# Patient Record
Sex: FEMALE | Race: WHITE | NOT HISPANIC OR LATINO | Employment: FULL TIME | URBAN - METROPOLITAN AREA
[De-identification: names, ages, dates, MRNs, and addresses within clinical notes are randomized per-mention and may not be internally consistent; named-entity substitution may affect disease eponyms.]

---

## 2017-09-28 ENCOUNTER — ALLSCRIPTS OFFICE VISIT (OUTPATIENT)
Dept: OTHER | Facility: OTHER | Age: 52
End: 2017-09-28

## 2017-09-28 DIAGNOSIS — M79.671 PAIN OF RIGHT FOOT: ICD-10-CM

## 2017-09-28 DIAGNOSIS — R91.1 SOLITARY PULMONARY NODULE: ICD-10-CM

## 2017-09-28 DIAGNOSIS — R60.0 LOCALIZED EDEMA: ICD-10-CM

## 2017-09-28 DIAGNOSIS — M54.50 LOW BACK PAIN: ICD-10-CM

## 2017-09-28 DIAGNOSIS — M79.604 PAIN OF RIGHT LEG: ICD-10-CM

## 2017-09-28 DIAGNOSIS — I10 ESSENTIAL (PRIMARY) HYPERTENSION: ICD-10-CM

## 2017-09-28 DIAGNOSIS — Z12.31 ENCOUNTER FOR SCREENING MAMMOGRAM FOR MALIGNANT NEOPLASM OF BREAST: ICD-10-CM

## 2017-09-28 DIAGNOSIS — E78.2 MIXED HYPERLIPIDEMIA: ICD-10-CM

## 2017-09-28 DIAGNOSIS — M79.672 PAIN OF LEFT FOOT: ICD-10-CM

## 2017-09-28 DIAGNOSIS — Z13.6 ENCOUNTER FOR SCREENING FOR CARDIOVASCULAR DISORDERS: ICD-10-CM

## 2017-09-28 DIAGNOSIS — M79.605 PAIN OF LEFT LEG: ICD-10-CM

## 2017-10-13 ENCOUNTER — ALLSCRIPTS OFFICE VISIT (OUTPATIENT)
Dept: OTHER | Facility: OTHER | Age: 52
End: 2017-10-13

## 2017-10-29 NOTE — CONSULTS
Assessment  1  GERD (gastroesophageal reflux disease) (530 81) (K21 9)   2  Encounter for colorectal cancer screening (V76 51) (Z12 11,Z12 12)    Plan  Encounter for colorectal cancer screening    · Suprep Bowel Prep Kit 17 5-3 13-1 6 GM/180ML Oral Solution; USE AS DIRECTED   Rx By: Kimber Maurer; Dispense: 0 Days ; #:1 X 177 ML Bottle (2 Bottles); Refill: 0;For: Encounter for colorectal cancer screening; ESTEFANIA = N; Verified Transmission to [facility] [place] ([address]; Last Updated By: System, RegenaStem; 10/13/2017 4:22:08 PM   · COLONOSCOPY (GI, SURG); Status:Hold For - Scheduling; Requested QKK:[id number];    Perform:[facility]; Due:13Oct2018; Ordered;For:Encounter for colorectal cancer screening; Ordered By:Garcia Spivey;  GERD (gastroesophageal reflux disease)    · EGD; Status:Hold For - Scheduling; Requested for:13Oct2017;    Perform:[facility]; Due:18Oct2017;Ordered;(gastroesophageal reflux disease); Ordered By:Garcia Spivey; Discussion/Summary  Discussion Summary:     Screening for colon cancer - patient is at average risk for colon cancer screening  Rule out colorectal lesions including polyps or malignancy  Gastroesophageal reflux disease, long-standing- rule out Leavitt's esophagus    Schedule for both EGD and colonoscopy  High-fiber diet  Patient was given instructions about the colonoscopy prep  Patient was explained about the risks and benefits of the procedure  Risks including but not limited to bleeding, infection, perforation were explained in detail  Also explained about less than 100% sensitivity with the exam and other alternatives  Continue Pepcid as needed  Patient was explained about the lifestyle and dietary modifications  Advised to avoid fatty foods, chocolates, caffeine, alcohol and any other triggering foods  Avoid eating for at least 3 hours before going to bed     Counseling Documentation With Imm: The patient was counseled regarding instructions for management,-- risk factor reductions,-- patient and family education,-- risks and benefits of treatment options,-- importance of compliance with treatment  Chief Complaint  Chief Complaint Free Text Note Form: for colon cancer      History of Present Illness  HPI: female with history of hypertension was referred for colonoscopy  Patient had colonoscopy about 30 years ago  Patient has regular bowel movements and denies any blood or mucus in the stool  Appetite is good and denies any recent weight loss  Denies any abdominal pain, nausea, or vomiting  She has also chronic acid reflux for which she was taking Pepcid on regular basis until recently  Denies any difficulty swallowing  History Reviewed: The history was obtained today from the patient and I agree with the documented history  Review of Systems  Complete-Female GI Adult:  Constitutional: No fever, no chills, feels well, no tiredness, no recent weight gain or weight loss  Eyes: No complaints of eye pain, no red eyes, no eyesight problems, no discharge, no dry eyes, no itching of eyes  ENT: no complaints of earache, no loss of hearing, no nose bleeds, no nasal discharge, no sore throat, no hoarseness  Cardiovascular: No complaints of slow heart rate, no fast heart rate, no chest pain, no palpitations, no leg claudication, no lower extremity edema  Respiratory: No complaints of shortness of breath, no wheezing, no cough, no SOB on exertion, no orthopnea, no PND  Gastrointestinal: as noted in HPI  Genitourinary: No complaints of dysuria, no incontinence, no pelvic pain, no dysmenorrhea, no vaginal discharge or bleeding  Musculoskeletal: arthralgias, but-- no joint swelling,-- no limb pain,-- no myalgias,-- no joint stiffness-- and-- no limb swelling  Integumentary: No complaints of skin rash or lesions, no itching, no skin wounds, no breast pain or lump    Neurological: No complaints of headache, no confusion, no convulsions, no numbness, no dizziness or fainting, no tingling, no limb weakness, no difficulty walking  Psychiatric: Not suicidal, no sleep disturbance, no anxiety or depression, no change in personality, no emotional problems  Endocrine: No complaints of proptosis, no hot flashes, no muscle weakness, no deepening of the voice, no feelings of weakness  Hematologic/Lymphatic: No complaints of swollen glands, no swollen glands in the neck, does not bleed easily, does not bruise easily  Active Problems  1  Adverse reaction to statin medication (E942 2) (T46 6X5A)   2  Bilateral leg and foot pain (729 5) (M79 604,M79 605,M79 671,M79 672)   3  Encounter for colorectal cancer screening (V76 51) (Z12 11,Z12 12)   4  Encounter for screening for cardiovascular disorders (V81 2) (Z13 6)   5  Hyperlipemia, mixed (272 2) (E78 2)   6  Hypertension (401 9) (I10)   7  Lower extremity edema (782 3) (R60 0)   8  Lumbago (724 2) (M54 5)   9  Nodule of apex of right lung (793 11) (R91 1)   10  Screening mammogram, encounter for (V76 12) (Z12 31)    Past Medical History  1  History of Pinched nerve (355 9) (G58 9)  Active Problems And Past Medical History Reviewed: The active problems and past medical history were reviewed and updated today  Surgical History  1  History of Arterial Catheterization   2  History of Biopsy Skin   3  History of Repair Tendon / Muscle Of Elbow  Surgical History Reviewed: The surgical history was reviewed and updated today  Family History  Mother    1  Family history of Dementia due to Alzheimer's disease   2  Family history of Edema   3  Family history of Osteoarthritis of knee  Father    4  Family history of alcoholism (V17 0) (Z81 1)   5  Family history of cerebrovascular accident (CVA) (V17 1) (Z82 3)   6  Family history of coronary artery disease (V17 3) (Z82 49)   7  Family history of essential hypertension (V17 49) (Z82 49)   8  Family history of hypothyroidism (V18 19) (Z83 49)   9   Family history of Status post coronary artery bypass graft  Sister    10  Family history of cerebrovascular accident (CVA) (V17 1) (Z82 3)  Brother    6  Family history of suicide (V17 0) (Z81 8)  Family History Reviewed: The family history was reviewed and updated today  Social History     · Former smoker (D85 96) (A99 431)  Social History Reviewed: The social history was reviewed and updated today  The social history was reviewed and is unchanged  Current Meds   1  Furosemide 40 MG Oral Tablet; ONE EVERY DAY  Requested for: 75BBF1416; Last Rx:[id number] Ordered   2  Ibuprofen TABS; TAKE TABLET  PRN; Therapy: (Recorded:13Oct2017) to Recorded   3  Losartan Potassium-HCTZ 50-12 5 MG Oral Tablet; TAKE 1 TABLET DAILY; Therapy: 47QIQ7244 to [phone])  Requested for: 22RYG8122; Last Rx:35Mow7159 Ordered   4  Omega-3-acid Ethyl Esters 1 GM Oral Capsule; TAKE 2 CAPSULES TWICE DAILY WITH MEALS  Requested for: 85YJZ8197; Last Rx:[id number] Ordered   5  Pepcid TABS; TAKE TABLET  PRN; Therapy: (Recorded:13Oct2017) to Recorded   6  Soma TABS; TAKE TABLET  PRN; Therapy: (Recorded:13Oct2017) to Recorded  Medication List Reviewed: The medication list was reviewed and updated today  Allergies  1  No Known Drug Allergies    Vitals  Vital Signs    Recorded: 45LQR0046 04:13PM   Temperature 98 5 F   Heart Rate 90   Respiration 16   Systolic 606   Diastolic 74   Height 5 ft 1 in   Weight 185 lb 6 oz   BMI Calculated 35 03   BSA Calculated 1 83   O2 Saturation 99       Physical Exam   Constitutional  General appearance: No acute distress, well appearing and well nourished  Eyes  Conjunctiva and lids: No swelling, erythema or discharge  Pupils and irises: Equal, round and reactive to light  Ears, Nose, Mouth, and Throat  External inspection of ears and nose: Normal    Oropharynx: Normal with no erythema, edema, exudate or lesions  Pulmonary  Respiratory effort: No increased work of breathing or signs of respiratory distress  Auscultation of lungs: Clear to auscultation  Cardiovascular  Palpation of heart: Normal PMI, no thrills  Auscultation of heart: Normal rate and rhythm, normal S1 and S2, without murmurs  Examination of extremities for edema and/or varicosities: Normal    Carotid pulses: Normal    Abdomen  Abdomen: Non-tender, no masses  Liver and spleen: No hepatomegaly or splenomegaly  Lymphatic  Palpation of lymph nodes in neck: No lymphadenopathy  Musculoskeletal  Gait and station: Normal    Digits and nails: Normal without clubbing or cyanosis  Inspection/palpation of joints, bones, and muscles: Normal    Skin  Skin and subcutaneous tissue: Normal without rashes or lesions  Psychiatric  Orientation to person, place, and time: Normal    Mood and affect: Normal          Future Appointments    Date/Time Provider Specialty Site   12/06/2017 09:30 AM THOMAS Pringle   Gastroenterology [facility]       Signatures   Electronically signed by : THOMAS Osman ; Oct 13 2017  5:29PM EST                       (Author)

## 2017-11-04 ENCOUNTER — GENERIC CONVERSION - ENCOUNTER (OUTPATIENT)
Dept: OTHER | Facility: OTHER | Age: 52
End: 2017-11-04

## 2017-11-07 ENCOUNTER — GENERIC CONVERSION - ENCOUNTER (OUTPATIENT)
Dept: OTHER | Facility: OTHER | Age: 52
End: 2017-11-07

## 2017-11-10 ENCOUNTER — GENERIC CONVERSION - ENCOUNTER (OUTPATIENT)
Dept: OTHER | Facility: OTHER | Age: 52
End: 2017-11-10

## 2017-11-28 ENCOUNTER — GENERIC CONVERSION - ENCOUNTER (OUTPATIENT)
Dept: OTHER | Facility: OTHER | Age: 52
End: 2017-11-28

## 2017-12-05 RX ORDER — FUROSEMIDE 40 MG/1
40 TABLET ORAL DAILY
COMMUNITY
End: 2018-04-12 | Stop reason: ALTCHOICE

## 2017-12-05 RX ORDER — MULTIVIT WITH MINERALS/LUTEIN
1000 TABLET ORAL DAILY
COMMUNITY
End: 2018-05-21

## 2017-12-05 RX ORDER — IBUPROFEN 200 MG
TABLET ORAL EVERY 6 HOURS PRN
COMMUNITY

## 2017-12-05 RX ORDER — CHLORAL HYDRATE 500 MG
1000 CAPSULE ORAL DAILY
COMMUNITY
End: 2019-01-14 | Stop reason: SDUPTHER

## 2017-12-05 RX ORDER — CARISOPRODOL 350 MG/1
350 TABLET ORAL EVERY 6 HOURS PRN
COMMUNITY
End: 2018-12-09 | Stop reason: ALTCHOICE

## 2017-12-05 RX ORDER — LOSARTAN POTASSIUM AND HYDROCHLOROTHIAZIDE 12.5; 5 MG/1; MG/1
1 TABLET ORAL EVERY MORNING
COMMUNITY
End: 2018-04-12 | Stop reason: ALTCHOICE

## 2017-12-05 NOTE — PRE-PROCEDURE INSTRUCTIONS
Pre-Surgery Instructions:   Medication Instructions    carisoprodol (SOMA) 350 mg tablet Patient was instructed by Physician and understands   Famotidine (PEPCID PO) Patient was instructed by Physician and understands   furosemide (LASIX) 40 mg tablet Patient was instructed by Physician and understands   ibuprofen (MOTRIN) 200 mg tablet Patient was instructed by Physician and understands   losartan-hydrochlorothiazide (HYZAAR) 50-12 5 mg per tablet Patient was instructed by Physician and understands   Omega-3 Fatty Acids (FISH OIL) 1,000 mg Patient was instructed by Physician and understands   Sulfamethoxazole-Trimethoprim (BACTRIM PO) Patient was instructed by Physician and understands   vitamin E, tocopherol, 1,000 units capsule Patient was instructed by Physician and understands

## 2017-12-06 ENCOUNTER — HOSPITAL ENCOUNTER (OUTPATIENT)
Facility: AMBULARY SURGERY CENTER | Age: 52
Setting detail: OUTPATIENT SURGERY
Discharge: HOME | End: 2017-12-06
Attending: INTERNAL MEDICINE | Admitting: INTERNAL MEDICINE
Payer: COMMERCIAL

## 2017-12-06 ENCOUNTER — ANESTHESIA EVENT (OUTPATIENT)
Dept: GASTROENTEROLOGY | Facility: AMBULARY SURGERY CENTER | Age: 52
End: 2017-12-06
Payer: COMMERCIAL

## 2017-12-06 ENCOUNTER — GENERIC CONVERSION - ENCOUNTER (OUTPATIENT)
Dept: OTHER | Facility: OTHER | Age: 52
End: 2017-12-06

## 2017-12-06 ENCOUNTER — GENERIC CONVERSION - ENCOUNTER (OUTPATIENT)
Dept: GASTROENTEROLOGY | Facility: CLINIC | Age: 52
End: 2017-12-06

## 2017-12-06 VITALS
RESPIRATION RATE: 18 BRPM | HEIGHT: 61 IN | SYSTOLIC BLOOD PRESSURE: 95 MMHG | OXYGEN SATURATION: 96 % | DIASTOLIC BLOOD PRESSURE: 62 MMHG | WEIGHT: 185 LBS | TEMPERATURE: 97.7 F | HEART RATE: 75 BPM | BODY MASS INDEX: 34.93 KG/M2

## 2017-12-06 DIAGNOSIS — Z12.11 ENCOUNTER FOR SCREENING FOR MALIGNANT NEOPLASM OF COLON: ICD-10-CM

## 2017-12-06 DIAGNOSIS — Z12.12 ENCOUNTER FOR SCREENING FOR MALIGNANT NEOPLASM OF RECTUM: ICD-10-CM

## 2017-12-06 DIAGNOSIS — K21.9 GASTRO-ESOPHAGEAL REFLUX DISEASE WITHOUT ESOPHAGITIS: ICD-10-CM

## 2017-12-06 PROCEDURE — 88305 TISSUE EXAM BY PATHOLOGIST: CPT | Performed by: INTERNAL MEDICINE

## 2017-12-06 RX ORDER — SODIUM CHLORIDE 9 MG/ML
100 INJECTION, SOLUTION INTRAVENOUS CONTINUOUS
Status: DISCONTINUED | OUTPATIENT
Start: 2017-12-06 | End: 2017-12-06 | Stop reason: HOSPADM

## 2017-12-06 RX ORDER — PROPOFOL 10 MG/ML
INJECTION, EMULSION INTRAVENOUS AS NEEDED
Status: DISCONTINUED | OUTPATIENT
Start: 2017-12-06 | End: 2017-12-06 | Stop reason: SURG

## 2017-12-06 RX ORDER — LIDOCAINE HYDROCHLORIDE 10 MG/ML
INJECTION, SOLUTION INFILTRATION; PERINEURAL AS NEEDED
Status: DISCONTINUED | OUTPATIENT
Start: 2017-12-06 | End: 2017-12-06 | Stop reason: SURG

## 2017-12-06 RX ADMIN — PROPOFOL 50 MG: 10 INJECTION, EMULSION INTRAVENOUS at 10:05

## 2017-12-06 RX ADMIN — PROPOFOL 50 MG: 10 INJECTION, EMULSION INTRAVENOUS at 10:00

## 2017-12-06 RX ADMIN — SODIUM CHLORIDE 100 ML/HR: 0.9 INJECTION, SOLUTION INTRAVENOUS at 09:41

## 2017-12-06 RX ADMIN — PROPOFOL 100 MG: 10 INJECTION, EMULSION INTRAVENOUS at 09:55

## 2017-12-06 RX ADMIN — PROPOFOL 50 MG: 10 INJECTION, EMULSION INTRAVENOUS at 10:10

## 2017-12-06 RX ADMIN — LIDOCAINE HYDROCHLORIDE 50 MG: 10 INJECTION, SOLUTION INFILTRATION; PERINEURAL at 09:55

## 2017-12-06 NOTE — H&P
History and Physical - [facility]  Melissa Pearson 45 y o  female MRN: [id number]        HPI: 45year-old female with history of hypertension was referred for colonoscopy  Patient had colonoscopy about 30 years ago  Patient has regular bowel movements and denies any blood or mucus in the stool  Appetite is good and denies any recent weight loss  Denies any abdominal pain, nausea, or vomiting  She has also chronic acid reflux for which she was taking Pepcid on regular basis until recently  Denies any difficulty swallowing      Historical Information   Past Medical History:   Diagnosis Date    Anesthesia complication     extreme dizziness upon awakening    Anxiety     Chronic pain disorder      rt side pinched nerve    Depression     Edema     GERD (gastroesophageal reflux disease)     Hyperlipidemia     Hypertension     Irregular heart beat     palpitations    Irritable bowel syndrome     Liver disease     fatty    PONV (postoperative nausea and vomiting)     Tuberculosis     1986     Past Surgical History:   Procedure Laterality Date    CARDIAC CATHETERIZATION      due to brothers advanced heart disease and abnormal stress test    ELBOW SURGERY Right     tendon repair, post op nausea and dizziness    WISDOM TOOTH EXTRACTION       Social History   History   Alcohol Use No     Comment: 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     History   Drug Use No     History   Smoking Status    Former Smoker    Packs/day: 1 00    Years: 9 00    Quit date: 1986   Smokeless Tobacco    Never Used     Family History   Problem Relation Age of Onset    Alzheimer's disease Mother     Thyroid disease Mother     Stroke Father     Hypertension Father     Hyperlipidemia Father     Stroke Sister     Heart failure Sister     Heart disease Sister     Drug abuse Brother        Meds/Allergies     Prescriptions Prior to Admission   Medication    carisoprodol (SOMA) 350 mg tablet    Famotidine (PEPCID PO)    furosemide (LASIX) 40 mg tablet    ibuprofen (MOTRIN) 200 mg tablet    losartan-hydrochlorothiazide (HYZAAR) 50-12 5 mg per tablet    Omega-3 Fatty Acids (FISH OIL) 1,000 mg    Sulfamethoxazole-Trimethoprim (BACTRIM PO)    vitamin E, tocopherol, 1,000 units capsule       No Known Allergies    Objective     Blood pressure 119/59, pulse 79, temperature 97 7 °F (36 5 °C), temperature source Tympanic, resp  rate 18, height 5' 1" (1 549 m), weight 83 9 kg (185 lb), SpO2 97 %      PHYSICAL EXAM:    Gen: NAD  CV: S1 & S2 normal, RRR  CHEST: Clear to auscultate  ABD: soft, NT/ND, good bowel sounds  EXT: no edema    ASSESSMENT:     GERD, screening for colon cancer    PLAN:    EGD and colonoscopy

## 2017-12-06 NOTE — ANESTHESIA PREPROCEDURE EVALUATION
Review of Systems/Medical History  Patient summary reviewed  Chart reviewed  History of anesthetic complications     Cardiovascular  Hyperlipidemia, Hypertension ,    Pulmonary       GI/Hepatic    GERD , Liver disease, ,             Endo/Other     GYN       Hematology   Musculoskeletal       Neurology   Psychology   Anxiety, Depression ,            Physical Exam    Airway    Mallampati score: II  TM Distance: >3 FB  Neck ROM: full     Dental       Cardiovascular  Rhythm: regular, Rate: normal,     Pulmonary  Breath sounds clear to auscultation,     Other Findings        Anesthesia Plan  ASA Score- 3       Anesthesia Type- IV sedation with anesthesia with ASA Monitors  Additional Monitors:   Airway Plan:           Induction- intravenous  Informed Consent- Anesthetic plan and risks discussed with patient  I personally reviewed this patient with the CRNA  Discussed and agreed on the Anesthesia Plan with the CRNA  Kenzie Whelan

## 2017-12-06 NOTE — OP NOTE
COLONOSCOPY    PROCEDURE: Colonoscopy/ Polypectomy (Cold Snare)  Polypectomny (Cold Biopsy)    INDICATIONS: Screening for Colon Cancer    POST-OP DIAGNOSIS: See the impression below    SEDATION: Monitored anesthesia care, check anesthesia records    PHYSICAL EXAM:    BP 97/51   Pulse 74   Temp 97 7 °F (36 5 °C) (Tympanic)   Resp 18   Ht 5' 1" (1 549 m)   Wt 83 9 kg (185 lb)   SpO2 94%   BMI 34 96 kg/m²   Body mass index is 34 96 kg/m²  General: NAD  Heart: S1 & S2 normal, RRR  Lungs: CTA, No rales or rhonchi  Abdomen: Soft, nontender, nondistended, good bowel sounds    CONSENT:  Informed consent was obtained for the procedure, including sedation after explaining the risks and benefits of the procedure  Risks including but not limited to bleeding, perforation, infection, aspiration were discussed in detail  Also explained about less than 100%$ sensitivity with the exam and other alternatives  PREPARATION:   EKG tracing, pulse oximetry, blood pressure were monitored throughout the procedure  Patient was identified by myself both verbally and by visual inspection of ID band  DESCRIPTION:   Patient was placed in the left lateral decubitus position and was sedated with the above medication  Digital rectal examination was performed  The colonoscope was introduced in to the anal canal and advanced up to cecum, which was identified by the appendiceal orifice and IC valve  A careful inspection was made as the colonoscope was withdrawn, including a retroflexed view of the rectum; findings and interventions are described below  Appropriate photodocumentation was obtained  The quality of the colonic preparation was adequate  FINDINGS:    1  Cecum and ileocecal valve-normal mucosa    2  Ascending colon-5 mm polyp was removed with cold snare    3    Rectosigmoid junction-diminutive polyp was removed with cold biopsy forceps         IMPRESSIONS:      As above    RECOMMENDATIONS:    Check pathology    COMPLICATIONS:  None; patient tolerated the procedure well      DISPOSITION: PACU           CONDITION: Stable

## 2017-12-06 NOTE — OP NOTE
ESOPHAGOGASTRODUODENOSCOPY    PROCEDURE: EGD    INDICATIONS: GERD    POST-OP DIAGNOSIS: See the impression below    SEDATION: Monitored anesthesia care, check anesthesia records    PHYSICAL EXAM:    Vitals:    12/06/17 0926   BP: 119/59   Pulse: 79   Resp: 18   Temp: 97 7 °F (36 5 °C)   SpO2: 97%    Body mass index is 34 96 kg/m²  General: NAD  Heart: S1 & S2 normal, RRR  Lungs: CTA, No rales or rhonchi  Abdomen: Soft, nontender, nondistended, good bowel sounds    CONSENT:  Informed consent was obtained for the procedure, including sedation after explaining the risks and benefits of the procedure  Risks including but not limited to bleeding, perforation, infection, aspiration were discussed in detail  Also explained about less than 100% sensitivity with the exam and other alternatives  PREPARATION:   EKG tracing, pulse oximetry, blood pressure were monitored throughout the procedure  Patient was identified by myself both verbally and by visual inspection of ID band  DESCRIPTION:   Patient was placed in the left lateral decubitus position and was sedated with the above medication  The gastroscope was introduced in to the oropharynx and the esophagus was intubated under direct visualization  Scope was passed down the esophagus up to 2nd part of the duodenum  A careful inspection was made as the gastroscope was withdrawn, including a retroflexed view of the stomach; findings and interventions are described below  FINDINGS:    #1  Esophagus and GEJ-normal mucosa  #2  Stomach- normal     #3  Duodenum- normal         IMPRESSIONS:      As above    RECOMMENDATIONS:     Continue Pepcid    COMPLICATIONS:  None; patient tolerated the procedure well            DISPOSITION: PACU           CONDITION: Stable

## 2017-12-09 ENCOUNTER — GENERIC CONVERSION - ENCOUNTER (OUTPATIENT)
Dept: OTHER | Facility: OTHER | Age: 52
End: 2017-12-09

## 2018-01-11 ENCOUNTER — ALLSCRIPTS OFFICE VISIT (OUTPATIENT)
Dept: OTHER | Facility: OTHER | Age: 53
End: 2018-01-11

## 2018-01-12 NOTE — RESULT NOTES
Verified Results  * MAMMO SCREENING BILATERAL W CAD [id number] 10:40AM Karl Velezdorothy     Test Name Result Flag Reference   ЕЛЕНА [facility] SCREENING BILATERAL W CAD 11/04/2017        Summary / No summary entered :      No summary entered   Documents attached :      Mara CHANDRA; Enc: [id number] - Image Encounter -      [facility]) (Additional Information      Document)    Plan   please send  and send reminder to tisha oleary for mammo in a year     Signatures   Electronically signed by : Joao Thompson DO; Nov 7 2017  3:58PM EST                       (Author)

## 2018-01-13 VITALS
TEMPERATURE: 98.5 F | DIASTOLIC BLOOD PRESSURE: 74 MMHG | HEIGHT: 61 IN | HEART RATE: 90 BPM | SYSTOLIC BLOOD PRESSURE: 114 MMHG | RESPIRATION RATE: 16 BRPM | WEIGHT: 185.38 LBS | OXYGEN SATURATION: 99 % | BODY MASS INDEX: 35 KG/M2

## 2018-01-13 NOTE — PROGRESS NOTES
Assessment   1  Acute pharyngitis (462) (J02 9)   2  Former smoker (V15 82) (W44 908)   3  Adult BMI 34 0-34 9 kg/sq m (V85 34) (Z68 34)    Plan   Acute pharyngitis    · Azithromycin 250 MG Oral Tablet; TAKE 2 TABLETS ON DAY 1 THEN TAKE 1    TABLET A DAY FOR 4 DAYS   · Fluticasone Propionate 50 MCG/ACT Nasal Suspension; USE 2 SPRAYS IN EACH    NOSTRIL ONCE DAILY   · Follow Up if Not Better Evaluation and Treatment  Follow-up  Status: Complete  Done:    41KFW0954   · Drink plenty of fluids ; Status:Complete;   Done: 49QRG5293   · Gargle with warm salt water for 5 minutes every 4 hours ; Status:Complete;   Done:    71ETP2290   · Irrigate your nose twice a day ; Status:Complete;   Done: 41KJI0230    Discussion/Summary      Take antibiotics until finished  Advised on supportive care  Follow up as needed for persistent or worsening symptoms  Possible side effects of new medications were reviewed with the patient/guardian today  The treatment plan was reviewed with the patient/guardian  The patient/guardian understands and agrees with the treatment plan      Chief Complaint   Sore throat swollen glands teeth hurt head congestion sinus drainage issues headaches for the past week rmklpn      History of Present Illness   HPI: C/o sore throat for over a week  It hurts when she pushes on the left side of her neck  Denies sinus congestion or runny nose  Her teeth hurt and her ears are bothering her  Also experiencing headaches and a mild cough  has been taking Ibuprofen OTC which helps  Review of Systems        Constitutional: no fever-- and-- no chills  ENT: sore throat, but-- as noted in HPI  Respiratory: cough, but-- no shortness of breath-- and-- no wheezing  Gastrointestinal: no abdominal pain,-- no nausea,-- no vomiting-- and-- no diarrhea  Neurological: headache  Active Problems   1  Adult BMI 34 0-34 9 kg/sq m (V85 34) (Z68 34)   2  Arthritis (716 90) (M19 90)   3   Asthma (976 90) (J45 909)   4  Bilateral leg and foot pain (729 5) (M79 604,M79 605,M79 671,M79 672)   5  Colon polyps (211 3) (K63 5)   6  Constipation (564 00) (K59 00)   7  Depression (311) (F32 9)   8  Diarrhea (787 91) (R19 7)   9  Encounter for colorectal cancer screening (V76 51) (Z12 11,Z12 12)   10  GERD (gastroesophageal reflux disease) (530 81) (K21 9)   11  Hyperlipemia, mixed (272 2) (E78 2)   12  Hypertension (401 9) (I10)   13  IBS (irritable bowel syndrome) (564 1) (K58 9)   14  Lower extremity edema (782 3) (R60 0)   15  Lumbago (724 2) (M54 5)   16  Nodule of apex of right lung (793 11) (R91 1)   17  Palpitations (785 1) (R00 2)   18  Screening mammogram, encounter for (V76 12) (Z12 31)   19  TB lung fibrosis, exam unkn (011 42) (A15 0)    Past Medical History   1  History of Abscess of back (682 2) (L02 212)   2  History of Adverse reaction to statin medication (E942 2) (T46 6X5A)   3  History of abdominal pain (V13 89) (Z87 898)   4  History of Pinched nerve (355 9) (G58 9)  Active Problems And Past Medical History Reviewed: The active problems and past medical history were reviewed and updated today  Family History   Mother    1  Family history of Dementia due to Alzheimer's disease   2  Family history of Edema   3  Denied: Family history of colon cancer   4  Denied: Family history of colonic polyps   5  Denied: Family history of liver disease   6  Family history of Osteoarthritis of knee  Father    7  Family history of alcoholism (V17 0) (Z81 1)   8  Family history of cerebrovascular accident (CVA) (V17 1) (Z82 3)   9  Denied: Family history of colon cancer   10  Denied: Family history of colonic polyps   11  Family history of coronary artery disease (V17 3) (Z82 49)   12  Family history of essential hypertension (V17 49) (Z82 49)   13  Family history of hypothyroidism (V18 19) (Z83 49)   15  Denied: Family history of liver disease   13   Family history of Status post coronary artery bypass graft  Sister    12  Family history of cerebrovascular accident (CVA) (V17 1) (Z82 3)  Brother    16  Family history of suicide (V17 0) (Z81 8)    Social History    · Former smoker (K03 13) (C88 293)   · History of Former smoker (V15 82) (Y98 249)   · No alcohol use  The social history was reviewed and is unchanged  Surgical History   1  History of Arterial Catheterization   2  History of Biopsy Skin   3  History of Complete Colonoscopy   4  History of Diagnostic Esophagogastroduodenoscopy   5  History of Repair Tendon / Muscle Of Elbow    Current Meds    1  Furosemide 40 MG Oral Tablet; ONE EVERY DAY  Requested for: 31RDL7115; Last     Rx:06Nyf4541 Ordered   2  Ibuprofen TABS; TAKE TABLET  PRN; Therapy: (Recorded:13Oct2017) to Recorded   3  Losartan Potassium-HCTZ 50-12 5 MG Oral Tablet; TAKE 1 TABLET DAILY; Therapy: 86FCB4553 to (Evaluate:27Jun2018)  Requested for: 13Nho9164; Last     Rx:[id number] Ordered   4  Omega-3-acid Ethyl Esters 1 GM Oral Capsule; TAKE 2 CAPSULES TWICE DAILY WITH     MEALS  Requested for: 37QMS8450; Last Rx:97Gji6598 Ordered   5  Pepcid TABS; TAKE TABLET  PRN; Therapy: (Recorded:13Oct2017) to Recorded   6  Soma TABS; TAKE TABLET  PRN; Therapy: (Recorded:13Oct2017) to Recorded     The medication list was reviewed and updated today  Allergies   1  No Known Drug Allergies    Vitals    Recorded: 37SMK1042 04:33PM   Temperature 97 2 F   Heart Rate 78   Respiration 18   Systolic 503   Diastolic 70   Height 5 ft 1 in   Weight 181 lb    BMI Calculated 34 2   BSA Calculated 1 81     Physical Exam        Constitutional      General appearance: No acute distress, well appearing and well nourished  Eyes      Conjunctiva and lids: No swelling, erythema or discharge  Ears, Nose, Mouth, and Throat      Otoscopic examination: Abnormal   The right tympanic membrane was retracted  The left tympanic membrane was retracted        Nasal mucosa, septum, and turbinates: Abnormal   The bilateral nasal mucosa was edematous  Oropharynx: Abnormal  -- post nasal drip  Pulmonary      Respiratory effort: No increased work of breathing or signs of respiratory distress  Auscultation of lungs: Clear to auscultation  Cardiovascular      Auscultation of heart: Normal rate and rhythm, normal S1 and S2, without murmurs  Examination of extremities for edema and/or varicosities: Normal        Lymphatic      Palpation of lymph nodes in neck: Abnormal   bilateral anterior cervical node enlargement  Skin      Skin and subcutaneous tissue: Normal without rashes or lesions  Psychiatric      Mood and affect: Normal           Attending Note   Collaborating Physician Note: Collaborating Note: I agree with the Advanced Practitioner note        Signatures    Electronically signed by : Keagan Saini; Jan 11 2018  4:50PM EST                       (Author)     Electronically signed by : Babak White DO; Jan 11 2018  4:53PM EST                       (Review)

## 2018-01-14 VITALS — HEART RATE: 82 BPM | SYSTOLIC BLOOD PRESSURE: 134 MMHG | DIASTOLIC BLOOD PRESSURE: 80 MMHG | RESPIRATION RATE: 14 BRPM

## 2018-01-16 NOTE — RESULT NOTES
Discussion/Summary   please call pt let her know mammo was acceptable will need to be repeated in a year,     Verified Results  * MAMMO SCREENING BILATERAL W CAD [id number] 12:00AM Karl Chung     Test Name Result Flag Reference   ЕЛЕНА [facility] SCREENING BILATERAL W CAD 11/04/2017        Summary / No summary entered :      No summary entered   Documents attached :      Mara CHANDRA; Enc: [id number] - Image Encounter -      [facility]) (Additional Information      Document)    Plan  Screening mammogram, encounter for    · * MAMMO SCREENING BILATERAL W CAD; Status:Complete;   Done: 83CPM4604  12:00AM

## 2018-01-22 VITALS
HEART RATE: 82 BPM | RESPIRATION RATE: 18 BRPM | DIASTOLIC BLOOD PRESSURE: 70 MMHG | HEIGHT: 61 IN | WEIGHT: 185 LBS | BODY MASS INDEX: 34.93 KG/M2 | SYSTOLIC BLOOD PRESSURE: 120 MMHG | TEMPERATURE: 97.6 F

## 2018-01-23 VITALS
BODY MASS INDEX: 34.17 KG/M2 | SYSTOLIC BLOOD PRESSURE: 120 MMHG | TEMPERATURE: 97.2 F | HEART RATE: 78 BPM | DIASTOLIC BLOOD PRESSURE: 70 MMHG | WEIGHT: 181 LBS | HEIGHT: 61 IN | RESPIRATION RATE: 18 BRPM

## 2018-01-23 NOTE — RESULT NOTES
Discussion/Summary    One of the polyps removed came back as precancerous serrated adenoma  Another polyp is benign hyperplastic  Repeat colonoscopy in 3 years    DISCUSSED WITH PATIENT, REMINDER SET  CS         Verified Results  (1) TISSUE EXAM [id number] 10:09AM Joe [place]     Test Name Result Flag Reference   LAB AP CASE REPORT (Report)     Surgical Pathology Report             Case: [id number]                   Authorizing Provider: Priya Vgiil MD     Collected:      12/06/2017 1009        Ordering Location:   [facility]  Received:      12/06/2017 [address]                                     Pathologist:      Jaky Sheffield MD                             Specimens:  A) - Polyp, Colorectal, Cold Snare Ascending Polyp                           B) - Polyp, Colorectal, Cold Bx Rectosigmoid Polyp   LAB AP FINAL DIAGNOSIS      A  Colon, ascending polyp, biopsy:  - Sessile serrated adenoma  B  Colon, rectosigmoid polyp, biopsy:  - Hyperplastic polyp  Electronically signed by Jaky Sheffield MD on 12/8/2017 at 2:27 PM   LAB AP SURGICAL ADDITIONAL INFORMATION (Report)     All controls performed with the immunohistochemical stains reported above   reacted appropriately  These tests were developed and their performance   characteristics determined by [facility] or   [facility]  They may not be cleared or approved by the [place]  Food and Drug Administration  The FDA has determined that such clearance   or approval is not necessary  These tests are used for clinical purposes  They should not be regarded as investigational or for research  This   laboratory has been approved by CLIA 88, designated as a high-complexity   laboratory and is qualified to perform these tests  LAB AP GROSS DESCRIPTION (Report)     A   The specimen is received in formalin, labeled with the patient's name   and hospital number, and is designated ascending polyp  The specimen   consists of one tan soft tissue fragment measuring 0 6 cm in greatest   dimension  Entirely submitted in one cassette  B  The specimen is received in formalin, labeled with the patient's name   and hospital number, and is designated Rectosigmoid polyp  The specimen   consists of one tan soft tissue fragment measuring 0 4 cm in greatest   dimension  Entirely submitted in one cassette  Note: The estimated total formalin fixation time based upon information   provided by the submitting clinician and the standard processing schedule   is less than 72 hours      Charity

## 2018-04-12 ENCOUNTER — OFFICE VISIT (OUTPATIENT)
Dept: FAMILY MEDICINE CLINIC | Facility: CLINIC | Age: 53
End: 2018-04-12
Payer: COMMERCIAL

## 2018-04-12 VITALS
SYSTOLIC BLOOD PRESSURE: 106 MMHG | BODY MASS INDEX: 34.36 KG/M2 | HEIGHT: 61 IN | TEMPERATURE: 98.2 F | HEART RATE: 82 BPM | WEIGHT: 182 LBS | DIASTOLIC BLOOD PRESSURE: 70 MMHG | RESPIRATION RATE: 18 BRPM

## 2018-04-12 DIAGNOSIS — E78.2 HYPERLIPEMIA, MIXED: ICD-10-CM

## 2018-04-12 DIAGNOSIS — R60.0 LOWER EXTREMITY EDEMA: ICD-10-CM

## 2018-04-12 DIAGNOSIS — L73.9 FOLLICULITIS: Primary | ICD-10-CM

## 2018-04-12 DIAGNOSIS — I10 ESSENTIAL HYPERTENSION: ICD-10-CM

## 2018-04-12 PROBLEM — F32.A DEPRESSION: Status: ACTIVE | Noted: 2017-10-13

## 2018-04-12 PROBLEM — K63.5 COLON POLYPS: Status: ACTIVE | Noted: 2017-12-11

## 2018-04-12 PROBLEM — T46.6X5A ADVERSE REACTION TO STATIN MEDICATION: Status: ACTIVE | Noted: 2017-09-28

## 2018-04-12 PROBLEM — M54.50 LOW BACK PAIN: Status: ACTIVE | Noted: 2017-09-28

## 2018-04-12 PROBLEM — J45.909 ASTHMA: Status: ACTIVE | Noted: 2017-10-13

## 2018-04-12 PROBLEM — R91.1 NODULE OF APEX OF RIGHT LUNG: Status: ACTIVE | Noted: 2017-09-28

## 2018-04-12 PROBLEM — R19.7 DIARRHEA: Status: ACTIVE | Noted: 2017-10-13

## 2018-04-12 PROBLEM — R00.2 PALPITATIONS: Status: ACTIVE | Noted: 2017-10-13

## 2018-04-12 PROBLEM — K58.9 IBS (IRRITABLE BOWEL SYNDROME): Status: ACTIVE | Noted: 2017-10-13

## 2018-04-12 PROBLEM — K21.9 GERD (GASTROESOPHAGEAL REFLUX DISEASE): Status: ACTIVE | Noted: 2017-10-13

## 2018-04-12 PROBLEM — M19.90 ARTHRITIS: Status: ACTIVE | Noted: 2017-10-13

## 2018-04-12 PROBLEM — A15.0: Status: ACTIVE | Noted: 2017-10-13

## 2018-04-12 PROBLEM — R60.9 EDEMA: Status: ACTIVE | Noted: 2018-04-12

## 2018-04-12 PROCEDURE — 99214 OFFICE O/P EST MOD 30 MIN: CPT | Performed by: NURSE PRACTITIONER

## 2018-04-12 RX ORDER — MAG HYDROX/ALUMINUM HYD/SIMETH 400-400-40
SUSPENSION, ORAL (FINAL DOSE FORM) ORAL DAILY
COMMUNITY

## 2018-04-12 RX ORDER — FLUTICASONE PROPIONATE 50 MCG
2 SPRAY, SUSPENSION (ML) NASAL DAILY
COMMUNITY
Start: 2018-01-11 | End: 2018-05-21

## 2018-04-12 RX ORDER — SULFAMETHOXAZOLE AND TRIMETHOPRIM 800; 160 MG/1; MG/1
1 TABLET ORAL EVERY 12 HOURS SCHEDULED
Qty: 14 TABLET | Refills: 0 | Status: SHIPPED | OUTPATIENT
Start: 2018-04-12 | End: 2018-04-19

## 2018-04-12 RX ORDER — DIMENHYDRINATE 50 MG
2 TABLET ORAL DAILY
COMMUNITY

## 2018-04-12 RX ORDER — FUROSEMIDE 40 MG/1
1 TABLET ORAL DAILY
COMMUNITY
End: 2018-07-02 | Stop reason: SDUPTHER

## 2018-04-12 RX ORDER — LOSARTAN POTASSIUM AND HYDROCHLOROTHIAZIDE 12.5; 5 MG/1; MG/1
1 TABLET ORAL DAILY
COMMUNITY
Start: 2017-09-28 | End: 2018-07-02 | Stop reason: SDUPTHER

## 2018-04-12 RX ORDER — FAMOTIDINE 40 MG/1
1 TABLET, FILM COATED ORAL DAILY PRN
COMMUNITY

## 2018-04-12 NOTE — PROGRESS NOTES
Assessment/Plan:  Take medication with food  It is important that you take the entire course of antibiotics prescribed  May also take a probiotic of your choice to maintain healthy GI june  Can take some probiotic and yogurt with the medication  If will gets worse or no improvement, will follow up with surgeon for drain  Supportive care discussed and advised  Follow up for no improvement and worsening of conditions  Patient advised and educated when to see immediate medical care  Diagnoses and all orders for this visit:    Folliculitis  -     sulfamethoxazole-trimethoprim (BACTRIM DS) 800-160 mg per tablet; Take 1 tablet by mouth every 12 (twelve) hours for 7 days    Hyperlipemia, mixed  Comments: Will repeat labs and patient stated that did not tolerated statin in past    Orders:  -     Lipid Panel with Direct LDL reflex; Future  -     Comprehensive metabolic panel; Future    Essential hypertension  Comments:  Stable and continue with same regimen  Orders:  -     Lipid Panel with Direct LDL reflex; Future  -     Comprehensive metabolic panel; Future    Lower extremity edema  Comments:  Stable with lasix and will check CMP    BMI 34 0-34 9,adult    Other orders  -     fluticasone (FLONASE) 50 mcg/act nasal spray; 2 sprays into each nostril daily  -     furosemide (LASIX) 40 mg tablet; Take 1 tablet by mouth daily  -     losartan-hydrochlorothiazide (HYZAAR) 50-12 5 mg per tablet; Take 1 tablet by mouth daily  -     famotidine (PEPCID) 40 MG tablet; Take 1 tablet by mouth daily as needed  -     Cholecalciferol (VITAMIN D3) 5000 units CAPS; Take by mouth daily  -     Calcium Carbonate-Vitamin D (CALCIUM 600+D HIGH POTENCY PO); Take by mouth daily  -     Vitamin Mixture (SOPHY-C PO); Take by mouth daily  -     Flaxseed, Linseed, (FLAX SEED OIL) 1000 MG CAPS; Take 4 capsules by mouth daily          Return if symptoms worsen or fail to improve      Subjective:      Patient ID: Alexsander Chin is a 52 y o  female  Chief Complaint   Patient presents with    Mass     under R eye brow tender to touch  rmklpn       HPI   Patient stated that plucked her eye brow week and half ago and about week ago somebody noticed redness on her right eyebrow and stated that she noticed pimple on right eyebrow area with discomfort since 4/9 and using heat and not getting better  Denies fever, chills and vision changes  Complaint with Hyzaar and lasix for lower leg edema  Denies any chest pain, SOB, worsening of edema and leg claudication  The following portions of the patient's history were reviewed and updated as appropriate: allergies, current medications, past family history, past medical history, past social history, past surgical history and problem list       Review of Systems   Constitutional: Negative  Eyes: Negative  Respiratory: Negative  Cardiovascular: Negative  Gastrointestinal: Negative  Genitourinary: Negative  Skin: Positive for color change  As noted in HPI   Neurological: Negative  Psychiatric/Behavioral: Negative            Objective:    History   Smoking Status    Former Smoker    Packs/day: 1 00    Years: 9 00    Quit date: 1986   Smokeless Tobacco    Never Used       Allergies: No Known Allergies    Vitals:  /70   Pulse 82   Temp 98 2 °F (36 8 °C)   Resp 18   Ht 5' 1" (1 549 m)   Wt 82 6 kg (182 lb)   BMI 34 39 kg/m²     Current Outpatient Prescriptions   Medication Sig Dispense Refill    Calcium Carbonate-Vitamin D (CALCIUM 600+D HIGH POTENCY PO) Take by mouth daily      carisoprodol (SOMA) 350 mg tablet Take 350 mg by mouth every 6 (six) hours as needed for muscle spasms      Cholecalciferol (VITAMIN D3) 5000 units CAPS Take by mouth daily      famotidine (PEPCID) 40 MG tablet Take 1 tablet by mouth daily as needed      Flaxseed, Linseed, (FLAX SEED OIL) 1000 MG CAPS Take 4 capsules by mouth daily      fluticasone (FLONASE) 50 mcg/act nasal spray 2 sprays into each nostril daily      furosemide (LASIX) 40 mg tablet Take 1 tablet by mouth daily      ibuprofen (MOTRIN) 200 mg tablet Take by mouth every 6 (six) hours as needed for mild pain      losartan-hydrochlorothiazide (HYZAAR) 50-12 5 mg per tablet Take 1 tablet by mouth daily      Omega-3 Fatty Acids (FISH OIL) 1,000 mg Take 1,000 mg by mouth daily      vitamin E, tocopherol, 1,000 units capsule Take 1,000 Units by mouth daily      Vitamin Mixture (SOPHY-C PO) Take by mouth daily      sulfamethoxazole-trimethoprim (BACTRIM DS) 800-160 mg per tablet Take 1 tablet by mouth every 12 (twelve) hours for 7 days 14 tablet 0    Sulfamethoxazole-Trimethoprim (BACTRIM PO) Take by mouth       No current facility-administered medications for this visit  Physical Exam   Constitutional: She is oriented to person, place, and time  She appears well-developed and well-nourished  Eyes: Conjunctivae and lids are normal  Pupils are equal, round, and reactive to light  Cardiovascular: Normal rate, regular rhythm, normal heart sounds and intact distal pulses  No murmur heard  Pulmonary/Chest: Effort normal and breath sounds normal  No respiratory distress  Musculoskeletal: Normal range of motion  She exhibits no edema, tenderness or deformity  Neurological: She is alert and oriented to person, place, and time  She has normal reflexes  Skin: Skin is warm and dry  Psychiatric: She has a normal mood and affect   Her speech is normal and behavior is normal  Judgment and thought content normal  Cognition and memory are normal          PAUL Lynne

## 2018-04-12 NOTE — PATIENT INSTRUCTIONS
Take medication with food  It is important that you take the entire course of antibiotics prescribed  May also take a probiotic of your choice to maintain healthy GI june  Can take some probiotic and yogurt with the medication  Supportive care discussed and advised  Follow up for no improvement and worsening of conditions  Patient advised and educated when to see immediate medical care  Sulfamethoxazole/Trimethoprim (By mouth)   Sulfamethoxazole (sul-fa-meth-OX-a-zole), Trimethoprim (trye-METH-oh-prim)  Treats or prevents infections  Brand Name(s): Bactrim, Bactrim DS, SMZ-TMP Pediatric, Sulfatrim, Sulfatrim Pediatric   There may be other brand names for this medicine  When This Medicine Should Not Be Used: This medicine is not right for everyone  Do not use it if you had an allergic reaction to trimethoprim, sulfamethoxazole, or any sulfa drug  Do not use this medicine if you are pregnant, if you have anemia caused by low levels of folic acid, or if you have a history of drug-induced thrombocytopenia  How to Use This Medicine:   Liquid, Tablet  · Your doctor will tell you how much medicine to use  Do not use more than directed  · Measure the oral liquid medicine with a marked measuring spoon, oral syringe, or medicine cup  · Drink extra fluids so you will urinate more often and help prevent kidney problems  · Take all of the medicine in your prescription to clear up your infection, even if you feel better after the first few doses  · Missed dose: Take a dose as soon as you remember  If it is almost time for your next dose, wait until then and take a regular dose  Do not take extra medicine to make up for a missed dose  · Store the medicine in a closed container at room temperature, away from heat, moisture, and direct light  Do not freeze the oral liquid    Drugs and Foods to Avoid:   Ask your doctor or pharmacist before using any other medicine, including over-the-counter medicines, vitamins, and herbal products  · Some medicines can affect how this medicine works  Tell your doctor if you also use the following:   ¨ amantadine, cyclosporine, digoxin, indomethacin, memantine, methotrexate, phenytoin, pyrimethamine, or warfarin  ¨ an ACE inhibitor, diabetes medicine (glipizide, glyburide, metformin, pioglitazone, repaglinide, rosiglitazone), a diuretic (water pill, such as hydrochlorothiazide), or a tricyclic antidepressant  Warnings While Using This Medicine:   · It is not safe to take this medicine during pregnancy  It could harm an unborn baby  Tell your doctor right away if you become pregnant  · Tell your doctor if you are breastfeeding, or if you have kidney disease, liver disease, diabetes, malabsorption or malnutrition, folate deficiency, porphyria, thyroid problems, or a history of alcoholism  Tell your doctor if you have asthma or severe allergies, especially if you are allergic to any medicines  It is important for your doctor to know if you have HIV or AIDS, because this medicine might work differently for you  · This medicine may cause a severe allergic reaction  · This medicine may lower the number of platelets in your body, which are necessary for proper blood clotting  This may cause you to bleed or get infections more easily  Talk with your doctor if you have concerns about this  · This medicine can cause diarrhea  Call your doctor if the diarrhea becomes severe, does not stop, or is bloody  Do not take any medicine to stop diarrhea until you have talked to your doctor  Diarrhea can occur 2 months or more after you stop taking this medicine  · Tell any doctor or dentist who treats you that you are using this medicine  This medicine may affect certain medical test results  · Your doctor will do lab tests at regular visits to check on the effects of this medicine  Keep all appointments  · Keep all medicine out of the reach of children  Never share your medicine with anyone    Possible Side Effects While Using This Medicine:   Call your doctor right away if you notice any of these side effects:  · Allergic reaction: Itching or hives, swelling in your face or hands, swelling or tingling in your mouth or throat, chest tightness, trouble breathing  · Blistering, peeling, or red skin rash  · Dark urine or pale stools, nausea, vomiting, loss of appetite, stomach pain, yellow skin or eyes  · Chest pain, cough, or trouble breathing  · Confusion, weakness  · Muscle twitching  · Severe diarrhea, stomach pain, cramps, bloating  · Skin rash, purple spots on your skin, or very pale or yellow skin  · Sore throat, fever, muscle pain  · Uneven heartbeat, numbness or tingling in your hands, feet, or lips  · Unusual bleeding, bruising, or weakness  If you notice these less serious side effects, talk with your doctor:   · Mild nausea, vomiting, or loss of appetite  If you notice other side effects that you think are caused by this medicine, tell your doctor  Call your doctor for medical advice about side effects  You may report side effects to FDA at 6-768-FDA-4848  © 2017 [address] [employer]  Information is for End User's use only and may not be sold, redistributed or otherwise used for commercial purposes  The above information is an  only  It is not intended as medical advice for individual conditions or treatments  Talk to your doctor, nurse or pharmacist before following any medical regimen to see if it is safe and effective for you  Folliculitis   WHAT YOU NEED TO KNOW:   Folliculitis is inflammation of your hair follicles  A hair follicle is a sac under your skin  Your hair grows out of the follicle  Folliculitis is caused by bacteria or fungus, most commonly a germ called Staph  Folliculitis can occur anywhere you have hair  DISCHARGE INSTRUCTIONS:   Medicines:   · Antibiotics: This medicine is given to fight or prevent an infection caused by bacteria   It may be given as an ointment that you apply to your skin or as a pill  Always take your antibiotics exactly as ordered by your healthcare provider  Never save antibiotics or take leftover antibiotics that were given to you for another illness  · Antifungal medicine: This medicine helps kill fungus that may be causing your folliculitis  It may be given as an cream that you apply to your skin or as a pill  · NSAIDs , such as ibuprofen, help decrease swelling, pain, and fever  This medicine is available with or without a doctor's order  NSAIDs can cause stomach bleeding or kidney problems in certain people  If you take blood thinner medicine, always ask if NSAIDs are safe for you  Always read the medicine label and follow directions  Do not give these medicines to children under 10months of age without direction from your child's healthcare provider  · Antihistamines: This medicine may be given to help decrease itching  · Take your medicine as directed  Contact your healthcare provider if you think your medicine is not helping or if you have side effects  Tell him of her if you are allergic to any medicine  Keep a list of the medicines, vitamins, and herbs you take  Include the amounts, and when and why you take them  Bring the list or the pill bottles to follow-up visits  Carry your medicine list with you in case of an emergency  Follow up with your healthcare provider or dermatologist as directed:  Write down your questions so you remember to ask them during your visits  Manage folliculitis:   · Use warm compresses:  Wet a washcloth with warm water and apply it to the infected skin area to help decrease pain and swelling  Warm compresses may also help drain pus and improve healing  · Clean the area:  Use antibacterial soap to wash the affected area  Change your washcloths and towels every day  · Avoid shaving the area: If possible, do not shave areas that have folliculitis   If you must shave, use an electric razor or new blade every time you shave  Prevent folliculitis:   · Do not share personal items:  Do not share towels, soap, or any personal items with other people  · Do not wear tight clothing:  Do not wear tight-fitting clothes that rub against and irritate your skin  · Treat skin injuries right away:  Treat injuries such as cuts and scrapes right away  Wash them with warm, soapy water, and cover the area to prevent infection  Contact your healthcare provider or dermatologist if:  · You have a fever  · You have foul-smelling pus coming from the bumps on your skin  · Your rash is spreading  · You have questions or concerns about your condition or care  Return to the emergency department if:  · You develop large areas of red, warm, tender skin around the folliculitis  · You develop boils  © 2017 [address] [employer] Information is for End User's use only and may not be sold, redistributed or otherwise used for commercial purposes  All illustrations and images included in CareNotes® are the copyrighted property of [employer]  or [employer]  The above information is an  only  It is not intended as medical advice for individual conditions or treatments  Talk to your doctor, nurse or pharmacist before following any medical regimen to see if it is safe and effective for you  Weight Management   WHAT YOU NEED TO KNOW:   Being overweight increases your risk of health conditions such as heart disease, high blood pressure, type 2 diabetes, and certain types of cancer  It can also increase your risk for osteoarthritis, sleep apnea, and other respiratory problems  Aim for a slow, steady weight loss  Even a small amount of weight loss can lower your risk of health problems  DISCHARGE INSTRUCTIONS:   How to lose weight safely:  A safe and healthy way to lose weight is to eat fewer calories and get regular exercise   You can lose up about 1 pound a week by decreasing the number of calories you eat by 500 calories each day  You can decrease calories by eating smaller portion sizes or by cutting out high-calorie foods  Read labels to find out how many calories are in the foods you eat  You can also burn calories with exercise such as walking, swimming, or biking  You will be more likely to keep weight off if you make these changes part of your lifestyle  Healthy meal plan for weight management:  A healthy meal plan includes a variety of foods, contains fewer calories, and helps you stay healthy  A healthy meal plan includes the following:  · Eat whole-grain foods more often  A healthy meal plan should contain fiber  Fiber is the part of grains, fruits, and vegetables that is not broken down by your body  Whole-grain foods are healthy and provide extra fiber in your diet  Some examples of whole-grain foods are whole-wheat breads and pastas, oatmeal, brown rice, and bulgur  · Eat a variety of vegetables every day  Include dark, leafy greens such as spinach, kale, ronak greens, and mustard greens  Eat yellow and orange vegetables such as carrots, sweet potatoes, and winter squash  · Eat a variety of fruits every day  Choose fresh or canned fruit (canned in its own juice or light syrup) instead of juice  Fruit juice has very little or no fiber  · Eat low-fat dairy foods  Drink fat-free (skim) milk or 1% milk  Eat fat-free yogurt and low-fat cottage cheese  Try low-fat cheeses such as mozzarella and other reduced-fat cheeses  · Choose meat and other protein foods that are low in fat  Choose beans or other legumes such as split peas or lentils  Choose fish, skinless poultry (chicken or turkey), or lean cuts of red meat (beef or pork)  Before you cook meat or poultry, cut off any visible fat  · Use less fat and oil  Try baking foods instead of frying them  Add less fat, such as margarine, sour cream, regular salad dressing, and mayonnaise to foods   Eat fewer high-fat foods  Some examples of high-fat foods include french fries, doughnuts, ice cream, and cakes  · Eat fewer sweets  Limit foods and drinks that are high in sugar  This includes candy, cookies, regular soda, and sweetened drinks  Ways to decrease calories:   · Eat smaller portions  ¨ Use a small plate with smaller servings  ¨ Do not eat second helpings  ¨ When you eat at a restaurant, ask for a box and place half of your meal in the box before you eat  ¨ Share an entrée with someone else  · Replace high-calorie snacks with healthy, low-calorie snacks  ¨ Choose fresh fruit, vegetables, fat-free rice cakes, or air-popped popcorn instead of potato chips, nuts, or chocolate  ¨ Choose water or calorie-free drinks instead of soda or sweetened drinks  · Eat regular meals  Skipping meals can lead to overeating later in the day  Eat a healthy snack in place of a meal if you do not have time to eat a regular meal      · Do not shop for groceries when you are hungry  You may be more likely to make unhealthy food choices  Take a grocery list of healthy foods and shop after you have eaten  Exercise:  Exercise at least 30 minutes per day on most days of the week  Some examples of exercise include walking, biking, dancing, and swimming  You can also fit in more physical activity by taking the stairs instead of the elevator or parking farther away from stores  Ask your healthcare provider about the best exercise plan for you  Other things to consider as you try to lose weight:   · Be aware of situations that may give you the urge to overeat, such as eating while watching television  Find ways to avoid these situations  For example, read a book, go for a walk, or do crafts  · Meet with a weight loss support group or friends who are also trying to lose weight  This may help you stay motivated to continue working on your weight loss goals    © 2017 [employer] Information is for End User's use only and may not be sold, redistributed or otherwise used for commercial purposes  All illustrations and images included in CareNotes® are the copyrighted property of [employer]  or [address]  The above information is an  only  It is not intended as medical advice for individual conditions or treatments  Talk to your doctor, nurse or pharmacist before following any medical regimen to see if it is safe and effective for you

## 2018-05-21 ENCOUNTER — OFFICE VISIT (OUTPATIENT)
Dept: OBGYN CLINIC | Facility: CLINIC | Age: 53
End: 2018-05-21
Payer: COMMERCIAL

## 2018-05-21 VITALS
DIASTOLIC BLOOD PRESSURE: 74 MMHG | BODY MASS INDEX: 34.81 KG/M2 | SYSTOLIC BLOOD PRESSURE: 110 MMHG | HEIGHT: 61 IN | WEIGHT: 184.4 LBS

## 2018-05-21 DIAGNOSIS — Z12.4 ENCOUNTER FOR SCREENING FOR MALIGNANT NEOPLASM OF CERVIX: Primary | ICD-10-CM

## 2018-05-21 DIAGNOSIS — Z01.419 WELL FEMALE EXAM WITH ROUTINE GYNECOLOGICAL EXAM: ICD-10-CM

## 2018-05-21 DIAGNOSIS — Z12.31 ENCOUNTER FOR SCREENING MAMMOGRAM FOR MALIGNANT NEOPLASM OF BREAST: ICD-10-CM

## 2018-05-21 DIAGNOSIS — R92.2 DENSE BREASTS: ICD-10-CM

## 2018-05-21 PROCEDURE — S0610 ANNUAL GYNECOLOGICAL EXAMINA: HCPCS | Performed by: OBSTETRICS & GYNECOLOGY

## 2018-05-21 PROCEDURE — 87624 HPV HI-RISK TYP POOLED RSLT: CPT | Performed by: OBSTETRICS & GYNECOLOGY

## 2018-05-21 PROCEDURE — G0145 SCR C/V CYTO,THINLAYER,RESCR: HCPCS | Performed by: OBSTETRICS & GYNECOLOGY

## 2018-05-21 NOTE — PROGRESS NOTES
ASSESSMENT & PLAN: Alexsander Chin is a 52 y o   with normal gynecologic exam     1   Routine well woman exam done today  2   Pap and HPV:Pap with HPV was done today  Current ASCCP Guidelines reviewed  3   Mammogram ordered  Recommend yearly 3D mammography due to dense breasts  4   Colonoscopy recommended  5  The patient is not sexually active  6  The following were reviewed in today's visit: breast self exam, mammography screening ordered, exercise and healthy diet  7  Patient to return to office in 12 months for annual exam      All questions have been answered to her satisfaction  CC:  Annual Gynecologic Examination    HPI: Alexsander Chin is a 52 y o  [id number] who presents for annual gynecologic examination  She has the following concerns:  None  Overdue for pap  Lisseth Du are uncomfortable  Health Maintenance:    She exercises 3 days per week  She wears her seatbelt routinely  She does perform semi regular monthly self breast exams  She feels safe at home  Patients does not follow a balanced diet  Last mammogram:   Last colonoscopy:        Past Medical History:   Diagnosis Date    Alcoholism ([facility] [address] )     AGE 27    Anesthesia complication     extreme dizziness upon awakening    Anxiety     Chronic pain disorder      rt side pinched nerve    Depression     Edema     GERD (gastroesophageal reflux disease)     Hyperlipidemia     Hypertension     Irregular heart beat     palpitations    Irritable bowel syndrome     Liver disease     fatty    PONV (postoperative nausea and vomiting)     Tuberculosis            Past Surgical History:   Procedure Laterality Date    CARDIAC CATHETERIZATION      due to brothers advanced heart disease and abnormal stress test    COLONOSCOPY N/A 2017    Procedure: COLONOSCOPY;  Surgeon: Marylene Alstrom, MD;  Location: [place] [address] GI LAB;   Service: Gastroenterology    ELBOW SURGERY Right     tendon repair, post op nausea and dizziness  ESOPHAGOGASTRODUODENOSCOPY N/A 2017    Procedure: ESOPHAGOGASTRODUODENOSCOPY (EGD); Surgeon: Omar Rosenthal MD;  Location: [facility] GI [facility]; Service: Gastroenterology    WISDOM TOOTH EXTRACTION         Past OB/Gyn History:   No LMP recorded  Patient is postmenopausal      Menstrual History:  OB History      Para Term  AB Living    2       2      SAB TAB Ectopic Multiple Live Births                        No LMP recorded  Patient is postmenopausal        Menstrual history: Patient is post menopausal    History of sexually transmitted infection No  Patient is not currently sexually active: heterosexual  Birth control: postmenopausal  Last Pap many years ago :  no abnormalities  Family History   Problem Relation Age of Onset    Alzheimer's disease Mother     Thyroid disease Mother    Priti Crocker Stroke Father     Hypertension Father     Hyperlipidemia Father     Stroke Sister     Heart failure Sister     Heart disease Sister     Drug abuse Brother        Social History:  Social History     Social History    Marital status: Unknown     Spouse name: N/A    Number of children: N/A    Years of education: N/A     Occupational History    Not on file  Social History Main Topics    Smoking status: Former Smoker     Packs/day: 1 00     Years: 9 00     Quit date:     Smokeless tobacco: Never Used    Alcohol use No      Comment: 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    Drug use: No    Sexual activity: Not Currently     Other Topics Concern    Not on file     Social History Narrative    No narrative on file     Presently lives with self  Patient is   Patient is currently employed      Allergies  No Known Allergies    Current Outpatient Prescriptions:     Calcium Carbonate-Vitamin D (CALCIUM 600+D HIGH POTENCY PO), Take by mouth daily, Disp: , Rfl:     carisoprodol (SOMA) 350 mg tablet, Take 350 mg by mouth every 6 (six) hours as needed for muscle spasms, Disp: , Rfl:     Cholecalciferol (VITAMIN D3) 5000 units CAPS, Take by mouth daily, Disp: , Rfl:     famotidine (PEPCID) 40 MG tablet, Take 1 tablet by mouth daily as needed, Disp: , Rfl:     Flaxseed, Linseed, (FLAX SEED OIL) 1000 MG CAPS, Take 4 capsules by mouth daily, Disp: , Rfl:     furosemide (LASIX) 40 mg tablet, Take 1 tablet by mouth daily, Disp: , Rfl:     ibuprofen (MOTRIN) 200 mg tablet, Take by mouth every 6 (six) hours as needed for mild pain, Disp: , Rfl:     losartan-hydrochlorothiazide (HYZAAR) 50-12 5 mg per tablet, Take 1 tablet by mouth daily, Disp: , Rfl:     Omega-3 Fatty Acids (FISH OIL) 1,000 mg, Take 1,000 mg by mouth daily, Disp: , Rfl:     Review of Systems:  A complete review of systems was performed and was negative, except as listed  Denies weight changes, excessive fatigue, breast lumps or changes, urinary incontinence, urinary frequency, constipation or bowel changes, blood in stool, severe headaches, vaginal bleeding, vaginal discharge, vaginal dryness  +palpitations, cough, dyspnea on exertion, cystic breasts, arthralgia, joint stiffness, keratosis, LE edema  Physical Exam:  /74   Ht 5' 0 5" (1 537 m)   Wt 83 6 kg (184 lb 6 4 oz)   Breastfeeding? No   BMI 35 42 kg/m²    GEN: The patient was alert and oriented x3, pleasant well-appearing female in no acute distress  HEENT:  Unremarkable, no anterior or posterior lymphadenopathy, no thyromegaly  CV:  RRR, no murmurs  RESP:  Clear to auscultation bilaterally  BREAST:  Symmetric breasts with no palpable breast masses or obvious breast lesions  She has no retractions or nipple discharge  She has no axillary abnormalities or palpable masses  Self breast exam is taught  ABD:  Soft, nontender, non-distended  EXT: nontender, no edema  PELVIC:  Normal appearing external female genitalia, normal vaginal epithelium, No discharge  Cervix present , no lesions  Bimanual: absent CMT,  normal uterus, non-tender  No palpable adnexal masses     Pap was collected

## 2018-05-24 LAB — HPV RRNA GENITAL QL NAA+PROBE: NORMAL

## 2018-05-25 LAB
LAB AP GYN PRIMARY INTERPRETATION: NORMAL
Lab: NORMAL

## 2018-07-02 DIAGNOSIS — R60.9 EDEMA, UNSPECIFIED TYPE: ICD-10-CM

## 2018-07-02 DIAGNOSIS — I10 ESSENTIAL HYPERTENSION: Primary | ICD-10-CM

## 2018-07-02 RX ORDER — LOSARTAN POTASSIUM AND HYDROCHLOROTHIAZIDE 12.5; 5 MG/1; MG/1
1 TABLET ORAL DAILY
Qty: 30 TABLET | Refills: 3 | Status: SHIPPED | OUTPATIENT
Start: 2018-07-02 | End: 2018-08-14

## 2018-07-02 RX ORDER — FUROSEMIDE 40 MG/1
40 TABLET ORAL DAILY
Qty: 30 TABLET | Refills: 3 | Status: SHIPPED | OUTPATIENT
Start: 2018-07-02 | End: 2018-08-14

## 2018-08-13 ENCOUNTER — TELEPHONE (OUTPATIENT)
Dept: FAMILY MEDICINE CLINIC | Facility: CLINIC | Age: 53
End: 2018-08-13

## 2018-08-13 NOTE — TELEPHONE ENCOUNTER
So what are the details  I generally do not suggest intermittant fasting so that may lower her bp in and of itself    On the flip side if she is losing weight with that and her bp is lower should should have an appt to adjust her meds

## 2018-08-14 ENCOUNTER — OFFICE VISIT (OUTPATIENT)
Dept: FAMILY MEDICINE CLINIC | Facility: CLINIC | Age: 53
End: 2018-08-14
Payer: COMMERCIAL

## 2018-08-14 VITALS
DIASTOLIC BLOOD PRESSURE: 72 MMHG | SYSTOLIC BLOOD PRESSURE: 110 MMHG | HEIGHT: 61 IN | BODY MASS INDEX: 33.79 KG/M2 | TEMPERATURE: 98 F | RESPIRATION RATE: 18 BRPM | WEIGHT: 179 LBS | HEART RATE: 64 BPM

## 2018-08-14 DIAGNOSIS — I10 ESSENTIAL HYPERTENSION: Primary | ICD-10-CM

## 2018-08-14 DIAGNOSIS — E66.09 CLASS 1 OBESITY DUE TO EXCESS CALORIES WITH SERIOUS COMORBIDITY AND BODY MASS INDEX (BMI) OF 34.0 TO 34.9 IN ADULT: ICD-10-CM

## 2018-08-14 DIAGNOSIS — Z11.59 NEED FOR HEPATITIS C SCREENING TEST: ICD-10-CM

## 2018-08-14 DIAGNOSIS — E78.2 HYPERLIPEMIA, MIXED: ICD-10-CM

## 2018-08-14 PROBLEM — M54.50 LOW BACK PAIN: Status: RESOLVED | Noted: 2017-09-28 | Resolved: 2018-08-14

## 2018-08-14 PROBLEM — E66.811 CLASS 1 OBESITY DUE TO EXCESS CALORIES WITH SERIOUS COMORBIDITY AND BODY MASS INDEX (BMI) OF 34.0 TO 34.9 IN ADULT: Status: ACTIVE | Noted: 2018-08-14

## 2018-08-14 PROBLEM — R00.2 PALPITATIONS: Status: RESOLVED | Noted: 2017-10-13 | Resolved: 2018-08-14

## 2018-08-14 PROBLEM — R19.7 DIARRHEA: Status: RESOLVED | Noted: 2017-10-13 | Resolved: 2018-08-14

## 2018-08-14 PROCEDURE — 3074F SYST BP LT 130 MM HG: CPT | Performed by: FAMILY MEDICINE

## 2018-08-14 PROCEDURE — 99213 OFFICE O/P EST LOW 20 MIN: CPT | Performed by: FAMILY MEDICINE

## 2018-08-14 PROCEDURE — 3078F DIAST BP <80 MM HG: CPT | Performed by: FAMILY MEDICINE

## 2018-08-14 NOTE — PROGRESS NOTES
Pt states she stopped taking her bp meds  States she is doing intermittant fasting  Lost weight  [facility]    Problem List Items Addressed This Visit     Hyperlipemia, mixed    Essential hypertension - Primary    Class 1 obesity due to excess calories with serious comorbidity and body mass index (BMI) of 34 0 to 34 9 in adult     Pt is doing intermittant fasting - to help with weight loss  Has been losing weight           Other Visit Diagnoses     Need for hepatitis C screening test        BMI 34 0-34 9,adult              There are no Patient Instructions on file for this visit  No Follow-up on file  Subjective:      Patient ID: Masoud Odonnell is a 52 y o  female  Chief Complaint   Patient presents with    Follow-up     blood pressure issues  rmklpn       Pt states she stopped taking her bp meds  States she is doing intermittant fasting  Lost weight  [place] and states she was egtting lightheaded  States she then stopped her losartan  PT kept taking her lasix then stopped that  Pt was keeping a log of her pt so for the last two days has been off her lasix as well    Pt states topday is the first day she feels pretty well             The following portions of the patient's history were reviewed and updated as appropriate: allergies, current medications, past family history, past medical history, past social history, past surgical history and problem list     Review of Systems   Constitutional: Negative  Negative for activity change, appetite change, chills, diaphoresis and fatigue  HENT: Negative  Negative for dental problem, ear pain, sinus pressure and sore throat  Eyes: Negative  Negative for photophobia, pain, discharge, redness, itching and visual disturbance  Respiratory: Negative for apnea and chest tightness  Cardiovascular: Negative  Negative for chest pain, palpitations and leg swelling  Gastrointestinal: Negative  Negative for abdominal distention, abdominal pain, constipation and diarrhea  Endocrine: Negative  Negative for cold intolerance and heat intolerance  Genitourinary: Negative  Negative for difficulty urinating and dyspareunia  Musculoskeletal: Negative  Negative for arthralgias and back pain  Skin: Negative  Allergic/Immunologic: Negative for environmental allergies  Neurological: Negative  Negative for dizziness  Psychiatric/Behavioral: Negative  Negative for agitation  Current Outpatient Prescriptions   Medication Sig Dispense Refill    Calcium Carbonate-Vitamin D (CALCIUM 600+D HIGH POTENCY PO) Take by mouth daily      carisoprodol (SOMA) 350 mg tablet Take 350 mg by mouth every 6 (six) hours as needed for muscle spasms      Cholecalciferol (VITAMIN D3) 5000 units CAPS Take by mouth daily      famotidine (PEPCID) 40 MG tablet Take 1 tablet by mouth daily as needed      Flaxseed, Linseed, (FLAX SEED OIL) 1000 MG CAPS Take 4 capsules by mouth daily      ibuprofen (MOTRIN) 200 mg tablet Take by mouth every 6 (six) hours as needed for mild pain      Omega-3 Fatty Acids (FISH OIL) 1,000 mg Take 1,000 mg by mouth daily       No current facility-administered medications for this visit  Objective:    /72   Pulse 64   Temp 98 °F (36 7 °C)   Resp 18   Ht 5' 0 5" (1 537 m)   Wt 81 2 kg (179 lb)   BMI 34 38 kg/m²        Physical Exam   Constitutional: She appears well-developed and well-nourished  No distress  HENT:   Head: Normocephalic and atraumatic  Right Ear: External ear normal    Left Ear: External ear normal    Nose: Nose normal    Mouth/Throat: Oropharynx is clear and moist  No oropharyngeal exudate  Eyes: EOM are normal  Pupils are equal, round, and reactive to light  Right eye exhibits no discharge  Left eye exhibits no discharge  No scleral icterus  Neck: No thyromegaly present     Cardiovascular: Normal rate and normal heart sounds  No murmur heard  Pulmonary/Chest: Effort normal and breath sounds normal  No respiratory distress  She has no wheezes  Abdominal: Soft  Bowel sounds are normal  She exhibits no distension and no mass  There is no tenderness  There is no rebound and no guarding  Musculoskeletal: Normal range of motion  Neurological: She is alert  She displays normal reflexes  Coordination normal    Skin: Skin is warm and dry  No rash noted  She is not diaphoretic  No erythema  Psychiatric: She has a normal mood and affect  Her behavior is normal    Nursing note and vitals reviewed               Jaya Lucero, DO

## 2018-08-14 NOTE — TELEPHONE ENCOUNTER
Pt states has trouble losing weight her  at work suggested intermittent fasting she's been doing this for about six weeks fast for 16 hours per day  Recently went hiking and felt dizzy check bp and stated was lower than usual didn't give me a number  Since she has gone out a bought a automated bp cuff to check her bp 3-4 times per day and has been staying low  Stopped her losartan and lasix on her own, encouraged appt for today to recheck this provider   Appt made with Dr LAURENT gave her 30 mins at 215pm  sandra

## 2018-09-26 ENCOUNTER — APPOINTMENT (OUTPATIENT)
Dept: RADIOLOGY | Facility: CLINIC | Age: 53
End: 2018-09-26
Payer: COMMERCIAL

## 2018-09-26 ENCOUNTER — OFFICE VISIT (OUTPATIENT)
Dept: OBGYN CLINIC | Facility: CLINIC | Age: 53
End: 2018-09-26
Payer: COMMERCIAL

## 2018-09-26 VITALS
SYSTOLIC BLOOD PRESSURE: 115 MMHG | HEIGHT: 61 IN | BODY MASS INDEX: 33.99 KG/M2 | DIASTOLIC BLOOD PRESSURE: 77 MMHG | HEART RATE: 77 BPM | WEIGHT: 180 LBS

## 2018-09-26 DIAGNOSIS — M70.62 TROCHANTERIC BURSITIS OF LEFT HIP: ICD-10-CM

## 2018-09-26 DIAGNOSIS — M25.552 PAIN IN LEFT HIP: ICD-10-CM

## 2018-09-26 DIAGNOSIS — M54.10 RADICULAR PAIN OF LEFT LOWER EXTREMITY: Primary | ICD-10-CM

## 2018-09-26 DIAGNOSIS — M16.12 PRIMARY OSTEOARTHRITIS OF ONE HIP, LEFT: ICD-10-CM

## 2018-09-26 PROCEDURE — 99204 OFFICE O/P NEW MOD 45 MIN: CPT | Performed by: ORTHOPAEDIC SURGERY

## 2018-09-26 PROCEDURE — 73502 X-RAY EXAM HIP UNI 2-3 VIEWS: CPT

## 2018-09-26 NOTE — PROGRESS NOTES
Assessment/Plan:  1  Radicular pain of left lower extremity  MRI lumbar spine wo contrast   2  Trochanteric bursitis of left hip  XR hip/pelv 2-3 vws left if performed   3  Primary osteoarthritis of one hip, left         Scribe Attestation    I,:   Abby Dominguez MA am acting as a scribe while in the presence of the attending physician :        I,:   Sung Hess,  personally performed the services described in this documentation    as scribed in my presence :              Nicolle Woodson presents to the office today for evaluation of left sided radicular pain that has been ongoing for approximately 3 years  She has tried and failed conservative treatment options in the form of physical therapy, daily anti-inflammatories, and a left trochanteric bursa injections all of which did not provide her wit any pain relief  She has been treated for suspected hip pain by other orthopedic surgeons in the past and has not had significant relief  She does have mild left hip pathology but I do not feel as though this is casing her radicular pain, her left leg paraesthesias or her left leg pain that will radiate below her left  knee  I would like to order a lumbar spine MRI to evaluate for lumbar spine etiology  She will follow up with me after the MRI is complete to discuss to results and further delineate her plan of care  Subjective:   Shahida Stone is a 52 y o  female who presents to the office for evaluation of left thigh pain  She states this has been ongoing for approximately 3 years  She notes groin pain with occasional lateral left hip pain that will radiate down to her left leg  She states this is a ripping burning pain that can reach a 10 out of 10 on the pain scale  She states her left thigh pain is worse with activities and she notes increased pain and difficulty when getting in and out of bed and in and out of a car   She has tried formal physical therapy in the past which she states did not provide her with any relief in her pain complaints  She was previously treating with an Orthopedic in [place] who provided her with a left hip trochanteric bursa CSI which she states did not provide her with any pain relief  She also noted she had a left hip MRI arthrogram done which did not show any evidence of a labral tear  She takes Advil OTC as needed for her pain complaints which she states does provide her with mild relief in her symptoms  She does note paraesthesias into the left foot  Review of Systems   Constitutional: Negative for chills and fever  HENT: Negative for drooling and sneezing  Eyes: Negative for redness  Respiratory: Negative for cough and wheezing  Gastrointestinal: Negative for nausea and vomiting  Musculoskeletal: Negative for arthralgias, joint swelling and myalgias  Neurological: Negative for weakness and numbness  Psychiatric/Behavioral: Negative for behavioral problems  The patient is not nervous/anxious  Past Medical History:   Diagnosis Date    Alcoholism ([facility] [address] )     AGE 27    Anesthesia complication     extreme dizziness upon awakening    Anxiety     Chronic pain disorder      rt side pinched nerve    Depression     Edema     GERD (gastroesophageal reflux disease)     Hyperlipidemia     Hypertension     Irregular heart beat     palpitations    Irritable bowel syndrome     Liver disease     fatty    PONV (postoperative nausea and vomiting)     Tuberculosis     1986       Past Surgical History:   Procedure Laterality Date    CARDIAC CATHETERIZATION      due to brothers advanced heart disease and abnormal stress test    COLONOSCOPY N/A 12/6/2017    Procedure: COLONOSCOPY;  Surgeon: Perry Linda MD;  [facility]; Service: Gastroenterology    ELBOW SURGERY Right     tendon repair, post op nausea and dizziness    ESOPHAGOGASTRODUODENOSCOPY N/A 12/6/2017    Procedure: ESOPHAGOGASTRODUODENOSCOPY (EGD);   Surgeon: Perry Linda MD; Location: [place] [address] GI LAB; Service: Gastroenterology    WISDOM TOOTH EXTRACTION         Family History   Problem Relation Age of Onset    Alzheimer's disease Mother     Thyroid disease Mother     Stroke Father     Hypertension Father     Hyperlipidemia Father     Stroke Sister     Heart failure Sister     Heart disease Sister     Drug abuse Brother        Social History     Occupational History    Not on file       Social History Main Topics    Smoking status: Former Smoker     Packs/day: 1 00     Years: 9 00     Quit date: 1986    Smokeless tobacco: Never Used    Alcohol use No      Comment: 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    Drug use: No    Sexual activity: Not Currently         Current Outpatient Prescriptions:     Calcium Carbonate-Vitamin D (CALCIUM 600+D HIGH POTENCY PO), Take by mouth daily, Disp: , Rfl:     carisoprodol (SOMA) 350 mg tablet, Take 350 mg by mouth every 6 (six) hours as needed for muscle spasms, Disp: , Rfl:     Cholecalciferol (VITAMIN D3) 5000 units CAPS, Take by mouth daily, Disp: , Rfl:     famotidine (PEPCID) 40 MG tablet, Take 1 tablet by mouth daily as needed, Disp: , Rfl:     Flaxseed, Linseed, (FLAX SEED OIL) 1000 MG CAPS, Take 4 capsules by mouth daily, Disp: , Rfl:     ibuprofen (MOTRIN) 200 mg tablet, Take by mouth every 6 (six) hours as needed for mild pain, Disp: , Rfl:     Omega-3 Fatty Acids (FISH OIL) 1,000 mg, Take 1,000 mg by mouth daily, Disp: , Rfl:     No Known Allergies    Objective:  Vitals:    09/26/18 0819   BP: 115/77   Pulse: 77       Ortho Exam     Left Lower Extremity     TTP left LS junction and SI joint  TTP greater trochanteric bursa and IT band  Back flexion and extension does not cause pain or reproduce any symptoms  No internal or external snapping of hip  Negative ally  Negative fadir   Negative neruro straight leg raise  120 degrees left hip flexion   45 degrees left hip abduction  30 degrees left hip adduction  50 degrees left hip external rotation  20 degrees left hip internal rotation  normal reflexes    Right Lower Extremity     TTP SI joint         Physical Exam   Constitutional: She is oriented to person, place, and time  She appears well-developed and well-nourished  HENT:   Head: Atraumatic  Eyes: Conjunctivae are normal    Neck: Normal range of motion  Cardiovascular: Normal rate  Pulmonary/Chest: Effort normal    Musculoskeletal:   As noted in HPI   Neurological: She is alert and oriented to person, place, and time  Skin: Skin is warm and dry  Psychiatric: She has a normal mood and affect  Her behavior is normal  Judgment and thought content normal    Nursing note and vitals reviewed  I have personally reviewed pertinent films in PACS and my interpretation is as follows:X-ray left hip performed on 9/26/18 shows minimal degenerative change around the left hip with greater than 50% of the joint space still remaining small peripheral osteophyte formation superiorly on the acetabulum  Jennifer Eastern D O    Division of Adult Reconstruction  Department of [facility]

## 2018-12-09 ENCOUNTER — OFFICE VISIT (OUTPATIENT)
Dept: URGENT CARE | Facility: CLINIC | Age: 53
End: 2018-12-09
Payer: COMMERCIAL

## 2018-12-09 VITALS
SYSTOLIC BLOOD PRESSURE: 144 MMHG | DIASTOLIC BLOOD PRESSURE: 88 MMHG | HEART RATE: 107 BPM | TEMPERATURE: 99.2 F | OXYGEN SATURATION: 97 % | HEIGHT: 61 IN | RESPIRATION RATE: 18 BRPM | BODY MASS INDEX: 35.27 KG/M2 | WEIGHT: 186.8 LBS

## 2018-12-09 DIAGNOSIS — J32.9 RHINOSINUSITIS: Primary | ICD-10-CM

## 2018-12-09 DIAGNOSIS — J31.0 RHINOSINUSITIS: Primary | ICD-10-CM

## 2018-12-09 PROCEDURE — 99213 OFFICE O/P EST LOW 20 MIN: CPT | Performed by: PHYSICIAN ASSISTANT

## 2018-12-09 RX ORDER — GABAPENTIN 100 MG/1
100 CAPSULE ORAL DAILY
COMMUNITY
End: 2020-06-30 | Stop reason: SDUPTHER

## 2018-12-09 RX ORDER — FLUTICASONE PROPIONATE 50 MCG
2 SPRAY, SUSPENSION (ML) NASAL DAILY
Qty: 16 G | Refills: 0 | Status: SHIPPED | OUTPATIENT
Start: 2018-12-09 | End: 2022-04-13 | Stop reason: ALTCHOICE

## 2018-12-09 RX ORDER — GABAPENTIN 100 MG/1
200 CAPSULE ORAL
COMMUNITY
End: 2020-06-10

## 2018-12-09 NOTE — PROGRESS NOTES
[address] [employer] [facility]        NAME: Sebastian Pal is a 52 y o  female  : 1965    MRN: [id number]  DATE: 2018  TIME: 11:00 AM    Assessment and Plan   Rhinosinusitis [J32 9]  1  Rhinosinusitis  fluticasone (FLONASE) 50 mcg/act nasal spray     Patient Instructions   Begin Flonase using as directed  Continue Mucinex taking this medication with plenty of water  Use a cool mist humidifier at bedtime, turning on hours prior to bed with your bedroom door shut for maximum relief  Saltwater gargles, warm tea with honey, and throat lozenges may help to clear postnasal drainage  Follow up with your family doctor in 3-5 days  Proceed to the ER if symptoms worsen  Diagnosis, etiology, expected course of illness, and treatment plan were reviewed in length  All questions answered  Precautions given  Chief Complaint     Chief Complaint   Patient presents with    URI     Pt reports of worsening URI s/s started approx 4 days ago with fever   Fever - 9 weeks to 74 years     History of Present Illness   53 y/o female presenting with c/o sore throat x 3 days  Sx associated with fever (tmax 102), fatigue, nasal congestion, runny nose with clear drainage, b/l maxillary sinus pressure (demostrated), and dry cough  She notes a single episode of post tussive vomiting of mucus but otherwise denies any GI complaints  She notes sore throat has resolved but otherwise she feels she is worsening  She has tried treating with Mucinex with some relief  No sick contacts  No recent travel  Had the flu and pneumonia vaccines  Nonsmoker  Review of Systems   Review of Systems   Constitutional: Negative for chills and diaphoresis  HENT: Negative for ear pain  Respiratory: Negative for cough, shortness of breath and wheezing  Cardiovascular: Negative for chest pain and palpitations  Gastrointestinal: Negative for abdominal pain, diarrhea, nausea and vomiting     Musculoskeletal: Negative for arthralgias and myalgias  Skin: Negative for rash  Neurological: Negative for dizziness and light-headedness       Current Medications       Current Outpatient Prescriptions:     Calcium Carbonate-Vitamin D (CALCIUM 600+D HIGH POTENCY PO), Take by mouth daily, Disp: , Rfl:     Cholecalciferol (VITAMIN D3) 5000 units CAPS, Take by mouth daily, Disp: , Rfl:     famotidine (PEPCID) 40 MG tablet, Take 1 tablet by mouth daily as needed, Disp: , Rfl:     Flaxseed, Linseed, (FLAX SEED OIL) 1000 MG CAPS, Take 4 capsules by mouth daily, Disp: , Rfl:     gabapentin (NEURONTIN) 100 mg capsule, Take 100 mg by mouth daily, Disp: , Rfl:     gabapentin (NEURONTIN) 100 mg capsule, Take 200 mg by mouth daily at bedtime, Disp: , Rfl:     ibuprofen (MOTRIN) 200 mg tablet, Take by mouth every 6 (six) hours as needed for mild pain, Disp: , Rfl:     Omega-3 Fatty Acids (FISH OIL) 1,000 mg, Take 1,000 mg by mouth daily, Disp: , Rfl:     fluticasone (FLONASE) 50 mcg/act nasal spray, 2 sprays into each nostril daily, Disp: 16 g, Rfl: 0    Current Allergies     Allergies as of 12/09/2018    (No Known Allergies)            The following portions of the patient's history were reviewed and updated as appropriate: allergies, current medications, past family history, past medical history, past social history, past surgical history and problem list      Past Medical History:   Diagnosis Date    Alcoholism ([facility] [address] )     AGE 27    Anesthesia complication     extreme dizziness upon awakening    Anxiety     Chronic pain disorder      rt side pinched nerve    Depression     Edema     GERD (gastroesophageal reflux disease)     Hyperlipidemia     Hypertension     Irregular heart beat     palpitations    Irritable bowel syndrome     Liver disease     fatty    Neck pain     PONV (postoperative nausea and vomiting)     Tuberculosis     1986       Past Surgical History:   Procedure Laterality Date    CARDIAC CATHETERIZATION      due to brothers advanced heart disease and abnormal stress test    COLONOSCOPY N/A 12/6/2017    Procedure: COLONOSCOPY;  Surgeon: Ceasar Hoang MD;  Location: [place] [address] GI LAB; Service: Gastroenterology    ELBOW SURGERY Right     tendon repair, post op nausea and dizziness    ESOPHAGOGASTRODUODENOSCOPY N/A 12/6/2017    Procedure: ESOPHAGOGASTRODUODENOSCOPY (EGD); Surgeon: Ceasar Hoang MD;  Location: [facility]; Service: Gastroenterology    WISDOM TOOTH EXTRACTION         Family History   Problem Relation Age of Onset    Alzheimer's disease Mother     Thyroid disease Mother    [place] Stroke Father     Hypertension Father     Hyperlipidemia Father     Stroke Sister     Heart failure Sister     Heart disease Sister     Drug abuse Brother      Medications have been verified  Objective   /88   Pulse (!) 107   Temp 99 2 °F (37 3 °C) (Tympanic)   Resp 18   Ht 5' 1" (1 549 m)   Wt 84 7 kg (186 lb 12 8 oz)   LMP  (Exact Date)   SpO2 97%   BMI 35 30 kg/m²     Physical Exam     Physical Exam   Constitutional: She is oriented to person, place, and time  She appears well-developed and well-nourished  She is cooperative  She appears ill  No distress  HENT:   Head: Normocephalic and atraumatic  Right Ear: Hearing, external ear and ear canal normal  Tympanic membrane is injected  A middle ear effusion is present  Left Ear: Hearing, external ear and ear canal normal  Tympanic membrane is injected  A middle ear effusion is present  Nose: Mucosal edema and rhinorrhea present  Mouth/Throat: Uvula is midline, oropharynx is clear and moist and mucous membranes are normal  Mucous membranes are not pale, not dry and not cyanotic  No oral lesions  No uvula swelling  No oropharyngeal exudate, posterior oropharyngeal edema, posterior oropharyngeal erythema or tonsillar abscesses  Eyes: Conjunctivae are normal  Right eye exhibits no discharge  Left eye exhibits no discharge  No scleral icterus     Neck: Phonation normal  Neck supple  No neck rigidity  No edema and no erythema present  Cardiovascular: Normal rate, regular rhythm and normal heart sounds  Exam reveals no gallop and no friction rub  Pulmonary/Chest: Effort normal and breath sounds normal  No stridor  No respiratory distress  She has no decreased breath sounds  She has no wheezes  She has no rhonchi  She has no rales  Abdominal: Soft  Bowel sounds are normal  She exhibits no distension  There is no tenderness  Lymphadenopathy:     She has cervical adenopathy (b/l superior anterior)  Neurological: She is alert and oriented to person, place, and time  She has normal reflexes  No cranial nerve deficit  She exhibits normal muscle tone  Coordination normal    Skin: Skin is warm and dry  No rash noted  She is not diaphoretic  Psychiatric: She has a normal mood and affect  Her behavior is normal    Nursing note and vitals reviewed

## 2018-12-09 NOTE — PATIENT INSTRUCTIONS
Begin Flonase using as directed  Continue Mucinex taking this medication with plenty of water  Use a cool mist humidifier at bedtime, turning on hours prior to bed with your bedroom door shut for maximum relief  Saltwater gargles, warm tea with honey, and throat lozenges may help to clear postnasal drainage  Follow up with your family doctor in 3-5 days  Proceed to the ER if symptoms worsen

## 2018-12-09 NOTE — LETTER
December 9, 2018     Patient: Mora Eye   YOB: 1965   Date of Visit: 12/9/2018       To Whom It May Concern: It is my medical opinion that Mora Eye may return to work on 12/11/2018  If you have any questions or concerns, please don't hesitate to call           Sincerely,    Elsa Gold PA-C

## 2018-12-13 ENCOUNTER — OFFICE VISIT (OUTPATIENT)
Dept: FAMILY MEDICINE CLINIC | Facility: CLINIC | Age: 53
End: 2018-12-13
Payer: COMMERCIAL

## 2018-12-13 VITALS
HEIGHT: 61 IN | WEIGHT: 185 LBS | HEART RATE: 80 BPM | SYSTOLIC BLOOD PRESSURE: 130 MMHG | TEMPERATURE: 98.2 F | DIASTOLIC BLOOD PRESSURE: 90 MMHG | BODY MASS INDEX: 34.93 KG/M2 | RESPIRATION RATE: 16 BRPM

## 2018-12-13 DIAGNOSIS — H00.022 HORDEOLUM INTERNUM OF RIGHT LOWER EYELID: ICD-10-CM

## 2018-12-13 DIAGNOSIS — H65.02 ACUTE SEROUS OTITIS MEDIA OF LEFT EAR, RECURRENCE NOT SPECIFIED: Primary | ICD-10-CM

## 2018-12-13 PROCEDURE — 99213 OFFICE O/P EST LOW 20 MIN: CPT | Performed by: FAMILY MEDICINE

## 2018-12-13 PROCEDURE — 3008F BODY MASS INDEX DOCD: CPT | Performed by: FAMILY MEDICINE

## 2018-12-13 RX ORDER — TOBRAMYCIN AND DEXAMETHASONE 3; 1 MG/ML; MG/ML
1 SUSPENSION/ DROPS OPHTHALMIC
Qty: 5 ML | Refills: 0 | Status: SHIPPED | OUTPATIENT
Start: 2018-12-13 | End: 2019-10-03

## 2018-12-13 RX ORDER — AMOXICILLIN AND CLAVULANATE POTASSIUM 875; 125 MG/1; MG/1
1 TABLET, FILM COATED ORAL EVERY 12 HOURS SCHEDULED
Qty: 20 TABLET | Refills: 0 | Status: SHIPPED | OUTPATIENT
Start: 2018-12-13 | End: 2018-12-23

## 2018-12-13 NOTE — PROGRESS NOTES
Assessment/Plan:    Problem List Items Addressed This Visit     None      Visit Diagnoses     Acute serous otitis media of left ear, recurrence not specified    -  Primary    Relevant Medications    amoxicillin-clavulanate (AUGMENTIN) 875-125 mg per tablet    Hordeolum internum of right lower eyelid        Relevant Medications    tobramycin-dexamethasone (TOBRADEX) ophthalmic suspension          There are no Patient Instructions on file for this visit  No Follow-up on file  Subjective:      Patient ID: Moose Smith is a 52 y o  female  Chief Complaint   Patient presents with   Frankey Height     left ear prcma    Ear Fullness    Eye Problem     right eye        Pt states she starrted getting sick last Thursday she had a fever she went to the urgent care, was told abx would not help as she is viral was advised to take mucinex and was given a script for flonase  Pt states she is not feeling any better  Cant hear out of her left ear except for echos  Pt states she has a pustule in her rt eye            The following portions of the patient's history were reviewed and updated as appropriate: allergies, current medications, past family history, past medical history, past social history, past surgical history and problem list     Review of Systems   Constitutional: Negative  Negative for activity change, appetite change, chills, diaphoresis and fatigue  HENT: Positive for congestion and ear pain  Negative for dental problem, sinus pressure and sore throat  Eyes: Positive for redness  Negative for photophobia, pain, discharge, itching and visual disturbance  Respiratory: Negative for apnea and chest tightness  Cardiovascular: Negative  Negative for chest pain, palpitations and leg swelling  Gastrointestinal: Negative  Negative for abdominal distention, abdominal pain, constipation and diarrhea  Endocrine: Negative  Negative for cold intolerance and heat intolerance  Genitourinary: Negative  Negative for difficulty urinating and dyspareunia  Musculoskeletal: Negative  Negative for arthralgias and back pain  Skin: Negative  Allergic/Immunologic: Negative for environmental allergies  Neurological: Negative  Negative for dizziness  Psychiatric/Behavioral: Negative  Negative for agitation  Current Outpatient Prescriptions   Medication Sig Dispense Refill    Calcium Carbonate-Vitamin D (CALCIUM 600+D HIGH POTENCY PO) Take by mouth daily      Cholecalciferol (VITAMIN D3) 5000 units CAPS Take by mouth daily      famotidine (PEPCID) 40 MG tablet Take 1 tablet by mouth daily as needed      Flaxseed, Linseed, (FLAX SEED OIL) 1000 MG CAPS Take 4 capsules by mouth daily      fluticasone (FLONASE) 50 mcg/act nasal spray 2 sprays into each nostril daily 16 g 0    gabapentin (NEURONTIN) 100 mg capsule Take 100 mg by mouth daily      gabapentin (NEURONTIN) 100 mg capsule Take 200 mg by mouth daily at bedtime      ibuprofen (MOTRIN) 200 mg tablet Take by mouth every 6 (six) hours as needed for mild pain      Omega-3 Fatty Acids (FISH OIL) 1,000 mg Take 1,000 mg by mouth daily      amoxicillin-clavulanate (AUGMENTIN) 875-125 mg per tablet Take 1 tablet by mouth every 12 (twelve) hours for 10 days 20 tablet 0    tobramycin-dexamethasone (TOBRADEX) ophthalmic suspension Administer 1 drop to the right eye every 4 (four) hours while awake 5 mL 0     No current facility-administered medications for this visit  Objective:    /90   Pulse 80   Temp 98 2 °F (36 8 °C)   Resp 16   Ht 5' 1" (1 549 m)   Wt 83 9 kg (185 lb)   BMI 34 96 kg/m²        Physical Exam   Constitutional: She appears well-developed and well-nourished  No distress  HENT:   Head: Normocephalic and atraumatic  Right Ear: External ear normal  Tympanic membrane is erythematous and bulging  Left Ear: External ear normal  Tympanic membrane is bulging  Nose: Mucosal edema present     Mouth/Throat: Posterior oropharyngeal erythema present  No oropharyngeal exudate  Eyes: Pupils are equal, round, and reactive to light  EOM are normal  Right eye exhibits no discharge  Left eye exhibits no discharge  No scleral icterus  Pimple with head in rt lower lid   Neck: No thyromegaly present  Cardiovascular: Normal rate and normal heart sounds  No murmur heard  Pulmonary/Chest: Effort normal and breath sounds normal  No respiratory distress  She has no wheezes  Abdominal: Soft  Bowel sounds are normal  She exhibits no distension and no mass  There is no tenderness  There is no rebound and no guarding  Musculoskeletal: Normal range of motion  Neurological: She is alert  She displays normal reflexes  Coordination normal    Skin: Skin is warm and dry  No rash noted  She is not diaphoretic  No erythema  Psychiatric: She has a normal mood and affect  Her behavior is normal    Nursing note and vitals reviewed               Noel Strauss DO

## 2019-01-14 DIAGNOSIS — E78.2 MIXED HYPERLIPIDEMIA: Primary | ICD-10-CM

## 2019-01-14 RX ORDER — CHLORAL HYDRATE 500 MG
1000 CAPSULE ORAL DAILY
Qty: 90 EACH | Refills: 1 | Status: SHIPPED | OUTPATIENT
Start: 2019-01-14 | End: 2019-12-25

## 2019-08-26 ENCOUNTER — OFFICE VISIT (OUTPATIENT)
Dept: FAMILY MEDICINE CLINIC | Facility: CLINIC | Age: 54
End: 2019-08-26
Payer: COMMERCIAL

## 2019-08-26 VITALS
TEMPERATURE: 98.5 F | BODY MASS INDEX: 37.11 KG/M2 | WEIGHT: 189 LBS | HEART RATE: 64 BPM | RESPIRATION RATE: 20 BRPM | SYSTOLIC BLOOD PRESSURE: 150 MMHG | HEIGHT: 60 IN | DIASTOLIC BLOOD PRESSURE: 84 MMHG

## 2019-08-26 DIAGNOSIS — Z12.39 SCREENING FOR BREAST CANCER: ICD-10-CM

## 2019-08-26 DIAGNOSIS — S46.812A STRAIN OF LEFT TRAPEZIUS MUSCLE, INITIAL ENCOUNTER: ICD-10-CM

## 2019-08-26 DIAGNOSIS — M54.2 CERVICALGIA: Primary | ICD-10-CM

## 2019-08-26 PROCEDURE — 99213 OFFICE O/P EST LOW 20 MIN: CPT | Performed by: NURSE PRACTITIONER

## 2019-08-26 PROCEDURE — 3008F BODY MASS INDEX DOCD: CPT | Performed by: NURSE PRACTITIONER

## 2019-08-26 RX ORDER — METHYLPREDNISOLONE 4 MG/1
TABLET ORAL
Qty: 21 TABLET | Refills: 0 | Status: SHIPPED | OUTPATIENT
Start: 2019-08-26 | End: 2019-10-03

## 2019-08-26 RX ORDER — CYCLOBENZAPRINE HCL 10 MG
10 TABLET ORAL
Qty: 10 TABLET | Refills: 0 | Status: SHIPPED | OUTPATIENT
Start: 2019-08-26 | End: 2019-10-03

## 2019-08-26 NOTE — PROGRESS NOTES
Assessment/Plan:         Diagnoses and all orders for this visit:    Cervicalgia  -     XR spine cervical complete 4 or 5 vw non injury; Future  -     methylPREDNISolone 4 MG tablet therapy pack; Use as directed on package  -     cyclobenzaprine (FLEXERIL) 10 mg tablet; Take 1 tablet (10 mg total) by mouth daily at bedtime for 20 days    Strain of left trapezius muscle, initial encounter  -     XR spine cervical complete 4 or 5 vw non injury; Future  -     methylPREDNISolone 4 MG tablet therapy pack; Use as directed on package  -     cyclobenzaprine (FLEXERIL) 10 mg tablet; Take 1 tablet (10 mg total) by mouth daily at bedtime for 20 days    Screening for breast cancer  -     Mammo screening bilateral w cad; Future    Other orders  -     Cancel: Mammo screening bilateral w cad; Future          BMI Counseling: Body mass index is 36 91 kg/m²  Discussed the patient's BMI with her  The BMI is above average  BMI counseling and education was provided to the patient  Nutrition recommendations include reducing portion sizes, decreasing overall calorie intake, 3-5 servings of fruits/vegetables daily, reducing fast food intake, consuming healthier snacks, decreasing soda and/or juice intake, moderation in carbohydrate intake, increasing intake of lean protein, reducing intake of saturated fat and trans fat and reducing intake of cholesterol  Patient Instructions: Take prednisone with food in morning and do not take any NSAID's while taking prednisone  Do not drive and operate any machinery after taking muscle relaxant  Supportive care discussed and advised  Advised to RTO for any worsening and no improvement  Follow up for no improvement and worsening of conditions  Patient advised and educated when to see immediate medical care  Return if symptoms worsen or fail to improve  No future appointments  Subjective:      Patient ID: Shahida Stone is a 62 y o  female      Chief Complaint   Patient presents with    Neck Pain     Started 3-4 weeks ago with neck pain with no known trauma [id number]       HPI  Patient stated that having neck pain from about a month and radiating to left side of shoulder  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  Denies any numbness and tingling in arms, headache and dizziness  Tried OTC ibuprofen PRN but not much relief  Denies any injury and falls  Vitals:  /84   Pulse 64   Temp 98 5 °F (36 9 °C)   Resp 20   Ht 5' (1 524 m)   Wt 85 7 kg (189 lb)   BMI 36 91 kg/m²     The following portions of the patient's history were reviewed and updated as appropriate: allergies, current medications, past family history, past medical history, past social history, past surgical history and problem list       Review of Systems   Constitutional: Negative  HENT: Negative  Respiratory: Negative  Cardiovascular: Negative  Gastrointestinal: Negative  Musculoskeletal: Positive for neck pain  Negative for neck stiffness  Skin: Negative  Neurological: Negative  Psychiatric/Behavioral: Negative            Objective:    Social History     Tobacco Use   Smoking Status Former Smoker    Packs/day:     Years:     Pack years: 9     Last attempt to quit: [facility] Years since quittin 6   Smokeless Tobacco Never Used       Allergies: No Known Allergies      Current Outpatient Medications   Medication Sig Dispense Refill    Calcium Carbonate-Vitamin D (CALCIUM 600+D HIGH POTENCY PO) Take by mouth daily      Cholecalciferol (VITAMIN D3) 5000 units CAPS Take by mouth daily      famotidine (PEPCID) 40 MG tablet Take 1 tablet by mouth daily as needed      Flaxseed, Linseed, (FLAX SEED OIL) 1000 MG CAPS Take 2 capsules by mouth daily       fluticasone (FLONASE) 50 mcg/act nasal spray 2 sprays into each nostril daily 16 g 0    gabapentin (NEURONTIN) 100 mg capsule Take 100 mg by mouth daily      gabapentin (NEURONTIN) 100 mg capsule Take 200 mg by mouth daily at bedtime      ibuprofen (MOTRIN) 200 mg tablet Take by mouth every 6 (six) hours as needed for mild pain      cyclobenzaprine (FLEXERIL) 10 mg tablet Take 1 tablet (10 mg total) by mouth daily at bedtime for 20 days 10 tablet 0    methylPREDNISolone 4 MG tablet therapy pack Use as directed on package 21 tablet 0    Omega-3 Fatty Acids (FISH OIL) 1,000 mg Take 1 capsule (1,000 mg total) by mouth daily (Patient not taking: Reported on 8/26/2019) 90 each 1    tobramycin-dexamethasone (TOBRADEX) ophthalmic suspension Administer 1 drop to the right eye every 4 (four) hours while awake (Patient not taking: Reported on 8/26/2019) 5 mL 0     No current facility-administered medications for this visit  Physical Exam   Constitutional: She is oriented to person, place, and time  She appears well-developed and well-nourished  Cardiovascular: Normal rate and regular rhythm  Pulmonary/Chest: Effort normal and breath sounds normal    Musculoskeletal: Normal range of motion  She exhibits tenderness (tender on palpation on cervical spine area and left trapeizus muscle area without any edema  FROM of neck noted)  Neurological: She is alert and oriented to person, place, and time  Psychiatric: She has a normal mood and affect                     PAUL Chen

## 2019-08-26 NOTE — PATIENT INSTRUCTIONS
Take prednisone with food in morning and do not take any NSAID's while taking prednisone  Do not drive and operate any machinery after taking muscle relaxant  Supportive care discussed and advised  Advised to RTO for any worsening and no improvement  Follow up for no improvement and worsening of conditions  Patient advised and educated when to see immediate medical care

## 2019-08-29 ENCOUNTER — TELEPHONE (OUTPATIENT)
Dept: FAMILY MEDICINE CLINIC | Facility: CLINIC | Age: 54
End: 2019-08-29

## 2019-08-29 DIAGNOSIS — E78.2 HYPERLIPEMIA, MIXED: ICD-10-CM

## 2019-08-29 DIAGNOSIS — Z11.59 NEED FOR HEPATITIS C SCREENING TEST: ICD-10-CM

## 2019-08-29 DIAGNOSIS — I10 ESSENTIAL HYPERTENSION: Primary | ICD-10-CM

## 2019-08-29 NOTE — TELEPHONE ENCOUNTER
Usha Yoder would like routine blood work  She is aware that Dr Sherren Hey is not in until next week and that's fine  She's in no rush  Please call patient when bloodwork order ready      Thanks

## 2019-09-03 NOTE — TELEPHONE ENCOUNTER
She is due for a BP lab follow up  Does not look like I have seen her for a chronic visit in about a year    I will order labs she will need and appt

## 2019-09-17 ENCOUNTER — TELEPHONE (OUTPATIENT)
Dept: NEUROLOGY | Facility: CLINIC | Age: 54
End: 2019-09-17

## 2019-09-17 ENCOUNTER — TELEPHONE (OUTPATIENT)
Dept: FAMILY MEDICINE CLINIC | Facility: CLINIC | Age: 54
End: 2019-09-17

## 2019-09-17 DIAGNOSIS — M48.02 SPINAL STENOSIS IN CERVICAL REGION: Primary | ICD-10-CM

## 2019-09-17 NOTE — TELEPHONE ENCOUNTER
Spoke with pt, she states she has Spinal stenosis in her neck, herniated/bulging disks which are pinching a nerve and causing neuropathy in her feet   Guy Velez, [address]

## 2019-09-17 NOTE — TELEPHONE ENCOUNTER
Patient called back and stated that she made and appointment with Dr Tracy Heard but not until Dec 31st   She knows Dr Raphael Cruz will no write a script for gabapentin until then so she is going to go to her current Neuro and they will do it, she will then go to Dr Tracy Heard in December because Dr Tracy Heard is closer

## 2019-09-17 NOTE — TELEPHONE ENCOUNTER
Sure I will need sumanth attach that referral to a diagnosis in the computer    For what diagnosis would she like to see the specialist

## 2019-09-17 NOTE — TELEPHONE ENCOUNTER
[facility]ERGENCY [facility]    Patient would like to be referred to someone in [facility]  Please advise

## 2019-09-28 ENCOUNTER — TRANSCRIBE ORDERS (OUTPATIENT)
Dept: URGENT CARE | Facility: CLINIC | Age: 54
End: 2019-09-28

## 2019-09-28 DIAGNOSIS — Z02.1 PHYSICAL EXAM, PRE-EMPLOYMENT: Primary | ICD-10-CM

## 2019-09-28 PROCEDURE — 86480 TB TEST CELL IMMUN MEASURE: CPT | Performed by: NURSE PRACTITIONER

## 2019-10-03 ENCOUNTER — APPOINTMENT (OUTPATIENT)
Dept: RADIOLOGY | Facility: CLINIC | Age: 54
End: 2019-10-03
Payer: COMMERCIAL

## 2019-10-03 ENCOUNTER — TRANSCRIBE ORDERS (OUTPATIENT)
Dept: URGENT CARE | Facility: CLINIC | Age: 54
End: 2019-10-03

## 2019-10-03 ENCOUNTER — HOSPITAL ENCOUNTER (OUTPATIENT)
Dept: RADIOLOGY | Facility: HOSPITAL | Age: 54
Discharge: HOME | End: 2019-10-03

## 2019-10-03 ENCOUNTER — TELEPHONE (OUTPATIENT)
Dept: FAMILY MEDICINE CLINIC | Facility: CLINIC | Age: 54
End: 2019-10-03

## 2019-10-03 ENCOUNTER — OFFICE VISIT (OUTPATIENT)
Dept: FAMILY MEDICINE CLINIC | Facility: CLINIC | Age: 54
End: 2019-10-03
Payer: COMMERCIAL

## 2019-10-03 VITALS
OXYGEN SATURATION: 98 % | TEMPERATURE: 98.6 F | WEIGHT: 187.4 LBS | BODY MASS INDEX: 36.79 KG/M2 | HEIGHT: 60 IN | SYSTOLIC BLOOD PRESSURE: 132 MMHG | HEART RATE: 78 BPM | RESPIRATION RATE: 16 BRPM | DIASTOLIC BLOOD PRESSURE: 80 MMHG

## 2019-10-03 DIAGNOSIS — Z87.898 FORMER CONSUMPTION OF ALCOHOL: Primary | ICD-10-CM

## 2019-10-03 DIAGNOSIS — I10 ESSENTIAL HYPERTENSION: ICD-10-CM

## 2019-10-03 DIAGNOSIS — E78.2 HYPERLIPEMIA, MIXED: ICD-10-CM

## 2019-10-03 DIAGNOSIS — R91.1 NODULE OF APEX OF RIGHT LUNG: ICD-10-CM

## 2019-10-03 DIAGNOSIS — R76.12 POSITIVE QUANTIFERON-TB GOLD TEST: Primary | ICD-10-CM

## 2019-10-03 DIAGNOSIS — T46.6X5A ADVERSE REACTION TO STATIN MEDICATION: ICD-10-CM

## 2019-10-03 DIAGNOSIS — A15.0: ICD-10-CM

## 2019-10-03 DIAGNOSIS — R76.11 POSITIVE TB TEST: ICD-10-CM

## 2019-10-03 DIAGNOSIS — R76.12 POSITIVE QUANTIFERON-TB GOLD TEST: ICD-10-CM

## 2019-10-03 PROBLEM — R60.9 EDEMA: Status: RESOLVED | Noted: 2018-04-12 | Resolved: 2019-10-03

## 2019-10-03 PROBLEM — R60.0 LOWER EXTREMITY EDEMA: Status: RESOLVED | Noted: 2017-09-28 | Resolved: 2019-10-03

## 2019-10-03 PROBLEM — F32.A DEPRESSION: Status: RESOLVED | Noted: 2017-10-13 | Resolved: 2019-10-03

## 2019-10-03 PROCEDURE — 3079F DIAST BP 80-89 MM HG: CPT | Performed by: FAMILY MEDICINE

## 2019-10-03 PROCEDURE — 99213 OFFICE O/P EST LOW 20 MIN: CPT | Performed by: FAMILY MEDICINE

## 2019-10-03 PROCEDURE — 71046 X-RAY EXAM CHEST 2 VIEWS: CPT

## 2019-10-03 PROCEDURE — 3075F SYST BP GE 130 - 139MM HG: CPT | Performed by: FAMILY MEDICINE

## 2019-10-03 RX ORDER — MELOXICAM 7.5 MG/1
7.5 TABLET ORAL DAILY
COMMUNITY
End: 2019-11-06 | Stop reason: SDUPTHER

## 2019-10-03 NOTE — TELEPHONE ENCOUNTER
Pt is due for a follow up appt for chronic conditios  She should sched one and we can fill out form then    Their are already labs in the cahrt for the pt to get prior to her appt

## 2019-10-03 NOTE — TELEPHONE ENCOUNTER
PATIENT dropped off form to be filled out that is due to her employer ([employer] by Monday)  She had the CPE done at Urgent care and they gave her this form for Dr Masoud Velez  Form in nurse pending      Any questions please call   [phone]

## 2019-10-03 NOTE — TELEPHONE ENCOUNTER
Called to make appt    [facility] requesting a call back  [place], [place]  Patient needs to be made aware that she has to get her blood work done

## 2019-10-03 NOTE — PROGRESS NOTES
Assessment/Plan:    1  Former consumption of alcohol    2  Essential hypertension  Assessment & Plan:  Stable        3  Hyperlipemia, mixed  Assessment & Plan:  Pt has labs to get drawn ordered already will get with mammo      4  Adverse reaction to statin medication    5  Nodule of apex of right lung  Assessment & Plan:  Pt recently had chest xray    Orders:  -     CT chest wo contrast; Future; Expected date: 10/03/2019    6  TB lung fibrosis, exam unkn        BMI Counseling: Body mass index is 36 6 kg/m²  Discussed the patient's BMI with her  The BMI is above normal  Nutrition recommendations include reducing portion sizes  There are no Patient Instructions on file for this visit  No follow-ups on file  Subjective:      Patient ID: Thomas Brasher is a 62 y o  female  Chief Complaint   Patient presents with    Follow-up     [facility] lpn       Pt states she had a pre employment physical form done at urgent care - pt states she filled out on her intake that she has been sober for 29 years and has a form that needs to fill out  Pt states she stopped on her own , last drink was 29 years ago  No current drug abuse    Pt works from home, does not have work driving duties  She will be going into [employer] for training or meetings but the meet of her job is performed at home      The following portions of the patient's history were reviewed and updated as appropriate: allergies, current medications, past family history, past medical history, past social history, past surgical history and problem list     Review of Systems   Constitutional: Negative  Negative for activity change, appetite change, chills, diaphoresis and fatigue  HENT: Negative  Negative for dental problem, ear pain, sinus pressure and sore throat  Eyes: Negative  Negative for photophobia, pain, discharge, redness, itching and visual disturbance  Respiratory: Negative for apnea and chest tightness  Cardiovascular: Negative  Negative for chest pain, palpitations and leg swelling  Gastrointestinal: Negative  Negative for abdominal distention, abdominal pain, constipation and diarrhea  Endocrine: Negative  Negative for cold intolerance and heat intolerance  Genitourinary: Negative  Negative for difficulty urinating and dyspareunia  Musculoskeletal: Negative  Negative for arthralgias and back pain  Skin: Negative  Allergic/Immunologic: Negative for environmental allergies  Neurological: Negative  Negative for dizziness  Psychiatric/Behavioral: Negative  Negative for agitation  Current Outpatient Medications   Medication Sig Dispense Refill    Calcium Carbonate-Vitamin D (CALCIUM 600+D HIGH POTENCY PO) Take by mouth daily      Cholecalciferol (VITAMIN D3) 5000 units CAPS Take by mouth daily      famotidine (PEPCID) 40 MG tablet Take 1 tablet by mouth daily as needed      Flaxseed, Linseed, (FLAX SEED OIL) 1000 MG CAPS Take 2 capsules by mouth daily       fluticasone (FLONASE) 50 mcg/act nasal spray 2 sprays into each nostril daily 16 g 0    gabapentin (NEURONTIN) 100 mg capsule Take 100 mg by mouth daily      gabapentin (NEURONTIN) 100 mg capsule Take 200 mg by mouth daily at bedtime      ibuprofen (MOTRIN) 200 mg tablet Take by mouth every 6 (six) hours as needed for mild pain      meloxicam (MOBIC) 7 5 mg tablet Take 7 5 mg by mouth daily      Omega-3 Fatty Acids (FISH OIL) 1,000 mg Take 1 capsule (1,000 mg total) by mouth daily (Patient not taking: Reported on 8/26/2019) 90 each 1     No current facility-administered medications for this visit  Objective:    /80   Pulse 78   Temp 98 6 °F (37 °C)   Resp 16   Ht 5' (1 524 m)   Wt 85 kg (187 lb 6 4 oz)   SpO2 98%   BMI 36 60 kg/m²        Physical Exam   Constitutional: She appears well-developed and well-nourished  No distress  HENT:   Head: Normocephalic and atraumatic     Right Ear: External ear normal    Left Ear: External ear normal    Nose: Nose normal    Mouth/Throat: Oropharynx is clear and moist  No oropharyngeal exudate  Eyes: Pupils are equal, round, and reactive to light  EOM are normal  Right eye exhibits no discharge  Left eye exhibits no discharge  No scleral icterus  Neck: No thyromegaly present  Cardiovascular: Normal rate and normal heart sounds  No murmur heard  Pulmonary/Chest: Effort normal and breath sounds normal  No respiratory distress  She has no wheezes  Abdominal: Soft  Bowel sounds are normal  She exhibits no distension and no mass  There is no tenderness  There is no rebound and no guarding  Musculoskeletal: Normal range of motion  Neurological: She is alert  She displays normal reflexes  Coordination normal    Skin: Skin is warm and dry  No rash noted  She is not diaphoretic  No erythema  Psychiatric: She has a normal mood and affect  Her behavior is normal    Nursing note and vitals reviewed               Jayant Escalante DO

## 2019-10-12 LAB — HCV AB SER-ACNC: NEGATIVE

## 2019-10-15 ENCOUNTER — TELEPHONE (OUTPATIENT)
Dept: FAMILY MEDICINE CLINIC | Facility: CLINIC | Age: 54
End: 2019-10-15

## 2019-10-15 PROBLEM — R92.2 DENSE BREAST: Status: ACTIVE | Noted: 2019-10-15

## 2019-10-15 PROBLEM — R92.30 DENSE BREAST: Status: ACTIVE | Noted: 2019-10-15

## 2019-10-15 NOTE — TELEPHONE ENCOUNTER
I dont see this linked to anything however her ct is read if that's what this is about  sarkis [place]

## 2019-10-15 NOTE — TELEPHONE ENCOUNTER
We did get the results to the ct scan - ct scan looks like it shows a granuloma, this is ok  Pt had a mammo which is acceptable and she should repeat with a mammo in a year  Labs were reviewed  Lipids quite elevated  Pt states she cant take statins  Is going to try krill oil

## 2019-10-15 NOTE — TELEPHONE ENCOUNTER
I called patient and she stated that she went where she works for her ct  She gave me a number to call and I called the place and requested medical records to be faxed to us  Called central scheduling and we are waiting on results to be placed in chart    Barbara Bachelor, MA

## 2019-10-15 NOTE — TELEPHONE ENCOUNTER
CT still not in chart  Called Central faxing to look into it   Mammo and labs are in chart  Ana Knowles MA  Waiting on ct

## 2019-11-06 ENCOUNTER — OFFICE VISIT (OUTPATIENT)
Dept: FAMILY MEDICINE CLINIC | Facility: CLINIC | Age: 54
End: 2019-11-06
Payer: COMMERCIAL

## 2019-11-06 VITALS
OXYGEN SATURATION: 98 % | RESPIRATION RATE: 18 BRPM | WEIGHT: 188.8 LBS | DIASTOLIC BLOOD PRESSURE: 80 MMHG | HEIGHT: 60 IN | SYSTOLIC BLOOD PRESSURE: 120 MMHG | TEMPERATURE: 98.9 F | HEART RATE: 85 BPM | BODY MASS INDEX: 37.07 KG/M2

## 2019-11-06 DIAGNOSIS — M50.20 BULGING OF CERVICAL INTERVERTEBRAL DISC: Primary | ICD-10-CM

## 2019-11-06 DIAGNOSIS — M48.02 SPINAL STENOSIS IN CERVICAL REGION: ICD-10-CM

## 2019-11-06 PROCEDURE — 99213 OFFICE O/P EST LOW 20 MIN: CPT | Performed by: NURSE PRACTITIONER

## 2019-11-06 PROCEDURE — 3008F BODY MASS INDEX DOCD: CPT | Performed by: NURSE PRACTITIONER

## 2019-11-06 RX ORDER — MELOXICAM 7.5 MG/1
7.5 TABLET ORAL DAILY
Qty: 90 TABLET | Refills: 0 | Status: SHIPPED | OUTPATIENT
Start: 2019-11-06 | End: 2019-12-27 | Stop reason: SDUPTHER

## 2019-11-06 NOTE — PROGRESS NOTES
Assessment/Plan:  Will start PT and will follow up with ortho if no improvement  Discussed possible side effects of mobic of kidney injury, elevated BP and bleeding risk  1  Bulging of cervical intervertebral disc  -     meloxicam (MOBIC) 7 5 mg tablet; Take 1 tablet (7 5 mg total) by mouth daily  -     Ambulatory referral to Physical Therapy; Future  -     Ambulatory referral to Orthopedic Surgery; Future    2  Spinal stenosis in cervical region  -     meloxicam (MOBIC) 7 5 mg tablet; Take 1 tablet (7 5 mg total) by mouth daily  -     Ambulatory referral to Physical Therapy; Future  -     Ambulatory referral to Orthopedic Surgery; Future          BMI Counseling: Body mass index is 36 87 kg/m²  Discussed the patient's BMI with her  The BMI is above normal  Nutrition recommendations include reducing portion sizes, decreasing overall calorie intake, 3-5 servings of fruits/vegetables daily, reducing fast food intake, consuming healthier snacks and decreasing soda and/or juice intake  Patient Instructions: The patient was advised that NSAID-type medications have two very important potential side effects: gastrointestinal irritation including hemorrhage and renal injuries  She was asked to take the medication with food and to stop if she experiences any GI upset  I asked her to call for vomiting, abdominal pain or black/bloody stools  The patient expresses understanding of these issues and questions were answered  Will check BP at home every day and if started going above 140/90, will stop mobic and will follow up in office  Supportive care discussed and advised  Advised to RTO for any worsening and no improvement  Follow up for no improvement and worsening of conditions  Patient advised and educated when to see immediate medical care  Return if symptoms worsen or fail to improve        Future Appointments   Date Time Provider Jaylyn Paul   12/31/2019 11:15 AM Everett Moss MD NEURO [facility] Subjective:      Patient ID: Dahiana Siddiqui is a 62 y o  female  Chief Complaint   Patient presents with    Neck Pain     for about six months now [id number]         Vitals:  /80   Pulse 85   Temp 98 9 °F (37 2 °C)   Resp 18   Ht 5' (1 524 m)   Wt 85 6 kg (188 lb 12 8 oz)   SpO2 98%   BMI 36 87 kg/m²     HPI    Patient still having neck pain  Had MRI done in 2018 and reviewed report  Stated that mobic helps with pain and would like to go to PT  Under care of neurologist for neuropathy and on gabapentin  The following portions of the patient's history were reviewed and updated as appropriate: allergies, current medications, past family history, past medical history, past social history, past surgical history and problem list       Review of Systems   Constitutional: Negative  Respiratory: Negative  Cardiovascular: Negative  Musculoskeletal: Positive for neck pain  Negative for neck stiffness  Skin: Negative  Psychiatric/Behavioral: Negative            Objective:    Social History     Tobacco Use   Smoking Status Former Smoker    Packs/day: 1 00    Years: 9 00    Pack years: 9 00    Last attempt to quit: [address] Years since quittin 8   Smokeless Tobacco Never Used       Allergies: No Known Allergies      Current Outpatient Medications   Medication Sig Dispense Refill    Calcium Carbonate-Vitamin D (CALCIUM 600+D HIGH POTENCY PO) Take by mouth daily      Cholecalciferol (VITAMIN D3) 5000 units CAPS Take by mouth daily      famotidine (PEPCID) 40 MG tablet Take 1 tablet by mouth daily as needed      Flaxseed, Linseed, (FLAX SEED OIL) 1000 MG CAPS Take 2 capsules by mouth daily       fluticasone (FLONASE) 50 mcg/act nasal spray 2 sprays into each nostril daily 16 g 0    gabapentin (NEURONTIN) 100 mg capsule Take 100 mg by mouth daily      gabapentin (NEURONTIN) 100 mg capsule Take 200 mg by mouth daily at bedtime      ibuprofen (MOTRIN) 200 mg tablet Take by mouth every 6 (six) hours as needed for mild pain      meloxicam (MOBIC) 7 5 mg tablet Take 1 tablet (7 5 mg total) by mouth daily 90 tablet 0    Omega-3 Fatty Acids (FISH OIL) 1,000 mg Take 1 capsule (1,000 mg total) by mouth daily (Patient not taking: Reported on 8/26/2019) 90 each 1     No current facility-administered medications for this visit  Physical Exam   Constitutional: She is oriented to person, place, and time  She appears well-developed and well-nourished  Cardiovascular: Normal rate, regular rhythm and normal heart sounds  Pulmonary/Chest: Effort normal and breath sounds normal    Musculoskeletal: Normal range of motion  She exhibits tenderness (tender on palpation on lower cervical spine area)  She exhibits no edema  Neurological: She is alert and oriented to person, place, and time  Skin: Skin is warm and dry  No rash noted  Psychiatric: She has a normal mood and affect   Her behavior is normal  Judgment and thought content normal                    PAUL Jaime

## 2019-11-14 ENCOUNTER — EVALUATION (OUTPATIENT)
Dept: PHYSICAL THERAPY | Facility: CLINIC | Age: 54
End: 2019-11-14
Payer: COMMERCIAL

## 2019-11-14 DIAGNOSIS — M50.20 BULGING OF CERVICAL INTERVERTEBRAL DISC: ICD-10-CM

## 2019-11-14 DIAGNOSIS — M48.02 SPINAL STENOSIS IN CERVICAL REGION: Primary | ICD-10-CM

## 2019-11-14 PROCEDURE — 97162 PT EVAL MOD COMPLEX 30 MIN: CPT | Performed by: PHYSICAL THERAPIST

## 2019-11-14 PROCEDURE — 97530 THERAPEUTIC ACTIVITIES: CPT | Performed by: PHYSICAL THERAPIST

## 2019-11-14 NOTE — PROGRESS NOTES
PT Evaluation     Today's date: 2019  Patient name: Airam Thomas  : 1965  MRN: [id number]  Referring provider: PAUL Gonzalez  Dx:   Encounter Diagnosis     ICD-10-CM    1  Spinal stenosis in cervical region M48 02 Ambulatory referral to Physical Therapy   2  Bulging of cervical intervertebral disc M50 20 Ambulatory referral to Physical Therapy       Start Time: 1100  Stop Time: 1200  Total time in clinic (min): 60 minutes    Assessment  Assessment details: Airam Thomas is a 62 y o  female who presents with pain and decreased ROM  Due to these impairments, patient has difficulty performing recreational activities, work-related activities  Patient's clinical presentation is consistent with their referring diagnosis of Spinal stenosis in cervical region  (primary encounter diagnosis), Bulging of cervical intervertebral disc  Patient has been educated in home exercise program and plan of care which is focused at this time primarily on posture management  At this time she is inconsistent with symptom identification as baseline measurements included distal most seemingly relevant symptoms which post evaluation pt then attributes to having walked into office from cold weather and indeed not her typical symptoms  Pt further indicates correlation of symptoms with emotional status which is further correlated to emotional trauma due to tragic loss of family member in 2016    Patient would benefit from skilled physical therapy services to address their aforementioned functional limitations and progress towards prior level of function and independence with home exercise program      Impairments: abnormal or restricted ROM, activity intolerance, impaired physical strength, lacks appropriate home exercise program, pain with function, poor posture  and poor body mechanics  Understanding of Dx/Px/POC: good   Prognosis: good    Goals  Short Term Goals to be accomplished in 3 weeks:  STG1: Pt will be I with HEP  STG2: Pt will be I with posture management  STG3: Pt will demo Cervical AROM >50% improvement  STG4: Pt will demo 1/2 MMT grade inc in cervical stabilizers and shoulder strength  STG5: Pt will deny symptom radiation beyond shoulder at <50% intensity   STG6: Pt will deny sleep disturbance due to pain     Long Term Goals to be accomplished in 6 weeks:   LTG1: Pt will demo cervical stab/shoulder strength to WNL as per PLOF  LTG2: Pt will return to work duties as per PLOF pain free  LTG3: Pt will demo cervical AROM WNL      Plan  Plan details:  HEP development, stretching, strengthening, A/AA/PROM, joint mobilizations, posture education, STM/MI as needed to reduce muscle tension, muscle reeducation, PLOC discussed and agreed upon with patient  Patient would benefit from: PT eval and skilled physical therapy  Planned modality interventions: cryotherapy and thermotherapy: hydrocollator packs  Planned therapy interventions: manual therapy, neuromuscular re-education, self care, therapeutic activities, therapeutic exercise and home exercise program  Frequency: 2x week  Duration in weeks: 6  Treatment plan discussed with: patient        Subjective Evaluation    History of Present Illness  Mechanism of injury: Pt reports onset of neck and general radicular symptoms and parasthesias in B UEs and LEs following emotional trauma related ot the death of a close family member in February 2016  She reports symptoms felt on R side of C/S which feel like a bruise just lateral to spinous process, as decribed  She also has freaquent B hand and finger numbness which she is unsure if it is related to excessive typing on computer or cold weather and not wearing gloves  She also noticing pain just above her elbows and just above her knees as well as numbness in her toes on R foot  Pt works 60 hours weekly as a  and is seated at computer for extensive durations   In her leisure she likes to pay [employer], watch TV and previously had been participating in karate which she is unable to participate in now due to her symptoms  She rates her pain 0-10/10 at worst, currently /10  Pt feels that prolonged sitting impacts her symptoms as well as slouching, her ability to visually scan while turning her head is limited, she is more stuff in the morning and symptoms decrease as the day goes on stiffness-wise and she feels better when on the move  Looking upwards increases her pain as does stress  Pt denies disturbed sleep  Pt acknowledges history of LBP  She has been in episodic flare ups of her symptoms without awareness as to correlation besides stress  Pt has had PT prior to this which consisted of passive modalities and general UE exercises which she did not follow up with  She has also been taking gabapentin and meloxicam which she feels minimizes her symptoms  Quality of life: fair    Pain  Current pain ratin  At best pain ratin  At worst pain rating: 10          Objective     General Comments:      Cervical/Thoracic Comments  Baseline: 3/10 B hand tingling, "bruise like" discomfort R C/S  Posture Correction: No effect  Neuro: Motor, sensation and reflexes intact  Movement loss; Cervical AROM: Protrusion, Flexion WNL pain free  Retraction trace loss painfree, extension major loss pain free  Lateral flexion R mod loss crepitus, lateral flexion L mod loss, Rotation R min loss, Rotation L min loss painful  Repeated movement testing: Rep Retraction x10 NE, Rep Ret with self OP at chin x10 NE, Rep Ret Ext x5 NE  Rep flexion x10 NE        Flowsheet Rows      Most Recent Value   PT/OT G-Codes   Current Score  39   Projected Score  56   FOTO information reviewed  Yes             Precautions: Standard        Manual                                                                                   Exercise Diary  1            Posture Edu 10'            CS Rep ret 10x HEP Edu/initiated                Modalities

## 2019-11-20 ENCOUNTER — OFFICE VISIT (OUTPATIENT)
Dept: PHYSICAL THERAPY | Facility: CLINIC | Age: 54
End: 2019-11-20
Payer: COMMERCIAL

## 2019-11-20 DIAGNOSIS — M48.02 SPINAL STENOSIS IN CERVICAL REGION: ICD-10-CM

## 2019-11-20 DIAGNOSIS — M50.20 BULGING OF CERVICAL INTERVERTEBRAL DISC: Primary | ICD-10-CM

## 2019-11-20 PROCEDURE — 97110 THERAPEUTIC EXERCISES: CPT | Performed by: PHYSICAL THERAPIST

## 2019-11-20 NOTE — PROGRESS NOTES
Daily Note     Today's date: 2019  Patient name: Manuel Morales  : 1965  MRN: [id number]  Referring provider: PAUL Soria  Dx:   Encounter Diagnosis     ICD-10-CM    1  Bulging of cervical intervertebral disc M50 20    2  Spinal stenosis in cervical region M48 02        Start Time: 1700  Stop Time: 1745  Total time in clinic (min): 45 minutes    Subjective: Pt reports she been performing her HEP 3-4x/daily and has not been taking gabapentin and meloxicam  She felt her exercise was uncomfortable and when she decreased the force of her exercise she felt her symptoms did feel better  She is expressing pain in extremities, throughout her entire back, hands and B LEs and numbness in feet  Pt reports tonight she only feels stiffness  She reports she was bale to do karate Monday night, "I felt pretty good afterward " Pt reports she I sitting up straighter with better lumbar support  Objective: See treatment diary below  HEP performed correctly  Baseline collection today replicating reaching for coffee pot with provocation or R sided upper trapezius pain  Repeated C/S retraction NE, progression for self OP fingers at chin with dec/B results  Assessment: Tolerated treatment well  Patient demo ongoing goof response to repeated motions at this time and generally slow yet appropriate progression at this timr with saggittal pane repeate dmotions  Plan: Continue per plan of care  Precautions: Standard        Manual                                                                                  Exercise Diary  1 2           Posture Edu 10' rev           CS Rep ret 10x Rev 2x10           CS Rep ret self OP at chin  2x10                                                                                                                                                                                                                           HEP Edu/initiated rev Modalities

## 2019-11-27 ENCOUNTER — APPOINTMENT (OUTPATIENT)
Dept: PHYSICAL THERAPY | Facility: CLINIC | Age: 54
End: 2019-11-27
Payer: COMMERCIAL

## 2019-12-04 ENCOUNTER — OFFICE VISIT (OUTPATIENT)
Dept: URGENT CARE | Facility: CLINIC | Age: 54
End: 2019-12-04
Payer: COMMERCIAL

## 2019-12-04 ENCOUNTER — OFFICE VISIT (OUTPATIENT)
Dept: PHYSICAL THERAPY | Facility: CLINIC | Age: 54
End: 2019-12-04
Payer: COMMERCIAL

## 2019-12-04 VITALS
HEIGHT: 60 IN | TEMPERATURE: 98.7 F | DIASTOLIC BLOOD PRESSURE: 82 MMHG | HEART RATE: 92 BPM | OXYGEN SATURATION: 98 % | SYSTOLIC BLOOD PRESSURE: 148 MMHG | WEIGHT: 188 LBS | RESPIRATION RATE: 18 BRPM | BODY MASS INDEX: 36.91 KG/M2

## 2019-12-04 DIAGNOSIS — M48.02 SPINAL STENOSIS IN CERVICAL REGION: Primary | ICD-10-CM

## 2019-12-04 DIAGNOSIS — M50.20 BULGING OF CERVICAL INTERVERTEBRAL DISC: ICD-10-CM

## 2019-12-04 DIAGNOSIS — J20.9 ACUTE BRONCHITIS, UNSPECIFIED ORGANISM: Primary | ICD-10-CM

## 2019-12-04 PROCEDURE — 99213 OFFICE O/P EST LOW 20 MIN: CPT | Performed by: PHYSICIAN ASSISTANT

## 2019-12-04 PROCEDURE — 97530 THERAPEUTIC ACTIVITIES: CPT | Performed by: PHYSICAL THERAPIST

## 2019-12-04 RX ORDER — AMOXICILLIN 500 MG/1
CAPSULE ORAL
Refills: 0 | COMMUNITY
Start: 2019-11-26 | End: 2019-12-25

## 2019-12-04 RX ORDER — BENZONATATE 200 MG/1
200 CAPSULE ORAL 3 TIMES DAILY PRN
Qty: 20 CAPSULE | Refills: 0 | Status: SHIPPED | OUTPATIENT
Start: 2019-12-04 | End: 2019-12-25

## 2019-12-04 RX ORDER — METHYLPREDNISOLONE 4 MG/1
TABLET ORAL
Qty: 21 EACH | Refills: 0 | Status: SHIPPED | OUTPATIENT
Start: 2019-12-04 | End: 2019-12-25

## 2019-12-04 NOTE — PROGRESS NOTES
Daily Note /Discharge    Today's date: 2019  Patient name: Samara Ledezma  : 1965  MRN: [id number]  Referring provider: PAUL Clifton  Dx:   Encounter Diagnosis     ICD-10-CM    1  Spinal stenosis in cervical region M48 02    2  Bulging of cervical intervertebral disc M50 20      Start Time: 0545  Stop Time: 0610  Total time in clinic (min): 25 minutes  Subjective: Pt states neck feels good and that she is in 0/10 pain  She reports doing retractions several times per day since her last visit  She reports a head cold has caused her to slow her exercises in recent days  Pt reports she is not experiencing any deficits functionally as it pertains to her neck, pt indicating return to PLOF at this time  Objective: See treatment diary below  Cervical AROM WFL, pain free :slight stiffness B rotation " HEP technique good, no correction needed  Goals  Short Term Goals to be accomplished in 3 weeks: - met  STG1: Pt will be I with HEP  STG2: Pt will be I with posture management  STG3: Pt will demo Cervical AROM >50% improvement  STG4: Pt will demo 1/2 MMT grade inc in cervical stabilizers and shoulder strength  STG5: Pt will deny symptom radiation beyond shoulder at <50% intensity   STG6: Pt will deny sleep disturbance due to pain     Long Term Goals to be accomplished in 6 weeks: -met  LTG1: Pt will demo cervical stab/shoulder strength to WNL as per PLOF  LTG2: Pt will return to work duties as per PLOF pain free  LTG3: Pt will demo cervical AROM WNL    Assessment: Tolerated treatment well  Patient at this time demo good return to [facility] as it pertains to cervical spine pain and deficits  Pt is I with HEP and at this time is clear for DC to maintain current status  Pt has been edu to prevention and signs of recurrence and demo good understating  Pt has been a pleasure to work with  Plan: DC pt to I HEP  Precautions: Standard        Manual   Exercise Diary  1 2 3          Posture Edu 10' rev rev          CS Rep ret 10x Rev 2x10 rev          CS Rep ret self OP at chin  2x10 rev                                                                                        HEP Edu/initiated rev rev              Modalities

## 2019-12-05 NOTE — PATIENT INSTRUCTIONS
Acute Bronchitis   WHAT YOU NEED TO KNOW:   Acute bronchitis is swelling and irritation in the air passages of your lungs  This irritation may cause you to cough or have other breathing problems  Acute bronchitis often starts because of another illness, such as a cold or the flu  The illness spreads from your nose and throat to your windpipe and airways  Bronchitis is often called a chest cold  Acute bronchitis lasts about 3 to 6 weeks and is usually not a serious illness  Your cough can last for several weeks  DISCHARGE INSTRUCTIONS:   Return to the emergency department if:   · You cough up blood  · Your lips or fingernails turn blue  · You feel like you are not getting enough air when you breathe  Contact your healthcare provider if:   · You have a fever  · Your breathing problems do not go away or get worse  · Your cough does not get better within 4 weeks  · You have questions or concerns about your condition or care  Self-care:   · Get more rest   Rest helps your body to heal  Slowly start to do more each day  Rest when you feel it is needed  · Avoid irritants in the air  Avoid chemicals, fumes, and dust  Wear a face mask if you must work around dust or fumes  Stay inside on days when air pollution levels are high  If you have allergies, stay inside when pollen counts are high  Do not use aerosol products, such as spray-on deodorant, bug spray, and hair spray  · Do not smoke or be around others who smoke  Nicotine and other chemicals in cigarettes and cigars damages the cilia that move mucus out of your lungs  Ask your healthcare provider for information if you currently smoke and need help to quit  E-cigarettes or smokeless tobacco still contain nicotine  Talk to your healthcare provider before you use these products  · Drink liquids as directed  Liquids help keep your air passages moist and help you cough up mucus   You may need to drink more liquids when you have acute bronchitis  Ask how much liquid to drink each day and which liquids are best for you  · Use a humidifier or vaporizer  Use a cool mist humidifier or a vaporizer to increase air moisture in your home  This may make it easier for you to breathe and help decrease your cough  Decrease risk for acute bronchitis:   · Get the vaccinations you need  Ask your healthcare provider if you should get vaccinated against the flu or pneumonia  · Prevent the spread of germs  You can decrease your risk of acute bronchitis and other illnesses by doing the following:     [facility] your hands often with soap and water  Carry germ-killing hand lotion or gel with you  You can use the lotion or gel to clean your hands when soap and water are not available  ¨ Do not touch your eyes, nose, or mouth unless you have washed your hands first     ¨ Always cover your mouth when you cough to prevent the spread of germs  It is best to cough into a tissue or your shirt sleeve instead of into your hand  Ask those around you cover their mouths when they cough  ¨ Try to avoid people who have a cold or the flu  If you are sick, stay away from others as much as possible  Medicines: Your healthcare provider may  give you any of the following:  · Ibuprofen or acetaminophen  are medicines that help lower your fever  They are available without a doctor's order  Ask your healthcare provider which medicine is right for you  Ask how much to take and how often to take it  Follow directions  These medicines can cause stomach bleeding if not taken correctly  Ibuprofen can cause kidney damage  Do not take ibuprofen if you have kidney disease, an ulcer, or allergies to aspirin  Acetaminophen can cause liver damage  Do not take more than 4,000 milligrams in 24 hours  · Decongestants  help loosen mucus in your lungs and make it easier to cough up  This can help you breathe easier  · Cough suppressants  decrease your urge to cough   If your cough produces mucus, do not take a cough suppressant unless your healthcare provider tells you to  Your healthcare provider may suggest that you take a cough suppressant at night so you can rest     · Inhalers  may be given  Your healthcare provider may give you one or more inhalers to help you breathe easier and cough less  An inhaler gives your medicine to open your airways  Ask your healthcare provider to show you how to use your inhaler correctly  · Take your medicine as directed  Contact your healthcare provider if you think your medicine is not helping or if you have side effects  Tell him of her if you are allergic to any medicine  Keep a list of the medicines, vitamins, and herbs you take  Include the amounts, and when and why you take them  Bring the list or the pill bottles to follow-up visits  Carry your medicine list with you in case of an emergency  Follow up with your healthcare provider as directed:  Write down questions you have so you will remember to ask them during your follow-up visits  © 2017 [employer] Information is for End User's use only and may not be sold, redistributed or otherwise used for commercial purposes  All illustrations and images included in CareNotes® are the copyrighted property of [employer]  or [employer]  The above information is an  only  It is not intended as medical advice for individual conditions or treatments  Talk to your doctor, nurse or pharmacist before following any medical regimen to see if it is safe and effective for you

## 2019-12-05 NOTE — PROGRESS NOTES
[employer] [facility]        NAME: Viridiana Del Cid is a 62 y o  female  : 1965    MRN: [id number]  DATE: 2019  TIME: 4:45 PM    Assessment and Plan   Acute bronchitis, unspecified organism [J20 9]  1  Acute bronchitis, unspecified organism  benzonatate (TESSALON) 200 MG capsule    methylPREDNISolone 4 MG tablet therapy pack         Patient Instructions     Discussed condition with pt  I will treat her acute bronchitis with a MDP and Tessalon Perles and rec hydration, rest, continuing Robitussin PRN, and observation  Follow up with PCP in 3-5 days  Proceed to  ER if symptoms worsen  Chief Complaint     Chief Complaint   Patient presents with    URI     pt reports worsening cough with chest congestion and right sided chest pain         History of Present Illness       Pt presents with a 1 week history of what she reports started as cold/URI symptoms  She reports she had a piece of dental floss stuck between two of her right upper teeth and she had it taken out and was put on Amoxicillin one week ago and she is near the end of her course  She has a dry cough, chest tightness but no SOB or wheezing  Denies nasal/sinus congestion, ST, fever, chills, N/V/D  She has taken Robitussin DM  Has asthma but no allergies  Does not smoke or vape  She has had the flu shot  Review of Systems   Review of Systems   Constitutional: Negative  Respiratory: Positive for cough and chest tightness  Negative for shortness of breath and wheezing  Cardiovascular: Negative  Gastrointestinal: Negative  Genitourinary: Negative            Current Medications       Current Outpatient Medications:     amoxicillin (AMOXIL) 500 mg capsule, take 1 capsule by mouth three times a day  FOR 10 DAYS, Disp: , Rfl: 0    benzonatate (TESSALON) 200 MG capsule, Take 1 capsule (200 mg total) by mouth 3 (three) times a day as needed for cough, Disp: 20 capsule, Rfl: 0    Calcium Carbonate-Vitamin D (CALCIUM 600+D HIGH POTENCY PO), Take by mouth daily, Disp: , Rfl:     Cholecalciferol (VITAMIN D3) 5000 units CAPS, Take by mouth daily, Disp: , Rfl:     famotidine (PEPCID) 40 MG tablet, Take 1 tablet by mouth daily as needed, Disp: , Rfl:     Flaxseed, Linseed, (FLAX SEED OIL) 1000 MG CAPS, Take 2 capsules by mouth daily , Disp: , Rfl:     fluticasone (FLONASE) 50 mcg/act nasal spray, 2 sprays into each nostril daily, Disp: 16 g, Rfl: 0    gabapentin (NEURONTIN) 100 mg capsule, Take 100 mg by mouth daily, Disp: , Rfl:     gabapentin (NEURONTIN) 100 mg capsule, Take 200 mg by mouth daily at bedtime, Disp: , Rfl:     ibuprofen (MOTRIN) 200 mg tablet, Take by mouth every 6 (six) hours as needed for mild pain, Disp: , Rfl:     meloxicam (MOBIC) 7 5 mg tablet, Take 1 tablet (7 5 mg total) by mouth daily, Disp: 90 tablet, Rfl: 0    methylPREDNISolone 4 MG tablet therapy pack, Use as directed on package, Disp: 21 each, Rfl: 0    Omega-3 Fatty Acids (FISH OIL) 1,000 mg, Take 1 capsule (1,000 mg total) by mouth daily (Patient not taking: Reported on 8/26/2019), Disp: 90 each, Rfl: 1    Current Allergies     Allergies as of 12/04/2019    (No Known Allergies)            The following portions of the patient's history were reviewed and updated as appropriate: allergies, current medications, past family history, past medical history, past social history, past surgical history and problem list      Past Medical History:   Diagnosis Date    Abscess of back     Resolved 1/11/2018     Adverse reaction to statin medication     Resolved 1/11/2018     Alcoholism ([facility] [address] )     AGE 27    Anesthesia complication     extreme dizziness upon awakening    Anxiety     Chronic pain disorder      rt side pinched nerve    Depression     Edema     GERD (gastroesophageal reflux disease)     Hyperlipidemia     Hypertension     Irregular heart beat     palpitations    Irritable bowel syndrome     Liver disease     fatty    Neck pain     Pinched nerve     Last assessed 9/26/2017     PONV (postoperative nausea and vomiting)     Tuberculosis     1986       Past Surgical History:   Procedure Laterality Date    CARDIAC CATHETERIZATION      due to brothers advanced heart disease and abnormal stress test    COLONOSCOPY N/A 12/6/2017    Procedure: COLONOSCOPY;  Surgeon: Nima Barton MD;  Location: [facility]; Service: Gastroenterology    COLONOSCOPY  12    ELBOW SURGERY Right     tendon repair, post op nausea and dizziness    ESOPHAGOGASTRODUODENOSCOPY N/A 12/6/2017    Procedure: ESOPHAGOGASTRODUODENOSCOPY (EGD); Surgeon: Nima Barton MD;  Location: [facility]; Service: Gastroenterology    ESOPHAGOGASTRODUODENOSCOPY  1986    Diagnostic     SKIN BIOPSY      Last assessed 9/28/2017     [de-identified] TOOTH EXTRACTION         Family History   Problem Relation Age of Onset    Alzheimer's disease Mother     Thyroid disease Mother     Edema Mother     Osteoarthritis Mother         Knee    Stroke Father     Hypertension Father     Hyperlipidemia Father     Arthritis Father     Coronary artery disease Father     Hypothyroidism Father     Other Father         status post coronary artery bypass graft     Stroke Sister     Heart failure Sister     Heart disease Sister     Drug abuse Brother     Completed Suicide  Brother          Medications have been verified  Objective   /82   Pulse 92   Temp 98 7 °F (37 1 °C)   Resp 18   Ht 5' (1 524 m)   Wt 85 3 kg (188 lb)   SpO2 98%   BMI 36 72 kg/m²        Physical Exam     Physical Exam   Constitutional: She is oriented to person, place, and time  She appears well-developed and well-nourished  No distress  HENT:   Right Ear: Hearing, tympanic membrane, external ear and ear canal normal    Left Ear: Hearing, tympanic membrane, external ear and ear canal normal    Nose: Nose normal    Mouth/Throat: Oropharynx is clear and moist and mucous membranes are normal    Neck: Neck supple  Cardiovascular: Normal rate, regular rhythm and normal heart sounds  No murmur heard  Pulmonary/Chest: Effort normal  She has no wheezes  She has rhonchi (B/L diffuse coarse breath sounds heard throughout)  Lymphadenopathy:     She has no cervical adenopathy  Neurological: She is alert and oriented to person, place, and time  Psychiatric: She has a normal mood and affect  Vitals reviewed

## 2019-12-25 ENCOUNTER — APPOINTMENT (EMERGENCY)
Dept: RADIOLOGY | Facility: HOSPITAL | Age: 54
End: 2019-12-25
Payer: COMMERCIAL

## 2019-12-25 ENCOUNTER — HOSPITAL ENCOUNTER (EMERGENCY)
Facility: HOSPITAL | Age: 54
Discharge: HOME | End: 2019-12-25
Attending: EMERGENCY MEDICINE | Admitting: EMERGENCY MEDICINE
Payer: COMMERCIAL

## 2019-12-25 VITALS
BODY MASS INDEX: 37.85 KG/M2 | DIASTOLIC BLOOD PRESSURE: 97 MMHG | WEIGHT: 193.8 LBS | OXYGEN SATURATION: 97 % | SYSTOLIC BLOOD PRESSURE: 141 MMHG | HEART RATE: 90 BPM | TEMPERATURE: 98.4 F | RESPIRATION RATE: 20 BRPM

## 2019-12-25 DIAGNOSIS — R07.9 CHEST PAIN: Primary | ICD-10-CM

## 2019-12-25 DIAGNOSIS — R07.89 CHEST WALL PAIN: ICD-10-CM

## 2019-12-25 LAB
ALBUMIN SERPL BCP-MCNC: 3.8 G/DL (ref 3.5–5)
ALP SERPL-CCNC: 79 U/L (ref 46–116)
ALT SERPL W P-5'-P-CCNC: 64 U/L (ref 12–78)
ANION GAP SERPL CALCULATED.3IONS-SCNC: 6 MMOL/L (ref 4–13)
APTT PPP: 30 SECONDS (ref 25–32)
AST SERPL W P-5'-P-CCNC: 32 U/L (ref 5–45)
BASOPHILS # BLD AUTO: 0.03 THOUSANDS/ΜL (ref 0–0.1)
BASOPHILS NFR BLD AUTO: 0 % (ref 0–1)
BILIRUB SERPL-MCNC: 1.1 MG/DL (ref 0.2–1)
BUN SERPL-MCNC: 12 MG/DL (ref 5–25)
CALCIUM SERPL-MCNC: 8.8 MG/DL (ref 8.3–10.1)
CHLORIDE SERPL-SCNC: 106 MMOL/L (ref 100–108)
CO2 SERPL-SCNC: 30 MMOL/L (ref 21–32)
CREAT SERPL-MCNC: 0.95 MG/DL (ref 0.6–1.3)
EOSINOPHIL # BLD AUTO: 0.21 THOUSAND/ΜL (ref 0–0.61)
EOSINOPHIL NFR BLD AUTO: 3 % (ref 0–6)
ERYTHROCYTE [DISTWIDTH] IN BLOOD BY AUTOMATED COUNT: 11.8 % (ref 11.6–15.1)
GFR SERPL CREATININE-BSD FRML MDRD: 67 ML/MIN/1.73SQ M
GLUCOSE SERPL-MCNC: 102 MG/DL (ref 65–140)
HCT VFR BLD AUTO: 42.8 % (ref 34.8–46.1)
HGB BLD-MCNC: 14.3 G/DL (ref 11.5–15.4)
IMM GRANULOCYTES # BLD AUTO: 0.02 THOUSAND/UL (ref 0–0.2)
IMM GRANULOCYTES NFR BLD AUTO: 0 % (ref 0–2)
INR PPP: 0.94 (ref 0.91–1.09)
LIPASE SERPL-CCNC: 418 U/L (ref 73–393)
LYMPHOCYTES # BLD AUTO: 2.35 THOUSANDS/ΜL (ref 0.6–4.47)
LYMPHOCYTES NFR BLD AUTO: 34 % (ref 14–44)
MAGNESIUM SERPL-MCNC: 1.9 MG/DL (ref 1.6–2.6)
MCH RBC QN AUTO: 29.2 PG (ref 26.8–34.3)
MCHC RBC AUTO-ENTMCNC: 33.4 G/DL (ref 31.4–37.4)
MCV RBC AUTO: 88 FL (ref 82–98)
MONOCYTES # BLD AUTO: 1.01 THOUSAND/ΜL (ref 0.17–1.22)
MONOCYTES NFR BLD AUTO: 15 % (ref 4–12)
NEUTROPHILS # BLD AUTO: 3.26 THOUSANDS/ΜL (ref 1.85–7.62)
NEUTS SEG NFR BLD AUTO: 48 % (ref 43–75)
NRBC BLD AUTO-RTO: 0 /100 WBCS
PLATELET # BLD AUTO: 207 THOUSANDS/UL (ref 149–390)
PMV BLD AUTO: 10.3 FL (ref 8.9–12.7)
POTASSIUM SERPL-SCNC: 3.7 MMOL/L (ref 3.5–5.3)
PROT SERPL-MCNC: 7.4 G/DL (ref 6.4–8.2)
PROTHROMBIN TIME: 10.1 SECONDS (ref 9.8–12)
RBC # BLD AUTO: 4.89 MILLION/UL (ref 3.81–5.12)
SODIUM SERPL-SCNC: 142 MMOL/L (ref 136–145)
TROPONIN I SERPL-MCNC: <0.02 NG/ML
WBC # BLD AUTO: 6.88 THOUSAND/UL (ref 4.31–10.16)

## 2019-12-25 PROCEDURE — 84484 ASSAY OF TROPONIN QUANT: CPT | Performed by: EMERGENCY MEDICINE

## 2019-12-25 PROCEDURE — 36415 COLL VENOUS BLD VENIPUNCTURE: CPT | Performed by: EMERGENCY MEDICINE

## 2019-12-25 PROCEDURE — 85025 COMPLETE CBC W/AUTO DIFF WBC: CPT | Performed by: EMERGENCY MEDICINE

## 2019-12-25 PROCEDURE — 85610 PROTHROMBIN TIME: CPT | Performed by: EMERGENCY MEDICINE

## 2019-12-25 PROCEDURE — 80053 COMPREHEN METABOLIC PANEL: CPT | Performed by: EMERGENCY MEDICINE

## 2019-12-25 PROCEDURE — 85730 THROMBOPLASTIN TIME PARTIAL: CPT | Performed by: EMERGENCY MEDICINE

## 2019-12-25 PROCEDURE — 93005 ELECTROCARDIOGRAM TRACING: CPT

## 2019-12-25 PROCEDURE — 83735 ASSAY OF MAGNESIUM: CPT | Performed by: EMERGENCY MEDICINE

## 2019-12-25 PROCEDURE — 96360 HYDRATION IV INFUSION INIT: CPT

## 2019-12-25 PROCEDURE — 99285 EMERGENCY DEPT VISIT HI MDM: CPT

## 2019-12-25 PROCEDURE — 71275 CT ANGIOGRAPHY CHEST: CPT

## 2019-12-25 PROCEDURE — 83690 ASSAY OF LIPASE: CPT | Performed by: EMERGENCY MEDICINE

## 2019-12-25 RX ORDER — NAPROXEN 500 MG/1
500 TABLET ORAL 2 TIMES DAILY WITH MEALS
Qty: 10 TABLET | Refills: 0 | Status: SHIPPED | OUTPATIENT
Start: 2019-12-25 | End: 2019-12-31

## 2019-12-25 RX ADMIN — IOHEXOL 85 ML: 350 INJECTION, SOLUTION INTRAVENOUS at 15:59

## 2019-12-25 RX ADMIN — SODIUM CHLORIDE 1000 ML: 0.9 INJECTION, SOLUTION INTRAVENOUS at 15:14

## 2019-12-25 NOTE — ED PROVIDER NOTES
History  Chief Complaint   Patient presents with    Chest Pain     Pt c/o chest pain with breathing since yesterday  Pt was seen at urgent care yesterday with normal ekg and xrays  Pt sts, "It wasn't getting better it got worse all of a sudden so I came in  My breathing is off"    Shortness of Breath     61 y/o female presents with pleurisy,dyspnea, chest pain, left sided mostly at the breast area  No fevers,chills,admits to non productive cough  Denies any diaphoresis, nausea,vomiting, or any other symptoms  History of cardiac catherization 3 years ago which was normal per patient  She said the pain is continuous for over 24 hours  Not a smoker, family history of CAD  History provided by:  Patient   used: No        Prior to Admission Medications   Prescriptions Last Dose Informant Patient Reported? Taking?    Calcium Carbonate-Vitamin D (CALCIUM 600+D HIGH POTENCY PO) 2019 at Unknown time Self Yes Yes   Sig: Take by mouth daily   Cholecalciferol (VITAMIN D3) 5000 units CAPS 2019 at Unknown time Self Yes Yes   Sig: Take by mouth daily   Flaxseed, Linseed, (FLAX SEED OIL) 1000 MG CAPS 2019 at Unknown time Self Yes Yes   Sig: Take 2 capsules by mouth daily    famotidine (PEPCID) 40 MG tablet 2019 at Unknown time  Yes Yes   Sig: Take 1 tablet by mouth daily as needed   fluticasone (FLONASE) 50 mcg/act nasal spray 2019 at Unknown time  No Yes   Si sprays into each nostril daily   gabapentin (NEURONTIN) 100 mg capsule 2019 at Unknown time  Yes Yes   Sig: Take 100 mg by mouth daily   gabapentin (NEURONTIN) 100 mg capsule 2019 at Unknown time  Yes Yes   Sig: Take 200 mg by mouth daily at bedtime   ibuprofen (MOTRIN) 200 mg tablet Past Month at Unknown time  Yes Yes   Sig: Take by mouth every 6 (six) hours as needed for mild pain   meloxicam (MOBIC) 7 5 mg tablet 2019 at Unknown time  No Yes   Sig: Take 1 tablet (7 5 mg total) by mouth daily Facility-Administered Medications: None       Past Medical History:   Diagnosis Date    Abscess of back     Resolved 1/11/2018     Adverse reaction to statin medication     Resolved 1/11/2018     Alcoholism ([address] )     AGE 27    Anesthesia complication     extreme dizziness upon awakening    Anxiety     Chronic pain disorder      rt side pinched nerve    Depression     Edema     GERD (gastroesophageal reflux disease)     Hyperlipidemia     Hypertension     Irregular heart beat     palpitations    Irritable bowel syndrome     Liver disease     fatty    Neck pain     Pinched nerve     Last assessed 9/26/2017     PONV (postoperative nausea and vomiting)     Tuberculosis     1986       Past Surgical History:   Procedure Laterality Date    CARDIAC CATHETERIZATION      due to brothers advanced heart disease and abnormal stress test    COLONOSCOPY N/A 12/6/2017    Procedure: COLONOSCOPY;  Surgeon: Inna Aj MD;  Location: [facility] GI LAB; Service: Gastroenterology    COLONOSCOPY  12    ELBOW SURGERY Right     tendon repair, post op nausea and dizziness    ESOPHAGOGASTRODUODENOSCOPY N/A 12/6/2017    Procedure: ESOPHAGOGASTRODUODENOSCOPY (EGD); Surgeon: Inna Aj MD;  Location: [facility] GI [facility];   Service: Gastroenterology    ESOPHAGOGASTRODUODENOSCOPY  1986    Diagnostic     SKIN BIOPSY      Last assessed 9/28/2017     [de-identified] TOOTH EXTRACTION         Family History   Problem Relation Age of Onset    Alzheimer's disease Mother     Thyroid disease Mother     Edema Mother     Osteoarthritis Mother         Knee    Stroke Father     Hypertension Father     Hyperlipidemia Father     Arthritis Father     Coronary artery disease Father     Hypothyroidism Father     Other Father         status post coronary artery bypass graft     Stroke Sister     Heart failure Sister     Heart disease Sister     Drug abuse Brother     Completed Suicide  Brother      I have reviewed and agree with the history as documented  Social History     Tobacco Use    Smoking status: Former Smoker     Packs/day: 1 00     Years: 9 00     Pack years: 9 00     Last attempt to quit:      Years since quittin 0    Smokeless tobacco: Never Used   Substance Use Topics    Alcohol use: No     Comment: 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    Drug use: No        Review of Systems   All other systems reviewed and are negative  Physical Exam  Physical Exam   Constitutional: She is oriented to person, place, and time  She appears well-developed and well-nourished  HENT:   Head: Normocephalic and atraumatic  Eyes: Pupils are equal, round, and reactive to light  EOM are normal    Neck: Normal range of motion  Neck supple  Cardiovascular: Normal rate and regular rhythm  Pulmonary/Chest: Effort normal and breath sounds normal    Abdominal: Soft  Bowel sounds are normal    Musculoskeletal: Normal range of motion  Chest wall tenderness bilaterally, no crepitus,    Neurological: She is alert and oriented to person, place, and time  Skin: Skin is warm and dry  Psychiatric: She has a normal mood and affect  Nursing note and vitals reviewed        Vital Signs  ED Triage Vitals [19 1456]   Temperature Pulse Respirations Blood Pressure SpO2   98 4 °F (36 9 °C) 96 18 169/85 98 %      Temp Source Heart Rate Source Patient Position - Orthostatic VS BP Location FiO2 (%)   Oral Monitor Sitting Left arm --      Pain Score       7           Vitals:    19 1456 19 1715   BP: 169/85 141/97   Pulse: 96 90   Patient Position - Orthostatic VS: Sitting          Visual Acuity      ED Medications  Medications   sodium chloride 0 9 % bolus 1,000 mL (0 mL Intravenous Stopped 19 1614)   iohexol (OMNIPAQUE) 350 MG/ML injection (MULTI-DOSE) 85 mL (85 mL Intravenous Given 19 1559)       Diagnostic Studies  Results Reviewed     Procedure Component Value Units Date/Time    Troponin I [[id number]]  (Normal) Collected:  12/25/19 1512    Lab Status:  Final result Specimen:  Blood from Arm, Left Updated:  12/25/19 1540     Troponin I <0 02 ng/mL     Comprehensive metabolic panel [[id number]]  (Abnormal) Collected:  12/25/19 1512    Lab Status:  Final result Specimen:  Blood from Arm, Left Updated:  12/25/19 1538     Sodium 142 mmol/L      Potassium 3 7 mmol/L      Chloride 106 mmol/L      CO2 30 mmol/L      ANION GAP 6 mmol/L      BUN 12 mg/dL      Creatinine 0 95 mg/dL      Glucose 102 mg/dL      Calcium 8 8 mg/dL      AST 32 U/L      ALT 64 U/L      Alkaline Phosphatase 79 U/L      Total Protein 7 4 g/dL      Albumin 3 8 g/dL      Total Bilirubin 1 10 mg/dL      eGFR 67 ml/min/1 73sq m     Narrative:       [place] guidelines for Chronic Kidney Disease (CKD):     Stage 1 with normal or high GFR (GFR > 90 mL/min/1 73 square meters)    Stage 2 Mild CKD (GFR = 60-89 mL/min/1 73 square meters)    Stage 3A Moderate CKD (GFR = 45-59 mL/min/1 73 square meters)    Stage 3B Moderate CKD (GFR = 30-44 mL/min/1 73 square meters)    Stage 4 Severe CKD (GFR = 15-29 mL/min/1 73 square meters)    Stage 5 End Stage CKD (GFR <15 mL/min/1 73 square meters)  Note: GFR calculation is accurate only with a steady state creatinine    Magnesium [[id number]]  (Normal) Collected:  12/25/19 1512    Lab Status:  Final result Specimen:  Blood from Arm, Left Updated:  12/25/19 1538     Magnesium 1 9 mg/dL     Lipase [[id number]]  (Abnormal) Collected:  12/25/19 1512    Lab Status:  Final result Specimen:  Blood from Arm, Left Updated:  12/25/19 1538     Lipase 418 u/L     Protime-INR [[id number]]  (Normal) Collected:  12/25/19 1512    Lab Status:  Final result Specimen:  Blood from Arm, Left Updated:  12/25/19 1533     Protime 10 1 seconds      INR 0 94    APTT [[id number]]  (Normal) Collected:  12/25/19 1512    Lab Status:  Final result Specimen:  Blood from Arm, Left Updated:  12/25/19 1533     PTT 30 seconds     CBC and differential [[id number]]  (Abnormal) Collected:  12/25/19 1512    Lab Status:  Final result Specimen:  Blood from Arm, Left Updated:  12/25/19 1517     WBC 6 88 Thousand/uL      RBC 4 89 Million/uL      Hemoglobin 14 3 g/dL      Hematocrit 42 8 %      MCV 88 fL      MCH 29 2 pg      MCHC 33 4 g/dL      RDW 11 8 %      MPV 10 3 fL      Platelets 380 Thousands/uL      nRBC 0 /100 WBCs      Neutrophils Relative 48 %      Immat GRANS % 0 %      Lymphocytes Relative 34 %      Monocytes Relative 15 %      Eosinophils Relative 3 %      Basophils Relative 0 %      Neutrophils Absolute 3 26 Thousands/µL      Immature Grans Absolute 0 02 Thousand/uL      Lymphocytes Absolute 2 35 Thousands/µL      Monocytes Absolute 1 01 Thousand/µL      Eosinophils Absolute 0 21 Thousand/µL      Basophils Absolute 0 03 Thousands/µL                  CTA chest pe study   Final Result by Jian Schmidt MD (12/25 1631)      No acute CT findings  Findings suggest small airways disease such as asthma                    Workstation performed: [id number]                    Procedures  ECG 12 Lead Documentation Only  Date/Time: 12/25/2019 3:04 PM  Performed by: Frankie Schmidt DO  Authorized by: Frankie Schmidt DO     ECG reviewed by me, the ED Provider: yes    Patient location:  ED  Previous ECG:     Previous ECG:  Unavailable    Comparison to cardiac monitor: Yes    Interpretation:     Interpretation: abnormal    Rate:     ECG rate assessment: tachycardic    Rhythm:     Rhythm: sinus rhythm    Ectopy:     Ectopy: none    QRS:     QRS axis:  Normal  Conduction:     Conduction: normal    ST segments:     ST segments:  Non-specific  T waves:     T waves: non-specific               ED Course                               MDM  Number of Diagnoses or Management Options  Chest pain:   Chest wall pain:      Amount and/or Complexity of Data Reviewed  Clinical lab tests: ordered and reviewed  Tests in the radiology section of CPT®: reviewed and ordered  Tests in the medicine section of CPT®: ordered and reviewed    Patient Progress  Patient progress: stable        Disposition  Final diagnoses:   Chest pain   Chest wall pain     Time reflects when diagnosis was documented in both MDM as applicable and the Disposition within this note     Time User Action Codes Description Comment    12/25/2019  5:09 PM Aida Ferrara Add [R07 9] Chest pain     12/25/2019  5:10 PM JessiemamtaAida silva Add [R07 89] Chest wall pain       ED Disposition     ED Disposition Condition Date/Time Comment    Discharge Stable Wed Dec 25, 2019  5:09 PM María Pritchett discharge to home/self care              Follow-up Information     Follow up With Specialties Details Why Contact Info Additional Information    Charlee Stephenson DO Family Medicine, Wound Care Schedule an appointment as soon as possible for a visit   [address] [address] [address] [address]       [address] [facility] Emergency Department Emergency Medicine  If symptoms worsen [address] [facility], [place], [place], [address]          Discharge Medication List as of 12/25/2019  5:10 PM      START taking these medications    Details   naproxen (NAPROSYN) 500 mg tablet Take 1 tablet (500 mg total) by mouth 2 (two) times a day with meals for 5 days, Starting Wed 12/25/2019, Until Mon 12/30/2019, Print         CONTINUE these medications which have NOT CHANGED    Details   Calcium Carbonate-Vitamin D (CALCIUM 600+D HIGH POTENCY PO) Take by mouth daily, Historical Med      Cholecalciferol (VITAMIN D3) 5000 units CAPS Take by mouth daily, Historical Med      famotidine (PEPCID) 40 MG tablet Take 1 tablet by mouth daily as needed, Historical Med      Flaxseed, Linseed, (FLAX SEED OIL) 1000 MG CAPS Take 2 capsules by mouth daily , Historical Med      fluticasone (FLONASE) 50 mcg/act nasal spray 2 sprays into each nostril daily, Starting Sun 12/9/2018, Normal      !! gabapentin (NEURONTIN) 100 mg capsule Take 100 mg by mouth daily, Historical Med      !! gabapentin (NEURONTIN) 100 mg capsule Take 200 mg by mouth daily at bedtime, Historical Med      ibuprofen (MOTRIN) 200 mg tablet Take by mouth every 6 (six) hours as needed for mild pain, Historical Med      meloxicam (MOBIC) 7 5 mg tablet Take 1 tablet (7 5 mg total) by mouth daily, Starting Wed 11/6/2019, Normal       !! - Potential duplicate medications found  Please discuss with provider  No discharge procedures on file      ED Provider  Electronically Signed by           Frankey Forget, DO  12/25/19 7176

## 2019-12-26 ENCOUNTER — VBI (OUTPATIENT)
Dept: FAMILY MEDICINE CLINIC | Facility: CLINIC | Age: 54
End: 2019-12-26

## 2019-12-26 LAB
ATRIAL RATE: 100 BPM
P AXIS: 21 DEGREES
PR INTERVAL: 122 MS
QRS AXIS: 46 DEGREES
QRSD INTERVAL: 70 MS
QT INTERVAL: 348 MS
QTC INTERVAL: 448 MS
T WAVE AXIS: 38 DEGREES
VENTRICULAR RATE: 100 BPM

## 2019-12-26 PROCEDURE — 93010 ELECTROCARDIOGRAM REPORT: CPT | Performed by: INTERNAL MEDICINE

## 2019-12-26 NOTE — TELEPHONE ENCOUNTER
Mirna Pruitt    ED Visit Information     Ed visit date: 12/25/2019  Diagnosis Description:CHEST PAIN  In Network? Yes Sana Leigh  Discharge status: Home  Discharged with meds ? No  Number of ED visits to date: 1  ED Severity:N/A     Outreach Information    Outreach successful: Yes 1  Date letter mailed:NO  Date Finalized:12/26/2019    Care Coordination  DECLINED  Transportation issues ?  No    Value Consolidated James

## 2019-12-27 ENCOUNTER — OFFICE VISIT (OUTPATIENT)
Dept: FAMILY MEDICINE CLINIC | Facility: CLINIC | Age: 54
End: 2019-12-27
Payer: COMMERCIAL

## 2019-12-27 ENCOUNTER — TELEPHONE (OUTPATIENT)
Dept: OTHER | Facility: OTHER | Age: 54
End: 2019-12-27

## 2019-12-27 VITALS
TEMPERATURE: 98.4 F | WEIGHT: 192.2 LBS | RESPIRATION RATE: 16 BRPM | HEART RATE: 86 BPM | OXYGEN SATURATION: 97 % | DIASTOLIC BLOOD PRESSURE: 84 MMHG | SYSTOLIC BLOOD PRESSURE: 120 MMHG | BODY MASS INDEX: 37.73 KG/M2 | HEIGHT: 60 IN

## 2019-12-27 DIAGNOSIS — R09.1 PLEURISY: ICD-10-CM

## 2019-12-27 DIAGNOSIS — I10 ESSENTIAL HYPERTENSION: Primary | ICD-10-CM

## 2019-12-27 DIAGNOSIS — M50.20 BULGING OF CERVICAL INTERVERTEBRAL DISC: ICD-10-CM

## 2019-12-27 DIAGNOSIS — M48.02 SPINAL STENOSIS IN CERVICAL REGION: ICD-10-CM

## 2019-12-27 DIAGNOSIS — J45.909 UNCOMPLICATED ASTHMA, UNSPECIFIED ASTHMA SEVERITY, UNSPECIFIED WHETHER PERSISTENT: ICD-10-CM

## 2019-12-27 PROCEDURE — 3074F SYST BP LT 130 MM HG: CPT | Performed by: FAMILY MEDICINE

## 2019-12-27 PROCEDURE — 3079F DIAST BP 80-89 MM HG: CPT | Performed by: FAMILY MEDICINE

## 2019-12-27 PROCEDURE — 99213 OFFICE O/P EST LOW 20 MIN: CPT | Performed by: FAMILY MEDICINE

## 2019-12-27 PROCEDURE — 3008F BODY MASS INDEX DOCD: CPT | Performed by: FAMILY MEDICINE

## 2019-12-27 RX ORDER — MELOXICAM 7.5 MG/1
7.5 TABLET ORAL DAILY
Qty: 90 TABLET | Refills: 0 | Status: SHIPPED | OUTPATIENT
Start: 2019-12-27 | End: 2020-06-02 | Stop reason: SDUPTHER

## 2019-12-27 RX ORDER — PREDNISONE 20 MG/1
TABLET ORAL
Qty: 32 TABLET | Refills: 0 | Status: SHIPPED | OUTPATIENT
Start: 2019-12-27 | End: 2020-06-02

## 2019-12-27 NOTE — PROGRESS NOTES
Assessment/Plan:    1  Essential hypertension  Assessment & Plan:  Better on recheck      2  Uncomplicated asthma, unspecified asthma severity, unspecified whether persistent  -     predniSONE 20 mg tablet; 4 tabs for three days, 3 tabs for three days, 2 tabs for three days, 1 tab for three days, 1/2 tab for 4 days    3  Pleurisy  -     meloxicam (MOBIC) 7 5 mg tablet; Take 1 tablet (7 5 mg total) by mouth daily    4  Bulging of cervical intervertebral disc  -     meloxicam (MOBIC) 7 5 mg tablet; Take 1 tablet (7 5 mg total) by mouth daily    5  Spinal stenosis in cervical region  -     meloxicam (MOBIC) 7 5 mg tablet; Take 1 tablet (7 5 mg total) by mouth daily          There are no Patient Instructions on file for this visit  Return for Next scheduled follow up  Subjective:      Patient ID: Flower Lan is a 62 y o  female  Chief Complaint   Patient presents with    Follow-up     ER follow up  vfrma    Chest Pain    Shortness of Breath       Pt is here for a same day appy for an ed follow up  Pt states she was in the ed two days ago  States she was having painful breathing - Leonel riose she went to an urgent care - with states inability to take a full breath witjout pain  Had chest x ray and labs and everything was negative  PT was advised to go to the ed for cardiac enzymes  Did not do that then  Next day pt states the chest pain was a little worse - pt states she had testing done chest ct, ekg, coag profile - all as per pt negative  PT states she was told she had plursy - given naprosyn - pt states she cannot tolerate that so was advised to take motrin - which she is not taking as it bother her stomach  Pt states this Am she still has her symptoms  Pt describes feelibng in her chest like she bites on a pepermint and causes that discomfort    She has GERD states it does not feel like that      The following portions of the patient's history were reviewed and updated as appropriate: allergies, current medications, past family history, past medical history, past social history, past surgical history and problem list     Review of Systems   Constitutional: Negative  Negative for activity change, appetite change, chills, diaphoresis and fatigue  HENT: Negative  Negative for dental problem, ear pain, sinus pressure and sore throat  Eyes: Negative  Negative for photophobia, pain, discharge, redness, itching and visual disturbance  Respiratory: Negative for apnea and chest tightness  Pain with breathing   Cardiovascular: Negative  Negative for chest pain, palpitations and leg swelling  Gastrointestinal: Negative  Negative for abdominal distention, abdominal pain, constipation and diarrhea  Endocrine: Negative  Negative for cold intolerance and heat intolerance  Genitourinary: Negative  Negative for difficulty urinating and dyspareunia  Musculoskeletal: Negative  Negative for arthralgias and back pain  Skin: Negative  Allergic/Immunologic: Negative for environmental allergies  Neurological: Negative  Negative for dizziness  Psychiatric/Behavioral: Negative  Negative for agitation           Current Outpatient Medications   Medication Sig Dispense Refill    Calcium Carbonate-Vitamin D (CALCIUM 600+D HIGH POTENCY PO) Take by mouth daily      Cholecalciferol (VITAMIN D3) 5000 units CAPS Take by mouth daily      famotidine (PEPCID) 40 MG tablet Take 1 tablet by mouth daily as needed      Flaxseed, Linseed, (FLAX SEED OIL) 1000 MG CAPS Take 2 capsules by mouth daily       fluticasone (FLONASE) 50 mcg/act nasal spray 2 sprays into each nostril daily 16 g 0    gabapentin (NEURONTIN) 100 mg capsule Take 100 mg by mouth daily      gabapentin (NEURONTIN) 100 mg capsule Take 200 mg by mouth daily at bedtime      ibuprofen (MOTRIN) 200 mg tablet Take by mouth every 6 (six) hours as needed for mild pain      meloxicam (MOBIC) 7 5 mg tablet Take 1 tablet (7 5 mg total) by mouth daily 90 tablet 0    naproxen (NAPROSYN) 500 mg tablet Take 1 tablet (500 mg total) by mouth 2 (two) times a day with meals for 5 days (Patient not taking: Reported on 12/27/2019) 10 tablet 0    predniSONE 20 mg tablet 4 tabs for three days, 3 tabs for three days, 2 tabs for three days, 1 tab for three days, 1/2 tab for 4 days 32 tablet 0     No current facility-administered medications for this visit  Objective:    /84   Pulse 86   Temp 98 4 °F (36 9 °C)   Resp 16   Ht 5' (1 524 m)   Wt 87 2 kg (192 lb 3 2 oz)   SpO2 97%   BMI 37 54 kg/m²        Physical Exam   Constitutional: She appears well-developed and well-nourished  No distress  HENT:   Head: Normocephalic and atraumatic  Right Ear: External ear normal    Left Ear: External ear normal    Nose: Nose normal    Mouth/Throat: Oropharynx is clear and moist  No oropharyngeal exudate  Eyes: Pupils are equal, round, and reactive to light  EOM are normal  Right eye exhibits no discharge  Left eye exhibits no discharge  No scleral icterus  Neck: No thyromegaly present  Cardiovascular: Normal rate and normal heart sounds  No murmur heard  Pulmonary/Chest: Effort normal and breath sounds normal  No accessory muscle usage or stridor  No tachypnea  No respiratory distress  She has no wheezes  Abdominal: Soft  Bowel sounds are normal  She exhibits no distension and no mass  There is no tenderness  There is no rebound and no guarding  Musculoskeletal: Normal range of motion  Neurological: She is alert  She displays normal reflexes  Coordination normal    Skin: Skin is warm and dry  No rash noted  She is not diaphoretic  No erythema  Psychiatric: She has a normal mood and affect  Her behavior is normal    Nursing note and vitals reviewed             Recent Results (from the past 672 hour(s))   ECG 12 lead    Collection Time: 12/25/19  2:55 PM   Result Value Ref Range    Ventricular Rate 100 BPM    Atrial Rate 100 BPM DE Interval 122 ms    QRSD Interval 70 ms    QT Interval 348 ms    QTC Interval 448 ms    P [place] 21 degrees    QRS Axis 46 degrees    T Wave Axis 38 degrees   CBC and differential    Collection Time: 12/25/19  3:12 PM   Result Value Ref Range    WBC 6 88 4 31 - 10 16 Thousand/uL    RBC 4 89 3 81 - 5 12 Million/uL    Hemoglobin 14 3 11 5 - 15 4 g/dL    Hematocrit 42 8 34 8 - 46 1 %    MCV 88 82 - 98 fL    MCH 29 2 26 8 - 34 3 pg    MCHC 33 4 31 4 - 37 4 g/dL    RDW 11 8 11 6 - 15 1 %    MPV 10 3 8 9 - 12 7 fL    Platelets 127 815 - 275 Thousands/uL    nRBC 0 /100 WBCs    Neutrophils Relative 48 43 - 75 %    Immat GRANS % 0 0 - 2 %    Lymphocytes Relative 34 14 - 44 %    Monocytes Relative 15 (H) 4 - 12 %    Eosinophils Relative 3 0 - 6 %    Basophils Relative 0 0 - 1 %    Neutrophils Absolute 3 26 1 85 - 7 62 Thousands/µL    Immature Grans Absolute 0 02 0 00 - 0 20 Thousand/uL    Lymphocytes Absolute 2 35 0 60 - 4 47 Thousands/µL    Monocytes Absolute 1 01 0 17 - 1 22 Thousand/µL    Eosinophils Absolute 0 21 0 00 - 0 61 Thousand/µL    Basophils Absolute 0 03 0 00 - 0 10 Thousands/µL   Protime-INR    Collection Time: 12/25/19  3:12 PM   Result Value Ref Range    Protime 10 1 9 8 - 12 0 seconds    INR 0 94 0 91 - 1 09   APTT    Collection Time: 12/25/19  3:12 PM   Result Value Ref Range    PTT 30 25 - 32 seconds   Comprehensive metabolic panel    Collection Time: 12/25/19  3:12 PM   Result Value Ref Range    Sodium 142 136 - 145 mmol/L    Potassium 3 7 3 5 - 5 3 mmol/L    Chloride 106 100 - 108 mmol/L    CO2 30 21 - 32 mmol/L    ANION GAP 6 4 - 13 mmol/L    BUN 12 5 - 25 mg/dL    Creatinine 0 95 0 60 - 1 30 mg/dL    Glucose 102 65 - 140 mg/dL    Calcium 8 8 8 3 - 10 1 mg/dL    AST 32 5 - 45 U/L    ALT 64 12 - 78 U/L    Alkaline Phosphatase 79 46 - 116 U/L    Total Protein 7 4 6 4 - 8 2 g/dL    Albumin 3 8 3 5 - 5 0 g/dL    Total Bilirubin 1 10 (H) 0 20 - 1 00 mg/dL    eGFR 67 ml/min/1 73sq m   Magnesium Collection Time: 12/25/19  3:12 PM   Result Value Ref Range    Magnesium 1 9 1 6 - 2 6 mg/dL   Lipase    Collection Time: 12/25/19  3:12 PM   Result Value Ref Range    Lipase 418 (H) 73 - 393 u/L   Troponin I    Collection Time: 12/25/19  3:12 PM   Result Value Ref Range    Troponin I <0 02 <=0 04 ng/mL     CTA chest pe study   Status: Final result   PACS Images      Show images for CTA chest pe study   Study Result     CTA - CHEST WITH IV CONTRAST - PULMONARY ANGIOGRAM     INDICATION:   chest pain,pleurisy      COMPARISON: None      TECHNIQUE: CTA examination of the chest was performed using angiographic technique according to a protocol specifically tailored to evaluate for pulmonary embolism  Axial, sagittal, and coronal 2D reformatted images were created from the source data and   submitted for interpretation  In addition, coronal 3D MIP postprocessing was performed on the acquisition scanner        Radiation dose length product (DLP) for this visit:  573 13 mGy-cm   This examination, like all CT scans performed in the [facility], was performed utilizing techniques to minimize radiation dose exposure, including the use of iterative   reconstruction and automated exposure control      IV Contrast:  85 mL of iohexol (OMNIPAQUE)     FINDINGS:     PULMONARY ARTERIAL TREE:  No pulmonary embolus is seen       LUNGS:  Right apical scarring/atelectasis   Left basilar streaky density likely atelectasis      Mosaic attenuation in the lungs suggesting small airways disease      PLEURA:  Unremarkable      HEART/GREAT VESSELS:  Unremarkable for patient's age      MEDIASTINUM AND RADHA:  Unremarkable      CHEST WALL AND LOWER NECK:   Unremarkable      VISUALIZED STRUCTURES IN THE UPPER ABDOMEN:  Hepatic steatosis and hepatomegaly      OSSEOUS STRUCTURES:  No acute fracture or destructive osseous lesion      IMPRESSION:     No acute CT findings      Findings suggest small airways disease such as asthma                  Workstation performed: [id number]         Adele Haywood DO

## 2019-12-27 NOTE — TELEPHONE ENCOUNTER
Malika Loaiza 1965  CONFIDENTIALTY NOTICE: This fax transmission is intended only for the addressee  It contains information that is legally privileged,  confidential or otherwise protected from use or disclosure  If you are not the intended recipient, you are strictly prohibited from reviewing,  disclosing, copying using or disseminating any of this information or taking any action in reliance on or regarding this information  If you have  received this fax in error, please notify us immediately by telephone so that we can arrange for its return to us  Page:   Call Id: [id number]  Health Call  Standard Call Report  Health Call  Patient Name: Malika Loaiza  Gender: Female  : 1965  Age: 62 Y 8 M 23 D  Return Phone  Number: [phone] (Home)  Address: [address] [place] [address]/[facility]/Zip: [address] [place] [address]  Practice Name: [address] Charged:  Physician:  rIena Nielson Name:  Relationship To  Patient: Self  Return Phone Number: [phone] (Home)  Presenting Problem: "I was just seen in the ED and I do not  remember care instruction and I really  want to talk to a nurse regarding my  condition "  Service Type: Triage  Charged Service 1: [address]  38  Name and  Number:  Nurse Assessment  Protocols  Protocol Title Nurse Date/Time  Disp  Time Disposition Final User  2019 7:58:21 AM RN Triaged Yes Leoncio Calix  Comments  User: Sheba Montes De Oca RN Date/Time: 2019 7:58:15 AM  The patient verbalizes confusion over her Dx from the ER  She feels she was not given enough information since she does have  a HX of having had Active TB and lung scarring from same  She has the CT Scan report and states it has "atelectasis" noted  She  did not receive any type of breathing exercises  We discussed scheduling her appointment and that Dr Villatoro Officer will be better at  pulling the pieces together for her to see what needs ro be done

## 2019-12-31 ENCOUNTER — CONSULT (OUTPATIENT)
Dept: NEUROLOGY | Facility: CLINIC | Age: 54
End: 2019-12-31
Payer: COMMERCIAL

## 2019-12-31 ENCOUNTER — TELEPHONE (OUTPATIENT)
Dept: FAMILY MEDICINE CLINIC | Facility: CLINIC | Age: 54
End: 2019-12-31

## 2019-12-31 VITALS
BODY MASS INDEX: 37.11 KG/M2 | DIASTOLIC BLOOD PRESSURE: 102 MMHG | HEIGHT: 60 IN | SYSTOLIC BLOOD PRESSURE: 142 MMHG | WEIGHT: 189 LBS | HEART RATE: 67 BPM

## 2019-12-31 DIAGNOSIS — G89.4 CHRONIC PAIN SYNDROME: ICD-10-CM

## 2019-12-31 DIAGNOSIS — M48.02 SPINAL STENOSIS IN CERVICAL REGION: ICD-10-CM

## 2019-12-31 DIAGNOSIS — F10.11 HISTORY OF ALCOHOL ABUSE: ICD-10-CM

## 2019-12-31 DIAGNOSIS — M50.022 CERVICAL DISC DISORDER AT C5-C6 LEVEL WITH MYELOPATHY: Primary | ICD-10-CM

## 2019-12-31 DIAGNOSIS — F32.A MILD DEPRESSION: ICD-10-CM

## 2019-12-31 DIAGNOSIS — I10 ESSENTIAL HYPERTENSION: ICD-10-CM

## 2019-12-31 PROCEDURE — 99244 OFF/OP CNSLTJ NEW/EST MOD 40: CPT | Performed by: PSYCHIATRY & NEUROLOGY

## 2019-12-31 NOTE — PROGRESS NOTES
Outpatient Neurology History and Physical  Santana Chahal  [id number]  66 y o   1965          Consult: Yes    Mis Keen DO      Chief Complaint   Patient presents with    Neurologic Problem     Spinal Stenosis-NP-Dr Jarad Montoya           History Obtained from: Patient     HPI:     Ms Stan Burt is a 61 yo F with PMH of lower back pain presents to [facility] care  3-4 years ago patient had sudden onset bilateral T5-6 distribution back and rib cage pain  She was following at [facility] and then in [facility] neurology  She has had MRI brain which showed chronic small vessel disease vs white matter lesions that could be demyelinating  MRI cervical spine revealed C5-6 disc bulge with encroachment and flattening of ventral margin of cord accompanying endplate discogenic changes and disc space narrowing  She's on gabapentin 300mg daily which tends to control her pain  Of note, day her pain started she had to go identify her brother who committed suicide  She goes to psychotherapy once a month  She has tried elavil and pamelor previously but it was keeping her awake at night  She has tried neck PT in past which has helped  She does report neck pain radiating down to forearm  She has PT scheduled for LE and lower back  Her LS spine MRI was normal    She's currently on prednisone 20mg for pleurisy       Past Medical History:   Diagnosis Date    Abscess of back     Resolved 1/11/2018     Adverse reaction to statin medication     Resolved 1/11/2018     Alcoholism ([address] )     AGE 27    Anesthesia complication     extreme dizziness upon awakening    Anxiety     Chronic pain disorder      rt side pinched nerve    Depression     Edema     GERD (gastroesophageal reflux disease)     Hyperlipidemia     Hypertension     Irregular heart beat     palpitations    Irritable bowel syndrome     Liver disease     fatty    Neck pain     Pinched nerve     Last assessed 9/26/2017     PONV (postoperative nausea and vomiting)     Tuberculosis     1986               Current Outpatient Medications on File Prior to Visit   Medication Sig Dispense Refill    Calcium Carbonate-Vitamin D (CALCIUM 600+D HIGH POTENCY PO) Take by mouth daily      Cholecalciferol (VITAMIN D3) 5000 units CAPS Take by mouth daily      famotidine (PEPCID) 40 MG tablet Take 1 tablet by mouth daily as needed      Flaxseed, Linseed, (FLAX SEED OIL) 1000 MG CAPS Take 2 capsules by mouth daily       fluticasone (FLONASE) 50 mcg/act nasal spray 2 sprays into each nostril daily (Patient taking differently: 2 sprays into each nostril daily as needed ) 16 g 0    gabapentin (NEURONTIN) 100 mg capsule Take 100 mg by mouth daily      gabapentin (NEURONTIN) 100 mg capsule Take 200 mg by mouth daily at bedtime      ibuprofen (MOTRIN) 200 mg tablet Take by mouth every 6 (six) hours as needed for mild pain      meloxicam (MOBIC) 7 5 mg tablet Take 1 tablet (7 5 mg total) by mouth daily 90 tablet 0    predniSONE 20 mg tablet 4 tabs for three days, 3 tabs for three days, 2 tabs for three days, 1 tab for three days, 1/2 tab for 4 days 32 tablet 0    [DISCONTINUED] naproxen (NAPROSYN) 500 mg tablet Take 1 tablet (500 mg total) by mouth 2 (two) times a day with meals for 5 days (Patient not taking: Reported on 12/27/2019) 10 tablet 0     No current facility-administered medications on file prior to visit          No Known Allergies      Family History   Problem Relation Age of Onset    Alzheimer's disease Mother     Thyroid disease Mother     Edema Mother     Osteoarthritis Mother         Knee    Stroke Father     Hypertension Father     Hyperlipidemia Father     Arthritis Father     Coronary artery disease Father     Hypothyroidism Father     Other Father         status post coronary artery bypass graft     Stroke Sister     Heart failure Sister     Heart disease Sister     Drug abuse Brother     Completed Suicide  Brother Past Surgical History:   Procedure Laterality Date    CARDIAC CATHETERIZATION      due to brothers advanced heart disease and abnormal stress test    COLONOSCOPY N/A 2017    Procedure: COLONOSCOPY;  Surgeon: Russ Santos MD;  Location: [place] [address] GI LAB; Service: Gastroenterology    COLONOSCOPY  12    ELBOW SURGERY Right     tendon repair, post op nausea and dizziness    ESOPHAGOGASTRODUODENOSCOPY N/A 2017    Procedure: ESOPHAGOGASTRODUODENOSCOPY (EGD); Surgeon: Russ Santos MD;  Location: [facility];   Service: Gastroenterology    ESOPHAGOGASTRODUODENOSCOPY      Diagnostic     SKIN BIOPSY      Last assessed 2017     WISDOM TOOTH EXTRACTION             Social History     Socioeconomic History    Marital status: Single     Spouse name: Not on file    Number of children: Not on file    Years of education: Not on file    Highest education level: Not on file   Occupational History    Not on file   Social Needs    Financial resource strain: Not on file    Food insecurity:     Worry: Not on file     Inability: Not on file    Transportation needs:     Medical: Not on file     Non-medical: Not on file   Tobacco Use    Smoking status: Former Smoker     Packs/day: 1 00     Years: 9 00     Pack years: 9 00     Last attempt to quit:      Years since quittin 0    Smokeless tobacco: Never Used   Substance and Sexual Activity    Alcohol use: No     Comment: 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    Drug use: No    Sexual activity: Not Currently   Lifestyle    Physical activity:     Days per week: Not on file     Minutes per session: Not on file    Stress: Not on file   Relationships    Social connections:     Talks on phone: Not on file     Gets together: Not on file     Attends Cheondo[employer] service: Not on file     Active member of club or organization: Not on file     Attends meetings of clubs or organizations: Not on file     Relationship status: Not on file    Intimate partner violence:     Fear of current or ex partner: Not on file     Emotionally abused: Not on file     Physically abused: Not on file     Forced sexual activity: Not on file   Other Topics Concern    Not on file   Social History Narrative    Not on file       Review of Systems  Refer to positive review of systems in HPI  Constitutional- No fever  Eyes- No visual change  ENT- Hearing normal  CV- No chest pain  Resp- No Shortness of breath  GI- No diarrhea  - Bladder normal  MS- No Arthritis   Skin- No rash  Psych- No depression  Endo- No DM  Heme- No nodes    PHYSICAL EXAM:    Vitals:    12/31/19 1047   BP: (!) 142/102   BP Location: Left arm   Patient Position: Sitting   Cuff Size: Adult   Pulse: 67   Weight: 85 7 kg (189 lb)   Height: 5' (1 524 m)         Appearance: No Acute Distress  Ophthalmoscopic: Disc Flat, Normal fundus  Carotid/Heart/Peripheral Vascular: No Bruits, RRR  Orientation: Awake, Alert, and Oriented x 3  Mental status:  Memory: Registation 3/3 Recall 3/3  Attention: Normal  Knowledge: Appropriate  Language: No aphasia  Speech: No dysarthria  Cranial Nerves:  2 No Visual Defect on Confrontation; Pupils round, equal, reactive to light  3,4,6 Extraocular Movements Intact; no nystagmus  5 Facial Sensation Intact  7 No facial asymmetry  8 Intact hearing  9,10 Palate symmetric, normal gag  11 Good shoulder shrug  12 Tongue Midline  Gait: Stable, No ataxia, can perform tandem walking  Coordination: No ataxia with finger to nose testing and heel to shin testing  Sensory: hyperesthesia in b/l feet   Intact  Vibration, and Joint Position  Muscle Tone: Normal  Muscle exam  Arm Right Left Leg Right Left   Deltoid 5/5 5/5 Iliopsoas 5/5 5/5   Biceps 5/5 5/5 Quads 5/5 5/5   Triceps 5/5 5/5 Hamstrings 5/5 5/5   Wrist Extension 5/5 5/5 Ankle Dorsi Flexion 5/5 5/5   Wrist Flexion 5/5 5/5 Ankle Plantar Flexion 5/5 5/5   Interossei 5/5 5/5 Ankle Eversion 5/5 5/5   APB 5/5 5/5 Ankle Inversion 5/5 5/5 Reflexes   RJ BJ TJ KJ AJ Plantars Elizabeth's   Right 2+ 2+ 2+ 2+ 1+ Downgoing Not present   Left 2+ 2+ 2+ 2+ 1+ Downgoing Not present         Personal review of  MRI cervical, LS spine  MRI brain    Assessment/Plan:     1  Cervical disc disorder at C5-C6 level with myelopathy     2  Spinal stenosis in cervical region  Ambulatory referral to Neurology   3  Chronic pain syndrome     4  History of alcohol abuse     5  Mild depression ([facility] [address] )         I do not appreciate any clinical signs of MS  Discussed that her MRI c spine finding from past are concerning however her symptoms are rather mild and she wouldn't want any intervention unless symptoms became prominent  Will repeat imaging in future if needed  She may resume gabapentin 100mg am and 200mg pm    Recommended looking into blue light device that has shown to help with depression and overall health being  Discussed elevated BP today  It could be from prednisone  Asked her to periodically check  Addressed all her concerns  Counseling Documentation:  The patient and/or patient's family were  counseled regarding diagnostic results  Instructions for management,risk factor reductions,prognosis of disease were discussed  Patient and family were educated regarding impressions,risks and benefits of treatment options,importance of compliance with treatment  Total time of encounter: 50 min  More than 50% of time was spent in counseling and coordination of care of patient  THOMAS Montes    [facility]  [phone]  [address]

## 2019-12-31 NOTE — TELEPHONE ENCOUNTER
Pt dropped off paper stating she got her flu shot  pls add to her immunizations  Paper was already scanned into her chart  Placed in nurse pending

## 2020-06-02 ENCOUNTER — OFFICE VISIT (OUTPATIENT)
Dept: FAMILY MEDICINE CLINIC | Facility: CLINIC | Age: 55
End: 2020-06-02
Payer: COMMERCIAL

## 2020-06-02 ENCOUNTER — TELEPHONE (OUTPATIENT)
Dept: FAMILY MEDICINE CLINIC | Facility: CLINIC | Age: 55
End: 2020-06-02

## 2020-06-02 VITALS
OXYGEN SATURATION: 98 % | WEIGHT: 189 LBS | HEIGHT: 60 IN | HEART RATE: 90 BPM | BODY MASS INDEX: 37.11 KG/M2 | SYSTOLIC BLOOD PRESSURE: 136 MMHG | RESPIRATION RATE: 18 BRPM | DIASTOLIC BLOOD PRESSURE: 82 MMHG | TEMPERATURE: 98 F

## 2020-06-02 DIAGNOSIS — E78.2 HYPERLIPEMIA, MIXED: ICD-10-CM

## 2020-06-02 DIAGNOSIS — Z13.6 SCREENING FOR CARDIOVASCULAR CONDITION: ICD-10-CM

## 2020-06-02 DIAGNOSIS — M50.20 BULGING OF CERVICAL INTERVERTEBRAL DISC: ICD-10-CM

## 2020-06-02 DIAGNOSIS — M48.02 SPINAL STENOSIS IN CERVICAL REGION: ICD-10-CM

## 2020-06-02 DIAGNOSIS — I10 ESSENTIAL HYPERTENSION: Primary | ICD-10-CM

## 2020-06-02 DIAGNOSIS — E66.01 SEVERE OBESITY (BMI 35.0-39.9) WITH COMORBIDITY (HCC): ICD-10-CM

## 2020-06-02 DIAGNOSIS — R09.1 PLEURISY: ICD-10-CM

## 2020-06-02 PROCEDURE — 3079F DIAST BP 80-89 MM HG: CPT | Performed by: FAMILY MEDICINE

## 2020-06-02 PROCEDURE — 1036F TOBACCO NON-USER: CPT | Performed by: FAMILY MEDICINE

## 2020-06-02 PROCEDURE — 3008F BODY MASS INDEX DOCD: CPT | Performed by: FAMILY MEDICINE

## 2020-06-02 PROCEDURE — 3075F SYST BP GE 130 - 139MM HG: CPT | Performed by: FAMILY MEDICINE

## 2020-06-02 PROCEDURE — 99214 OFFICE O/P EST MOD 30 MIN: CPT | Performed by: FAMILY MEDICINE

## 2020-06-02 RX ORDER — LOSARTAN POTASSIUM AND HYDROCHLOROTHIAZIDE 12.5; 5 MG/1; MG/1
1 TABLET ORAL DAILY
Qty: 90 TABLET | Refills: 1 | Status: SHIPPED | OUTPATIENT
Start: 2020-06-02 | End: 2021-02-12 | Stop reason: SDUPTHER

## 2020-06-02 RX ORDER — MELOXICAM 7.5 MG/1
7.5 TABLET ORAL DAILY
Qty: 90 TABLET | Refills: 0 | Status: SHIPPED | OUTPATIENT
Start: 2020-06-02 | End: 2020-08-21

## 2020-06-10 ENCOUNTER — ANNUAL EXAM (OUTPATIENT)
Dept: OBGYN CLINIC | Facility: CLINIC | Age: 55
End: 2020-06-10
Payer: COMMERCIAL

## 2020-06-10 VITALS
BODY MASS INDEX: 37.11 KG/M2 | SYSTOLIC BLOOD PRESSURE: 116 MMHG | WEIGHT: 190 LBS | DIASTOLIC BLOOD PRESSURE: 76 MMHG | TEMPERATURE: 98.6 F

## 2020-06-10 DIAGNOSIS — Z12.39 SCREENING FOR MALIGNANT NEOPLASM OF BREAST: ICD-10-CM

## 2020-06-10 DIAGNOSIS — Z01.419 WELL WOMAN EXAM WITH ROUTINE GYNECOLOGICAL EXAM: Primary | ICD-10-CM

## 2020-06-10 DIAGNOSIS — R92.2 DENSE BREAST TISSUE: ICD-10-CM

## 2020-06-10 PROCEDURE — 99396 PREV VISIT EST AGE 40-64: CPT | Performed by: OBSTETRICS & GYNECOLOGY

## 2020-06-26 ENCOUNTER — OFFICE VISIT (OUTPATIENT)
Dept: FAMILY MEDICINE CLINIC | Facility: CLINIC | Age: 55
End: 2020-06-26
Payer: COMMERCIAL

## 2020-06-26 VITALS
DIASTOLIC BLOOD PRESSURE: 68 MMHG | HEART RATE: 74 BPM | WEIGHT: 189.6 LBS | RESPIRATION RATE: 18 BRPM | TEMPERATURE: 97.8 F | HEIGHT: 60 IN | SYSTOLIC BLOOD PRESSURE: 130 MMHG | BODY MASS INDEX: 37.22 KG/M2

## 2020-06-26 DIAGNOSIS — M54.6 ACUTE RIGHT-SIDED THORACIC BACK PAIN: Primary | ICD-10-CM

## 2020-06-26 PROCEDURE — 3075F SYST BP GE 130 - 139MM HG: CPT | Performed by: NURSE PRACTITIONER

## 2020-06-26 PROCEDURE — 3008F BODY MASS INDEX DOCD: CPT | Performed by: NURSE PRACTITIONER

## 2020-06-26 PROCEDURE — 3078F DIAST BP <80 MM HG: CPT | Performed by: NURSE PRACTITIONER

## 2020-06-26 PROCEDURE — 1036F TOBACCO NON-USER: CPT | Performed by: NURSE PRACTITIONER

## 2020-06-26 PROCEDURE — 99213 OFFICE O/P EST LOW 20 MIN: CPT | Performed by: NURSE PRACTITIONER

## 2020-06-26 RX ORDER — METHYLPREDNISOLONE 4 MG/1
TABLET ORAL
Qty: 1 EACH | Refills: 0 | Status: SHIPPED | OUTPATIENT
Start: 2020-06-26 | End: 2020-07-02

## 2020-06-30 ENCOUNTER — OFFICE VISIT (OUTPATIENT)
Dept: NEUROLOGY | Facility: CLINIC | Age: 55
End: 2020-06-30
Payer: COMMERCIAL

## 2020-06-30 VITALS
SYSTOLIC BLOOD PRESSURE: 146 MMHG | HEIGHT: 60 IN | TEMPERATURE: 98.4 F | DIASTOLIC BLOOD PRESSURE: 83 MMHG | BODY MASS INDEX: 37.11 KG/M2 | WEIGHT: 189 LBS | HEART RATE: 76 BPM

## 2020-06-30 DIAGNOSIS — R07.81 PAIN IN RIB: ICD-10-CM

## 2020-06-30 DIAGNOSIS — M50.122 CERVICAL DISC DISORDER AT C5-C6 LEVEL WITH RADICULOPATHY: Primary | ICD-10-CM

## 2020-06-30 DIAGNOSIS — F10.11 HISTORY OF ALCOHOL ABUSE: ICD-10-CM

## 2020-06-30 DIAGNOSIS — G89.4 CHRONIC PAIN SYNDROME: ICD-10-CM

## 2020-06-30 PROCEDURE — 3077F SYST BP >= 140 MM HG: CPT | Performed by: PSYCHIATRY & NEUROLOGY

## 2020-06-30 PROCEDURE — 99214 OFFICE O/P EST MOD 30 MIN: CPT | Performed by: PSYCHIATRY & NEUROLOGY

## 2020-06-30 PROCEDURE — 3079F DIAST BP 80-89 MM HG: CPT | Performed by: PSYCHIATRY & NEUROLOGY

## 2020-06-30 PROCEDURE — 3008F BODY MASS INDEX DOCD: CPT | Performed by: PSYCHIATRY & NEUROLOGY

## 2020-06-30 PROCEDURE — 1036F TOBACCO NON-USER: CPT | Performed by: PSYCHIATRY & NEUROLOGY

## 2020-06-30 RX ORDER — GABAPENTIN 300 MG/1
300 CAPSULE ORAL 2 TIMES DAILY PRN
Qty: 60 CAPSULE | Refills: 2 | Status: SHIPPED | OUTPATIENT
Start: 2020-06-30 | End: 2020-11-03 | Stop reason: SDUPTHER

## 2020-08-12 ENCOUNTER — APPOINTMENT (OUTPATIENT)
Dept: RADIOLOGY | Facility: OTHER | Age: 55
End: 2020-08-12
Payer: COMMERCIAL

## 2020-08-12 ENCOUNTER — OFFICE VISIT (OUTPATIENT)
Dept: OBGYN CLINIC | Facility: OTHER | Age: 55
End: 2020-08-12
Payer: COMMERCIAL

## 2020-08-12 VITALS
HEIGHT: 61 IN | HEART RATE: 91 BPM | DIASTOLIC BLOOD PRESSURE: 99 MMHG | SYSTOLIC BLOOD PRESSURE: 164 MMHG | BODY MASS INDEX: 35.5 KG/M2 | TEMPERATURE: 97.7 F | WEIGHT: 188 LBS

## 2020-08-12 DIAGNOSIS — M25.511 RIGHT SHOULDER PAIN, UNSPECIFIED CHRONICITY: ICD-10-CM

## 2020-08-12 DIAGNOSIS — M75.101 ROTATOR CUFF SYNDROME, RIGHT: ICD-10-CM

## 2020-08-12 DIAGNOSIS — M75.101 ROTATOR CUFF SYNDROME, RIGHT: Primary | ICD-10-CM

## 2020-08-12 PROCEDURE — 3077F SYST BP >= 140 MM HG: CPT | Performed by: ORTHOPAEDIC SURGERY

## 2020-08-12 PROCEDURE — 3080F DIAST BP >= 90 MM HG: CPT | Performed by: ORTHOPAEDIC SURGERY

## 2020-08-12 PROCEDURE — 73030 X-RAY EXAM OF SHOULDER: CPT

## 2020-08-12 PROCEDURE — 3008F BODY MASS INDEX DOCD: CPT | Performed by: ORTHOPAEDIC SURGERY

## 2020-08-12 PROCEDURE — 1036F TOBACCO NON-USER: CPT | Performed by: ORTHOPAEDIC SURGERY

## 2020-08-12 PROCEDURE — 99214 OFFICE O/P EST MOD 30 MIN: CPT | Performed by: ORTHOPAEDIC SURGERY

## 2020-08-12 NOTE — PROGRESS NOTES
Assessment:       1  Rotator cuff syndrome, right    2  Right shoulder pain, unspecified chronicity          Plan:        I explained my current clinical findings and reviewed radiological findings with Toby Soler today  I suspect that her symptoms of the right shoulder are likely secondary to a mild impingement/rotator cuff syndrome  We discussed management options including activity modification and a trial of physical therapy  I did offer her a subacromial cortisone injection but she would like to defer this at this time  I will see her back after about 10 weeks for reassessment  If she still persists with significant shoulder discomfort we may consider doing a subacromial cortisone injection  In the interim I have advised her to follow-up with pain management next week as scheduled  Subjective:     Patient ID: Nancy Rocha is a 60 y o  female  Chief Complaint:  Right shoulder pain    HPI  Toby Soler is a 64-year-old lady who is here today for evaluation of right shoulder pain which has been present intermittently for several years but got worse this morning  However at the time of the office visit she says that her shoulder pain feels somewhat better  Describes this as an aching sensation mostly on the lateral aspect of the shoulder with some radiation down the right lateral arm  Made worse with reaching behind her back or overhead activities  Denies any history of preceding trauma recently prior to the symptom worsening  She does have a history of cervical spinal stenosis for which she is due to see pain management next week  She also takes oral gabapentin and has been taking meloxicam for her symptoms       Social History     Occupational History    Not on file   Tobacco Use    Smoking status: Former Smoker     Packs/day:      Years: 9      Pack years: 9      Last attempt to quit:      Years since quittin 6    Smokeless tobacco: Never Used   Substance and Sexual Activity  Alcohol use: No     Comment: 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    Drug use: No    Sexual activity: Not Currently      Review of Systems   Constitutional: Negative  HENT: Negative  Eyes: Negative  Respiratory: Negative  Cardiovascular: Negative  Gastrointestinal: Negative  Endocrine: Negative  Genitourinary: Negative  Skin: Negative  Allergic/Immunologic: Negative  Neurological: Negative  Hematological: Negative  Psychiatric/Behavioral: Negative  Objective:     Ortho ExamPhysical Exam  Vitals signs and nursing note reviewed  Constitutional:       Appearance: She is well-developed  HENT:      Head: Normocephalic and atraumatic  Eyes:      Conjunctiva/sclera: Conjunctivae normal       Pupils: Pupils are equal, round, and reactive to light  Cardiovascular:      Rate and Rhythm: Normal rate and regular rhythm  Pulmonary:      Effort: Pulmonary effort is normal  No respiratory distress  Skin:     General: Skin is warm and dry  Findings: No erythema  Neurological:      Mental Status: She is alert and oriented to person, place, and time  Cranial Nerves: No cranial nerve deficit  Psychiatric:         Behavior: Behavior normal          Thought Content: Thought content normal          Judgment: Judgment normal          Right shoulder exam:  Mild tenderness to palpation over the supraspinatus and the subacromial region  No other areas of significant right shoulder girdle tenderness noted  There is good range of motion of the right shoulder in forward flexion and abduction which are up to 170°, internal rotation is limited to L2-L3 level  External rotation in adduction is 80°, normal cross adduction  Equivocal Gr and equivocal Neer's  Equivocal empty can test   Negative Sarpy's  Negative acromioclavicular Jobes compression test   Rotator cuff strength is subscapularis 4+/5, supraspinatus 4+/5, infraspinatus and teres minor 5/5    No anterior apprehension  Cervical spine evaluation reveals some tenderness at the C7-T1 area the right lower paraspinal area  Negative Spurling's maneuver  Clinically intact neurological status of bilateral upper extremities grossly  I have personally reviewed pertinent films in PACS and my interpretation is As noted below

## 2020-08-19 ENCOUNTER — EVALUATION (OUTPATIENT)
Dept: PHYSICAL THERAPY | Facility: OTHER | Age: 55
End: 2020-08-19
Payer: COMMERCIAL

## 2020-08-19 DIAGNOSIS — M75.101 ROTATOR CUFF SYNDROME, RIGHT: ICD-10-CM

## 2020-08-19 DIAGNOSIS — M54.12 CERVICAL RADICULOPATHY: Primary | ICD-10-CM

## 2020-08-19 PROCEDURE — 97110 THERAPEUTIC EXERCISES: CPT | Performed by: PHYSICAL THERAPIST

## 2020-08-19 PROCEDURE — 97163 PT EVAL HIGH COMPLEX 45 MIN: CPT | Performed by: PHYSICAL THERAPIST

## 2020-08-19 NOTE — PROGRESS NOTES
PT Evaluation     Today's date: 2020  Patient name: Alina Reid  : 1965  MRN: [id number]  Referring provider: Zackary Oconnor MD  Dx:   Encounter Diagnosis     ICD-10-CM    1  Cervical radiculopathy  M54 12    2  Rotator cuff syndrome, right  M75 101 Ambulatory referral to Physical Therapy       Start Time: 9560  Stop Time: 1700  Total time in clinic (min): 55 minutes    Assessment  Assessment details: Alina Reid is a pleasant 60 y o  female who presents with R sided shoulder pain and referred R upper extremity secondary to shoulder impingement, RT tendinitis, and cervical radiculopathy  The patient's greatest concerns are worry over not knowing what's wrong, concern at no signs of improvement, wanting to avoid surgery, fear of not being able to keep active and future ill health (and wanting to prevent it)  No further referral appears necessary at this time based upon examination results  Primary movement impairment diagnosis of R arm pain with movement coordination impairments limiting her ability to care for self, carry, exercise or recreation, reach overhead and turn to look over shoulder  Primary Impairments:  1) decreased cervical ROM  2) decreased thoracic spine mobility  3 poor scap strength and dynamic control of RTC    Etiologic factors include none recalled by the patient  Impairments: abnormal coordination, abnormal muscle firing, abnormal muscle tone, abnormal or restricted ROM, abnormal movement, activity intolerance, difficulty understanding, impaired physical strength, lacks appropriate home exercise program, pain with function, poor posture  and poor body mechanics    Symptom irritability: moderateUnderstanding of Dx/Px/POC: good   Prognosis: good  Prognosis details: Positive prognostic indicators include positive attitude toward recovery  Negative prognostic indicators include chronicity of symptoms, anxiety, hypertension, high symptom irritability and obesity  Goals  STG's to be achieved in 4 weeks:  1) Patient will have normal pain free AROM of cervical spine and R shoulder  2) Patient will improve scap strength by 1/2 muscle grade  3) Patient will report no greater than 2/10 shoulder pain with elevation of R shoulder overhead  LTG's to be achieved in 8 weeks:  1) Patient will be independent and compliant with HEP  2) Patient will report being able to sleep through the night without waking due to pain in R shoulder or upper extremity referred pain from neck  3) Patient will score 65 or greater on FOTO  Plan  Patient would benefit from: skilled physical therapy  Planned modality interventions: thermotherapy: hydrocollator packs  Planned therapy interventions: activity modification, joint mobilization, manual therapy, motor coordination training, neuromuscular re-education, patient education, self care, therapeutic activities, therapeutic exercise, graded activity, home exercise program and behavior modification  Frequency: 2x week  Treatment plan discussed with: patient        Subjective Evaluation    History of Present Illness  Date of onset: 8/12/2020  Mechanism of injury: Patient reports chronic neck pain  States she has recently had an increase in R shoulder pain  She feels her pain is related to nerve pain in her arm  States this morning she woke up and is having difficulty holding objects such as a coffee cup  States this did improve a little as the day went on  States her R hand continues to be numb and that she is very concerned about this  She denies any gait abnormalities, double vision, difficulty swallowing, dizziness  Reports she has a follow up with pain management on Friday  Patient reports she has also been referred neurosurgeon but states she is "horrified to think about having surgery on her neck"  Patient states that she recently moved which may have exacerbated her current symptoms   States 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 which she thinks may also be contributing to her symptoms  Reports waking several times throughout the night due to neurologic symptoms in her R UE and R shoulder pain  Pain  Current pain rating: 3  At best pain rating: 3  At worst pain rating: 10    Patient Goals  Patient goals for therapy: increased strength, decreased pain and return to sport/leisure activities          Objective     Active Range of Motion   Left Shoulder   Normal active range of motion  External rotation BTH: T1   Internal rotation BTB: lumbar     Right Shoulder   Normal active range of motion  Flexion: with pain  Abduction: with pain  External rotation BTH: C7   Internal rotation BTB: sacrum with pain    Strength/Myotome Testing     Left Shoulder     Planes of Motion   Flexion: 5   Extension: 5   Abduction: 5   External rotation at 0°: 5   Internal rotation at 0°: 5     Isolated Muscles   Lower trapezius: 3+   Middle trapezius: 3+   Rhomboids: 3+   Serratus anterior: 3+     Right Shoulder     Planes of Motion   Flexion: 5   Extension: 5   Abduction: 5   Adduction: 5   External rotation at 0°: 4+   Internal rotation at 0°: 4+     Isolated Muscles   Lower trapezius: 3+   Middle trapezius: 3+   Rhomboids: 3+   Serratus anterior: 3+     Tests     Right Shoulder   Positive crossover, Hawkin's, Neer's, painful arc, ULTT1, ULTT2 and ULTT3  General Comments:      Cervical/Thoracic Comments  ROM: extension-  25%hensley, flexion-  25%hensley, R SB-   25%hensley, L SB-  25%hensley, R rot-   25%hensley, L rot-   25%hensley  Joint Mobility: hypomobile C6-7, C1-2  Palpation: TTP and hypertonic upper trap, levator, rhomboids  Dermatomes:  WNL  Myotomes:  WNL  Reflexes: C5- (2+), C6-(2+), C7- (2+)  Special Tests: Vertebral Artery- (-), Sharp-Pursar- (-), Alar Ligament- (-), Spurling's- (+), ULNT Median- (+), ULNT Radial- (+), ULNT Ulnar (+), Compression/Distraction (+/-)    Strength 40# on R, 70# on L, R hand dominant        Flowsheet Rows      Most Recent Value   PT/OT G-Codes   Current Score  70   Projected Score  71             Precautions: chronic neck, shoulder pain, cervical disc herniation and stenosis      Manuals 8/19            ISTM UT             BRUNO mast                                       Neuro Re-Ed             Cervical retraction 10 x            scap squeeze 3 x 10 5"            TB LPD/row             SL ER             AROM of shoulder with scap squeeze                                       Ther Ex             ube             Upper trap stretch 10 x 10"            pec stretch 4 x 30"                                                                             Ther Activity                                       Gait Training                                       Modalities

## 2020-08-21 ENCOUNTER — CONSULT (OUTPATIENT)
Dept: PAIN MEDICINE | Facility: CLINIC | Age: 55
End: 2020-08-21
Payer: COMMERCIAL

## 2020-08-21 VITALS — HEIGHT: 61 IN | WEIGHT: 190 LBS | TEMPERATURE: 98.3 F | BODY MASS INDEX: 35.87 KG/M2

## 2020-08-21 DIAGNOSIS — M50.30 DDD (DEGENERATIVE DISC DISEASE), CERVICAL: ICD-10-CM

## 2020-08-21 DIAGNOSIS — M54.12 CERVICAL RADICULOPATHY: Primary | ICD-10-CM

## 2020-08-21 PROCEDURE — 99244 OFF/OP CNSLTJ NEW/EST MOD 40: CPT | Performed by: ANESTHESIOLOGY

## 2020-08-21 NOTE — PROGRESS NOTES
Assessment  1  Cervical radiculopathy    2  DDD (degenerative disc disease), cervical        Plan  38-year-old female with a history of alcoholism and substance abuse, referred by Dr Carlos Oropeza, presenting for initial consultation regarding a 4 year history of neck pain that radiates into bilateral upper extremities with associated numbness, paresthesias, and subjective weakness worse on the right than left  She denies any trauma or inciting event  MRI of cervical spine from 2018 reveals disc bulge at C5-6 with flattening of the ventral margin of the cord  X-ray of the right shoulder reveals mild osteoarthritis at the [facility] joint  The patient has done physical therapy with moderate transient relief  She does take gabapentin 300 mg p r n  Which is prescribed by Neurology  She does get mild relief with this  She does take ibuprofen p r n  With mild relief  Meloxicam has been ineffective  She has previously tried muscle relaxants without much relief  She has tried Nortriptyline and Amitriptyline in the past without relief  The patient's neck pain does have a myofascial component  Her upper extremity symptoms may be radicular in nature although reflexes and strength were preserved  No evidence of impingement syndrome of the shoulders on physical exam   No evidence of carpal tunnel or cubital tunnel syndrome on physical exam   However, these diagnoses are on the differential     1  I will schedule the patient for C6-7 cervical epidural steroid injection to reduce the inflammatory component of her pain  2  The patient will continue with gabapentin as prescribed by Neurology and I did explain to the patient that if she takes the medication more consistently to obtain a more consistent plasma Level, her symptoms may be better controlled  Patient verbalized understanding  3  Patient will continue with ibuprofen p r n   Should not exceed more than 800 mg q 8 hours p r n   4  Patient will continue with her home exercise program  5  I will follow up the patient in 4 weeks     Complete risks and benefits including bleeding, infection, tissue reaction, nerve injury and allergic reaction were discussed  The approach was demonstrated using models and literature was provided  Verbal and written consent was obtained  My impressions and treatment recommendations were discussed in detail with the patient who verbalized understanding and had no further questions  Discharge instructions were provided  I personally saw and examined the patient and I agree with the above discussed plan of care  No orders of the defined types were placed in this encounter  No orders of the defined types were placed in this encounter  History of Present Illness    James Crockett is a 60 y o  female with a history of alcoholism and substance abuse, referred by Dr Anuradha Damon, presenting for initial consultation regarding a 4 year history of neck pain that radiates into bilateral upper extremities with associated numbness, paresthesias, and subjective weakness worse on the right than left  She denies any trauma or inciting event  She denies any bladder or bowel incontinence, or balance issues  MRI of cervical spine from 2018 reveals disc bulge at C5-6 with flattening of the ventral margin of the cord  X-ray of the right shoulder reveals mild osteoarthritis at the [facility] joint  The patient has done physical therapy with moderate transient relief  She does take gabapentin 300 mg p r n  Which is prescribed by Neurology  She does get mild relief with this  She does take ibuprofen p r n  With mild relief  Meloxicam has been ineffective  She has previously tried muscle relaxants without much relief  She has tried Nortriptyline and Amitriptyline in the past without relief  The patient rates her pain a 6/10 on the pain is nearly constant  The pain is worse in the morning    The pain is described as shooting, numbness, sharp, pins and needles, pressure like, throbbing, dull, and aching  The pain is increased with standing and bending  The pain is alleviated with relaxation and exercise  Other than as stated above, the patient denies any interval changes in medications, medical condition, mental condition, symptoms, or allergies since the last office visit  I have personally reviewed and/or updated the patient's past medical history, past surgical history, family history, social history, current medications, allergies, and vital signs today  Review of Systems   Constitutional: Negative for fever and unexpected weight change  HENT: Negative for trouble swallowing  Eyes: Negative for visual disturbance  Respiratory: Negative for shortness of breath and wheezing  Cardiovascular: Positive for palpitations  Negative for chest pain  Gastrointestinal: Negative for constipation, diarrhea, nausea and vomiting  Endocrine: Negative for cold intolerance, heat intolerance and polydipsia  Genitourinary: Negative for difficulty urinating and frequency  Musculoskeletal: Positive for joint swelling and myalgias  Negative for arthralgias and gait problem  Skin: Negative for rash  Neurological: Positive for dizziness and numbness  Negative for seizures, syncope, weakness and headaches  Hematological: Does not bruise/bleed easily  Psychiatric/Behavioral: Negative for dysphoric mood  Anxiety, depression   All other systems reviewed and are negative        Patient Active Problem List   Diagnosis    Adverse reaction to statin medication    Arthritis    Asthma    Colon polyps    GERD (gastroesophageal reflux disease)    Hyperlipemia, mixed    Essential hypertension    IBS (irritable bowel syndrome)    Nodule of apex of right lung    TB lung fibrosis, exam unkn    Class 1 obesity due to excess calories with serious comorbidity and body mass index (BMI) of 34 0 to 34 9 in adult    Spinal stenosis in cervical region   Harrison Posey Former consumption of alcohol    Dense breast    Pleurisy       Past Medical History:   Diagnosis Date    Abscess of back     Resolved 1/11/2018     Adverse reaction to statin medication     Resolved 1/11/2018     Alcoholism ([address] )     AGE 27    Anesthesia complication     extreme dizziness upon awakening    Anxiety     Chronic pain disorder      rt side pinched nerve    Depression     Edema     GERD (gastroesophageal reflux disease)     Hyperlipidemia     Hypertension     Irregular heart beat     palpitations    Irritable bowel syndrome     Liver disease     fatty    Neck pain     Pinched nerve     Last assessed 9/26/2017     PONV (postoperative nausea and vomiting)     Tuberculosis     1986       Past Surgical History:   Procedure Laterality Date    CARDIAC CATHETERIZATION      due to brothers advanced heart disease and abnormal stress test    COLONOSCOPY N/A 12/6/2017    Procedure: COLONOSCOPY;  Surgeon: Christophe Allen MD;  Location: [facility]; Service: Gastroenterology    COLONOSCOPY  12    ELBOW SURGERY Right     tendon repair, post op nausea and dizziness    ESOPHAGOGASTRODUODENOSCOPY N/A 12/6/2017    Procedure: ESOPHAGOGASTRODUODENOSCOPY (EGD); Surgeon: Christophe Allen MD;  Location: [facility];   Service: Gastroenterology    ESOPHAGOGASTRODUODENOSCOPY  1986    Diagnostic     SKIN BIOPSY      Last assessed 9/28/2017     [de-identified] TOOTH EXTRACTION         Family History   Problem Relation Age of Onset    Alzheimer's disease Mother     Thyroid disease Mother     Edema Mother     Osteoarthritis Mother         Knee    Stroke Father     Hypertension Father     Hyperlipidemia Father     Arthritis Father     Coronary artery disease Father     Hypothyroidism Father     Other Father         status post coronary artery bypass graft     Stroke Sister     Heart failure Sister     Heart disease Sister     Drug abuse Brother     Completed Suicide  Brother        Social History     Occupational History    Not on file   Tobacco Use    Smoking status: Former Smoker     Packs/day: 1 00     Years: 9 00     Pack years: 9 00     Last attempt to quit:      Years since quittin 6    Smokeless tobacco: Never Used   Substance and Sexual Activity    Alcohol use: No     Comment: 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    Drug use: No    Sexual activity: Not Currently       Current Outpatient Medications on File Prior to Visit   Medication Sig    Calcium Carbonate-Vitamin D (CALCIUM 600+D HIGH POTENCY PO) Take by mouth daily    Cholecalciferol (VITAMIN D3) 5000 units CAPS Take by mouth daily    famotidine (PEPCID) 40 MG tablet Take 1 tablet by mouth daily as needed    Flaxseed, Linseed, (FLAX SEED OIL) 1000 MG CAPS Take 2 capsules by mouth daily     fluticasone (FLONASE) 50 mcg/act nasal spray 2 sprays into each nostril daily (Patient taking differently: 2 sprays into each nostril as needed )    gabapentin (NEURONTIN) 300 mg capsule Take 1 capsule (300 mg total) by mouth 2 (two) times a day as needed (neuropathy)    ibuprofen (MOTRIN) 200 mg tablet Take by mouth every 6 (six) hours as needed for mild pain    losartan-hydrochlorothiazide (HYZAAR) 50-12 5 mg per tablet Take 1 tablet by mouth daily    meloxicam (MOBIC) 7 5 mg tablet Take 1 tablet (7 5 mg total) by mouth daily     No current facility-administered medications on file prior to visit  No Known Allergies    Physical Exam    There were no vitals taken for this visit  Constitutional: normal, well developed, well nourished, alert, in no distress and non-toxic and no overt pain behavior    Eyes: anicteric  HEENT: grossly intact  Neck: supple, symmetric, trachea midline and no masses   Pulmonary:even and unlabored  Cardiovascular:No edema or pitting edema present  Skin:Normal without rashes or lesions and well hydrated  Psychiatric:Mood and affect appropriate  Neurologic:Cranial Nerves II-XII grossly intact  Musculoskeletal:normal gait  Bilateral cervical paraspinals tender to palpation  Full range of motion of cervical spine in all planes  Bilateral biceps, triceps, and brachioradialis reflexes were 2/4 and symmetrical   Negative Elizabeth's reflex bilaterally  Bilateral upper extremity strength 5/5 in all muscle groups  Sensation intact to light touch in C5 through T1 dermatomes bilaterally  Negative Spurling's bilaterally  Negative Neer and empty can test bilaterally  Negative Tinel's over bilateral wrists and cubital tunnels      Imaging      PACS Images      Show images for XR shoulder 2+ vw right    Study Result     RIGHT SHOULDER     INDICATION:   M75 101: Unspecified rotator cuff tear or rupture of right shoulder, not specified as traumatic        COMPARISON:  None     VIEWS:  XR SHOULDER 2+ VW RIGHT  Images: 3     FINDINGS:  There is no acute fracture or dislocation      Mild osteoarthritis acromioclavicular joint      No lytic or blastic osseous lesion      Soft tissues are unremarkable      IMPRESSION:  No acute osseous abnormality      Workstation performed: [id number]

## 2020-08-21 NOTE — PATIENT INSTRUCTIONS
Epidural Steroid Injection   WHAT YOU NEED TO KNOW:   What do I need to know about an epidural steroid injection (RAHEEM)? An RAHEEM is a procedure to inject steroid medicine into the epidural space  The epidural space is between your spinal cord and vertebrae  Steroids reduce inflammation and fluid buildup in your spine that may be causing pain  You may be given pain medicine along with the steroids  How do I prepare for an RAHEEM? Your healthcare provider will talk to you about how to prepare for your procedure  He will tell you what medicines to take or not take on the day of your procedure  You may need to stop taking blood thinners or other medicines several days before your procedure  You may need to adjust any diabetes medicine you take on the day of your procedure  Steroid medicine can increase your blood sugar level  What will happen during an RAHEEM? · You will be given medicine to numb the procedure area  You will be awake for the procedure, but you will not feel pain  You may also be given medicine to help you relax during the procedure  Contrast liquid will be used to help your healthcare provider see the area better  Tell the healthcare provider if you have ever had an allergic reaction to contrast liquid  · Your healthcare provider may place the needle into your neck area, middle of your back, or tailbone area  He may inject the medicine next to the nerves that are causing your pain  He may instead inject the medicine into a larger area of the epidural space  This helps the medicine spread to more nerves  Your healthcare provider will use a fluoroscope to help guide the needle to the right place  A fluoroscope is a type of x-ray  After the procedure, a bandage will be placed over the injection site to prevent infection  What are the risks of an RAHEEM? You may have temporary or permanent nerve damage or paralysis   You may have bleeding or develop a serious infection, such as meningitis (swelling of the brain coverings)  An abscess may also develop  An abscess is a pus-filled area under the skin  You may need surgery to fix the abscess  You may have a seizure, anxiety, or trouble sleeping  If you are a man, you may have temporary erectile dysfunction (not able to have an erection)  CARE AGREEMENT:   You have the right to help plan your care  Learn about your health condition and how it may be treated  Discuss treatment options with your caregivers to decide what care you want to receive  You always have the right to refuse treatment  The above information is an  only  It is not intended as medical advice for individual conditions or treatments  Talk to your doctor, nurse or pharmacist before following any medical regimen to see if it is safe and effective for you  © 2017 [facility] Information is for End User's use only and may not be sold, redistributed or otherwise used for commercial purposes  All illustrations and images included in CareNotes® are the copyrighted property of A D A M [employer] Inc  or [employer]

## 2020-08-24 ENCOUNTER — OFFICE VISIT (OUTPATIENT)
Dept: PHYSICAL THERAPY | Facility: OTHER | Age: 55
End: 2020-08-24
Payer: COMMERCIAL

## 2020-08-24 DIAGNOSIS — M54.12 CERVICAL RADICULOPATHY: ICD-10-CM

## 2020-08-24 DIAGNOSIS — M75.101 ROTATOR CUFF SYNDROME, RIGHT: Primary | ICD-10-CM

## 2020-08-24 PROCEDURE — 97110 THERAPEUTIC EXERCISES: CPT | Performed by: PHYSICAL THERAPIST

## 2020-08-24 PROCEDURE — 97112 NEUROMUSCULAR REEDUCATION: CPT | Performed by: PHYSICAL THERAPIST

## 2020-08-24 PROCEDURE — 97140 MANUAL THERAPY 1/> REGIONS: CPT | Performed by: PHYSICAL THERAPIST

## 2020-08-24 NOTE — PROGRESS NOTES
Daily Note     Today's date: 2020  Patient name: Cecilio Yang  : 1965  MRN: [id number]  Referring provider: Alphonse Lucero MD  Dx:   Encounter Diagnosis     ICD-10-CM    1  Rotator cuff syndrome, right  M75 101    2  Cervical radiculopathy  M54 12        Start Time:   Stop Time: 1700  Total time in clinic (min): 45 minutes  1 on1 for entirety  Subjective: Patient reports gradual improvement in neck pain since IE  States she is scheduled for an injection in 1 week  Reports "cold" sensation in her hands and continues to note clumsiness in her R hand  Objective: See treatment diary below      Assessment: Tolerated treatment fair  Patient demonstrated fatigue post treatment and would benefit from continued PT      Plan: Continue per plan of care        Precautions: chronic neck, shoulder pain, cervical disc herniation and stenosis      Manuals            ISTM UT  EB           PA glides side glides, grade 3  EB           Suboccipital and scallene TRP R  EB                        Neuro Re-Ed             Cervical retraction 10 x 2 x 10           scap squeeze 3 x 10 5" 3 x 10 5" hold           TB LPD/row             SL ER             AROM of shoulder with scap squeeze             Gripper hand and fingers  1' red            Finger web  20 x ea                        Ther Ex             ube  3/3           Upper trap stretch 10 x 10" 10 x 10"           pec stretch 4 x 30" 4 x 30"                                                                            Ther Activity                                       Gait Training                                       Modalities

## 2020-08-26 ENCOUNTER — OFFICE VISIT (OUTPATIENT)
Dept: PHYSICAL THERAPY | Facility: OTHER | Age: 55
End: 2020-08-26
Payer: COMMERCIAL

## 2020-08-26 DIAGNOSIS — M75.101 ROTATOR CUFF SYNDROME, RIGHT: Primary | ICD-10-CM

## 2020-08-26 DIAGNOSIS — M54.12 CERVICAL RADICULOPATHY: ICD-10-CM

## 2020-08-26 PROCEDURE — 97110 THERAPEUTIC EXERCISES: CPT | Performed by: PHYSICAL THERAPIST

## 2020-08-26 PROCEDURE — 97112 NEUROMUSCULAR REEDUCATION: CPT | Performed by: PHYSICAL THERAPIST

## 2020-08-26 NOTE — PROGRESS NOTES
Daily Note     Today's date: 2020  Patient name: Tashi Penaloza  : 1965  MRN: [id number]  Referring provider: Vinny Arriola MD  Dx:   Encounter Diagnosis     ICD-10-CM    1  Rotator cuff syndrome, right  M75 101    2  Cervical radiculopathy  M54 12        Start Time: 6140  Stop Time: 1700  Total time in clinic (min): 45 minutes  1 on1 from 420-500, not billed remainder    Subjective: Patient reports gradual improvement in neck pain since IE  States she is scheduled for an injection in 1 week  Reports "cold" sensation in her hands and continues to note clumsiness in her R hand  Objective: See treatment diary below      Assessment: Tolerated treatment fair  Patient demonstrated fatigue post treatment and would benefit from continued PT  Patient with good tolerance to today's program  Patient could likely progress scap strengthening next visit  Plan: Continue per plan of care        Precautions: chronic neck, shoulder pain, cervical disc herniation and stenosis      Manuals           ISTM UT  EB EB          PA glides side glides, grade 3  EB EB          Suboccipital and scallene TRP R  EB EB                       Neuro Re-Ed             Cervical retraction 10 x 2 x 10 3 x 10           scap squeeze 3 x 10 5" 3 x 10 5" hold 3 x 10, 5" hold           scap punch   3 x 10           TB LPD/row             SL ER   3 x 10 add resistance NV          AROM of shoulder with scap squeeze             Gripper hand and fingers  1' red  1' red          Finger web, ext flex  20 x ea 20 x ea                       Ther Ex             ube  3/3 3/3          Upper trap stretch 10 x 10" 10 x 10" 10 x 10"          pec stretch 4 x 30" 4 x 30"                                                                            Ther Activity                                       Gait Training                                       Modalities

## 2020-08-31 ENCOUNTER — OFFICE VISIT (OUTPATIENT)
Dept: PHYSICAL THERAPY | Facility: OTHER | Age: 55
End: 2020-08-31
Payer: COMMERCIAL

## 2020-08-31 DIAGNOSIS — M75.101 ROTATOR CUFF SYNDROME, RIGHT: Primary | ICD-10-CM

## 2020-08-31 DIAGNOSIS — M54.12 CERVICAL RADICULOPATHY: ICD-10-CM

## 2020-08-31 PROCEDURE — 97140 MANUAL THERAPY 1/> REGIONS: CPT | Performed by: PHYSICAL THERAPIST

## 2020-08-31 PROCEDURE — 97112 NEUROMUSCULAR REEDUCATION: CPT | Performed by: PHYSICAL THERAPIST

## 2020-08-31 PROCEDURE — 97110 THERAPEUTIC EXERCISES: CPT | Performed by: PHYSICAL THERAPIST

## 2020-08-31 NOTE — PROGRESS NOTES
Daily Note     Today's date: 2020  Patient name: Arnaldo De La Cruz  : 1965  MRN: [id number]  Referring provider: Uriel Mcdonald MD  Dx:   Encounter Diagnosis     ICD-10-CM    1  Rotator cuff syndrome, right  M75 101    2  Cervical radiculopathy  M54 12        Start Time: 6644  Stop Time: 1655  Total time in clinic (min): 40 minutes  1 on1 from 415-455, not billed remainder    Subjective: Patient reports she continues to have difficulty with  strength  Patient reports she is nervous for her injection on Wednesday  Objective: See treatment diary below      Assessment: Tolerated treatment fair  Patient demonstrated fatigue post treatment and would benefit from continued PT  Patient with good tolerance to today's program  Progressed scap strengthening as charted  Plan: Continue per plan of care        Precautions: chronic neck, shoulder pain, cervical disc herniation and stenosis      Manuals          ISTM UT  EB EB EB         PA glides side glides, grade 3  EB EB EB         Suboccipital and scallene TRP R  EB EB EB                      Neuro Re-Ed             Cervical retraction 10 x 2 x 10 3 x 10  3 x 10          scap squeeze 3 x 10 5" 3 x 10 5" hold 3 x 10, 5" hold  3 x 10 5" hold         scap punch   3 x 10  hold         TB LPD/row    3 x 10 OTB on PBALL         SL ER   3 x 10 add resistance NV 3 x 10, 2#         AROM of shoulder with scap squeeze             Gripper hand and fingers  1' red  1' red 1' red         Finger web, ext flex  20 x ea 20 x ea 20 x ea         Wall slide    10 x 10"         Ther Ex             ube  3/3 3/3 3/3         Upper trap stretch 10 x 10" 10 x 10" 10 x 10" 4 x 30"         pec stretch 4 x 30" 4 x 30"  4 x 30"                                                                          Ther Activity                                       Gait Training                                       Modalities

## 2020-09-02 ENCOUNTER — APPOINTMENT (OUTPATIENT)
Dept: PHYSICAL THERAPY | Facility: OTHER | Age: 55
End: 2020-09-02
Payer: COMMERCIAL

## 2020-09-02 ENCOUNTER — HOSPITAL ENCOUNTER (OUTPATIENT)
Dept: RADIOLOGY | Facility: CLINIC | Age: 55
Discharge: HOME | End: 2020-09-02
Attending: ANESTHESIOLOGY | Admitting: ANESTHESIOLOGY
Payer: COMMERCIAL

## 2020-09-02 VITALS
DIASTOLIC BLOOD PRESSURE: 83 MMHG | HEART RATE: 84 BPM | RESPIRATION RATE: 20 BRPM | SYSTOLIC BLOOD PRESSURE: 124 MMHG | OXYGEN SATURATION: 97 % | TEMPERATURE: 98.3 F

## 2020-09-02 DIAGNOSIS — M54.12 CERVICAL RADICULOPATHY: ICD-10-CM

## 2020-09-02 PROCEDURE — 62321 NJX INTERLAMINAR CRV/THRC: CPT | Performed by: ANESTHESIOLOGY

## 2020-09-02 RX ORDER — LIDOCAINE HYDROCHLORIDE 10 MG/ML
5 INJECTION, SOLUTION EPIDURAL; INFILTRATION; INTRACAUDAL; PERINEURAL ONCE
Status: COMPLETED | OUTPATIENT
Start: 2020-09-02 | End: 2020-09-02

## 2020-09-02 RX ORDER — PAPAVERINE HCL 150 MG
10 CAPSULE, EXTENDED RELEASE ORAL ONCE
Status: COMPLETED | OUTPATIENT
Start: 2020-09-02 | End: 2020-09-02

## 2020-09-02 RX ADMIN — IOHEXOL 1 ML: 300 INJECTION, SOLUTION INTRAVENOUS at 14:21

## 2020-09-02 RX ADMIN — DEXAMETHASONE SODIUM PHOSPHATE 10 MG: 10 INJECTION, SOLUTION INTRAMUSCULAR; INTRAVENOUS at 14:22

## 2020-09-02 RX ADMIN — LIDOCAINE HYDROCHLORIDE 3 ML: 10 INJECTION, SOLUTION EPIDURAL; INFILTRATION; INTRACAUDAL; PERINEURAL at 14:21

## 2020-09-02 NOTE — DISCHARGE INSTR - LAB
Epidural Steroid Injection   WHAT YOU NEED TO KNOW:   An epidural steroid injection (RAHEEM) is a procedure to inject steroid medicine into the epidural space  The epidural space is between your spinal cord and vertebrae  Steroids reduce inflammation and fluid buildup in your spine that may be causing pain  You may be given pain medicine along with the steroids  ACTIVITY  · Do not drive or operate machinery today  · No strenuous activity today - bending, lifting, etc   · You may resume normal activites starting tomorrow - start slowly and as tolerated  · You may shower today, but no tub baths or hot tubs  · You may have numbness for several hours from the local anesthetic  Please use caution and common sense, especially with weight-bearing activities  CARE OF THE INJECTION SITE  · If you have soreness or pain, apply ice to the area today (20 minutes on/20 minutes off)  · Starting tomorrow, you may use warm, moist heat or ice if needed  · You may have an increase or change in your discomfort for 36-48 hours after your treatment  · Apply ice and continue with any pain medication you have been prescribed  · Notify [facility] if you have any of the following: redness, drainage, swelling, headache, stiff neck or fever above 100°F     SPECIAL INSTRUCTIONS  · Our office will contact you in approximately 7 days for a progress report  MEDICATIONS  · Continue to take all routine medications  · Our office may have instructed you to hold some medications  If you have a problem specifically related to your procedure, please call our office at [phone]  Problems not related to your procedure should be directed to your primary care physician

## 2020-09-02 NOTE — H&P
History of Present Illness: The patient is a 60 y o  female who presents with complaints of neck and arm pain  Patient Active Problem List   Diagnosis    Adverse reaction to statin medication    Arthritis    Asthma    Colon polyps    GERD (gastroesophageal reflux disease)    Hyperlipemia, mixed    Essential hypertension    IBS (irritable bowel syndrome)    Nodule of apex of right lung    TB lung fibrosis, exam unkn    Class 1 obesity due to excess calories with serious comorbidity and body mass index (BMI) of 34 0 to 34 9 in adult    Spinal stenosis in cervical region    Former consumption of alcohol    Dense breast    Pleurisy    Cervical radiculopathy    DDD (degenerative disc disease), cervical       Past Medical History:   Diagnosis Date    Abscess of back     Resolved 1/11/2018     Adverse reaction to statin medication     Resolved 1/11/2018     Alcoholism ([address] )     AGE 27    Anesthesia complication     extreme dizziness upon awakening    Anxiety     Chronic pain disorder      rt side pinched nerve    Depression     Edema     GERD (gastroesophageal reflux disease)     Hyperlipidemia     Hypertension     Irregular heart beat     palpitations    Irritable bowel syndrome     Liver disease     fatty    Neck pain     Pinched nerve     Last assessed 9/26/2017     PONV (postoperative nausea and vomiting)     Tuberculosis     1986       Past Surgical History:   Procedure Laterality Date    CARDIAC CATHETERIZATION      due to brothers advanced heart disease and abnormal stress test    COLONOSCOPY N/A 12/6/2017    Procedure: COLONOSCOPY;  Surgeon: Amanda Mcfadden MD;  Location: [place] [address] GI LAB; Service: Gastroenterology    COLONOSCOPY  12    ELBOW SURGERY Right     tendon repair, post op nausea and dizziness    ESOPHAGOGASTRODUODENOSCOPY N/A 12/6/2017    Procedure: ESOPHAGOGASTRODUODENOSCOPY (EGD); Surgeon: Amanda Mcfadden MD;  Location: [facility];   Service: Gastroenterology  ESOPHAGOGASTRODUODENOSCOPY  1986    Diagnostic     SKIN BIOPSY      Last assessed 9/28/2017     WISDOM TOOTH EXTRACTION           Current Outpatient Medications:     Calcium Carbonate-Vitamin D (CALCIUM 600+D HIGH POTENCY PO), Take by mouth daily, Disp: , Rfl:     Cholecalciferol (VITAMIN D3) 5000 units CAPS, Take by mouth daily, Disp: , Rfl:     famotidine (PEPCID) 40 MG tablet, Take 1 tablet by mouth daily as needed, Disp: , Rfl:     Flaxseed, Linseed, (FLAX SEED OIL) 1000 MG CAPS, Take 2 capsules by mouth daily , Disp: , Rfl:     fluticasone (FLONASE) 50 mcg/act nasal spray, 2 sprays into each nostril daily (Patient taking differently: 2 sprays into each nostril as needed ), Disp: 16 g, Rfl: 0    gabapentin (NEURONTIN) 300 mg capsule, Take 1 capsule (300 mg total) by mouth 2 (two) times a day as needed (neuropathy), Disp: 60 capsule, Rfl: 2    ibuprofen (MOTRIN) 200 mg tablet, Take by mouth every 6 (six) hours as needed for mild pain, Disp: , Rfl:     losartan-hydrochlorothiazide (HYZAAR) 50-12 5 mg per tablet, Take 1 tablet by mouth daily, Disp: 90 tablet, Rfl: 1    No Known Allergies    Physical Exam:   Vitals:    09/02/20 1404   BP: 130/83   Pulse: 87   Resp: 20   Temp: 98 3 °F (36 8 °C)   SpO2: 96%     General: Awake, Alert, Oriented x 3, Mood and affect appropriate  Respiratory: Respirations even and unlabored  Cardiovascular: Peripheral pulses intact; no edema  Musculoskeletal Exam:  Bilateral cervical paraspinals tender to palpation  ASA Score: 2    Patient/Chart Verification  Patient ID Verified: Verbal  ID Band Applied: No  Consents Confirmed: Procedural  H&P( within 30 days) Verified: To be obtained in the Pre-Procedure area  Interval H&P(within 24 hr) Complete (required for Outpatients and Surgery Admit only): To be obtained in the Pre-Procedure area  Allergies Reviewed: Yes  Anticoag/NSAID held?: No  Currently on antibiotics?: No    Assessment:   1   Cervical radiculopathy        Plan: C6-7 DESIREE

## 2020-09-03 ENCOUNTER — OFFICE VISIT (OUTPATIENT)
Dept: PHYSICAL THERAPY | Facility: OTHER | Age: 55
End: 2020-09-03
Payer: COMMERCIAL

## 2020-09-03 DIAGNOSIS — M75.101 ROTATOR CUFF SYNDROME, RIGHT: Primary | ICD-10-CM

## 2020-09-03 DIAGNOSIS — M54.12 CERVICAL RADICULOPATHY: ICD-10-CM

## 2020-09-03 PROCEDURE — 97140 MANUAL THERAPY 1/> REGIONS: CPT

## 2020-09-03 PROCEDURE — 97112 NEUROMUSCULAR REEDUCATION: CPT

## 2020-09-03 PROCEDURE — 97110 THERAPEUTIC EXERCISES: CPT

## 2020-09-03 NOTE — PROGRESS NOTES
Daily Note     Today's date: 9/3/2020  Patient name: Rachel Troy  : 1965  MRN: [id number]  Referring provider: Jcarlos Ortez MD  Dx:   Encounter Diagnosis     ICD-10-CM    1  Rotator cuff syndrome, right  M75 101    2  Cervical radiculopathy  M54 12            1 on 1 with PTA           Subjective: Patient states she has no hand pain after injection yesterday  Mild R forearm discomfort but no neck or shoulder pain  Objective: See treatment diary below      Assessment: Tolerated treatment well  Mobs and IASTM held today due to injection yesterday, resume NV  TPR performed in place of IASTM, decreased tightness post  Patient requires frequent verbal and tactile cues for postural positioning during [place]  Patient demonstrated fatigue post treatment and would benefit from continued PT  Plan: Continue per plan of care        Precautions: chronic neck, shoulder pain, cervical disc herniation and stenosis      Manuals 8/19 8/24 8/26 8/31 9/3        ISTM UT  EB EB EB TPR today MP        PA glides side glides, grade 3  EB EB EB NP        Suboccipital and scallene TRP R  EB EB EB NP                     Neuro Re-Ed             Cervical retraction 10 x 2 x 10 3 x 10  3 x 10  3 x 10         scap squeeze 3 x 10 5" 3 x 10 5" hold 3 x 10, 5" hold  3 x 10 5" hold 3 x 10 5" hold        scap punch   3 x 10  hold         TB LPD/row    3 x 10 OTB on PBALL 3 x 10 OTB on pball        SL ER   3 x 10 add resistance NV 3 x 10, 2# np resume        AROM of shoulder with scap squeeze             Gripper hand and fingers  1' red  1' red 1' red 1' red        Finger web, ext flex  20 x ea 20 x ea 20 x ea Np resume        Wall slide    10 x 10" 10 x 10"        Ther Ex             ube  3/3 3/3 33 3'/3'        Upper trap stretch 10 x 10" 10 x 10" 10 x 10" 4 x 30" 4 x 30"        pec stretch 4 x 30" 4 x 30"  4 x 30" 4 x 30"                                                                         Ther Activity Gait Training                                       Modalities

## 2020-09-08 ENCOUNTER — OFFICE VISIT (OUTPATIENT)
Dept: PHYSICAL THERAPY | Facility: OTHER | Age: 55
End: 2020-09-08
Payer: COMMERCIAL

## 2020-09-08 DIAGNOSIS — M75.101 ROTATOR CUFF SYNDROME, RIGHT: Primary | ICD-10-CM

## 2020-09-08 DIAGNOSIS — M54.12 CERVICAL RADICULOPATHY: ICD-10-CM

## 2020-09-08 PROCEDURE — 97140 MANUAL THERAPY 1/> REGIONS: CPT

## 2020-09-08 PROCEDURE — 97110 THERAPEUTIC EXERCISES: CPT

## 2020-09-08 NOTE — PROGRESS NOTES
Daily Note     Today's date: 2020  Patient name: Carmelo Elias  : 1965  MRN: [id number]  Referring provider: Quynh Salinas MD  Dx:   Encounter Diagnosis     ICD-10-CM    1  Rotator cuff syndrome, right  M75 101    2  Cervical radiculopathy  M54 12            1 on 1 with PTA 4:15-5:00           Subjective: Patient reports some wrist pain, but no UT discomfort since injection  Objective: See treatment diary below      Assessment: Tolerated treatment well  Tightness throughout UT which was decreased post manuals  Patient demonstrated fatigue post treatment and would benefit from continued PT  Plan: Continue per plan of care  Progress as able  Precautions: chronic neck, shoulder pain, cervical disc herniation and stenosis      Manuals 8/19 8/24 8/26 8/31 9/3 9/8       ISTM UT  EB EB EB TPR today MP MP       PA glides side glides, grade 3  EB EB EB NP        Suboccipital and scallene TRP R  EB EB EB NP                     Neuro Re-Ed             Cervical retraction 10 x 2 x 10 3 x 10  3 x 10  3 x 10  3 x 10       scap squeeze 3 x 10 5" 3 x 10 5" hold 3 x 10, 5" hold  3 x 10 5" hold 3 x 10 5" hold 3 x 10 5" hold        scap punch   3 x 10  hold         TB LPD/row    3 x 10 OTB on PBALL 3 x 10 OTB on pball 2 x 10 GTB on pball       SL ER   3 x 10 add resistance NV 3 x 10, 2# np resume        AROM of shoulder with scap squeeze             Gripper hand and fingers  1' red  1' red 1' red 1' red 1' green       Finger web, ext flex  20 x ea 20 x ea 20 x ea Np resume 30 ea         Wall slide    10 x 10" 10 x 10"        Ther Ex             ube  3/3 3/3 3/3 3'/3' 3'/3'       Upper trap stretch 10 x 10" 10 x 10" 10 x 10" 4 x 30" 4 x 30" 4 x 30"       pec stretch 4 x 30" 4 x 30"  4 x 30" 4 x 30" 4 x 30"                                                                        Ther Activity                                       Gait Training                                       Modalities

## 2020-09-09 ENCOUNTER — TELEPHONE (OUTPATIENT)
Dept: PAIN MEDICINE | Facility: CLINIC | Age: 55
End: 2020-09-09

## 2020-09-10 ENCOUNTER — APPOINTMENT (OUTPATIENT)
Dept: PHYSICAL THERAPY | Facility: OTHER | Age: 55
End: 2020-09-10
Payer: COMMERCIAL

## 2020-09-14 ENCOUNTER — OFFICE VISIT (OUTPATIENT)
Dept: URGENT CARE | Age: 55
End: 2020-09-14
Payer: COMMERCIAL

## 2020-09-14 ENCOUNTER — APPOINTMENT (OUTPATIENT)
Dept: PHYSICAL THERAPY | Facility: OTHER | Age: 55
End: 2020-09-14
Payer: COMMERCIAL

## 2020-09-14 VITALS
HEART RATE: 87 BPM | SYSTOLIC BLOOD PRESSURE: 137 MMHG | TEMPERATURE: 97.4 F | BODY MASS INDEX: 35.87 KG/M2 | RESPIRATION RATE: 18 BRPM | OXYGEN SATURATION: 97 % | DIASTOLIC BLOOD PRESSURE: 73 MMHG | WEIGHT: 190 LBS | HEIGHT: 61 IN

## 2020-09-14 DIAGNOSIS — H66.001 ACUTE SUPPURATIVE OTITIS MEDIA OF RIGHT EAR WITHOUT SPONTANEOUS RUPTURE OF TYMPANIC MEMBRANE, RECURRENCE NOT SPECIFIED: Primary | ICD-10-CM

## 2020-09-14 PROCEDURE — G0382 LEV 3 HOSP TYPE B ED VISIT: HCPCS | Performed by: PHYSICIAN ASSISTANT

## 2020-09-14 RX ORDER — AMOXICILLIN 500 MG/1
500 CAPSULE ORAL EVERY 8 HOURS SCHEDULED
Qty: 30 CAPSULE | Refills: 0 | Status: SHIPPED | OUTPATIENT
Start: 2020-09-14 | End: 2020-09-24

## 2020-09-15 NOTE — PROGRESS NOTES
[id number]        NAME: Payton Choe is a 60 y o  female  : 1965    MRN: [id number]  DATE: 2020  TIME: 10:43 PM    Assessment and Plan   Acute suppurative otitis media of right ear without spontaneous rupture of tympanic membrane, recurrence not specified [[id number] 001]  1  Acute suppurative otitis media of right ear without spontaneous rupture of tympanic membrane, recurrence not specified  amoxicillin (AMOXIL) 500 mg capsule         Patient Instructions       Follow up with PCP in 3-5 days  Proceed to  ER if symptoms worsen  Chief Complaint   No chief complaint on file  History of Present Illness       Patient is here for evaluation of sore throat and right ear pain  Patient's symptoms started few days ago  She denies any nausea, vomiting, cough, shortness of breath  Patient states she does have some episodes of feeling off balance  Review of Systems   Review of Systems   Constitutional: Negative  HENT: Positive for ear pain and sore throat  Negative for congestion, postnasal drip, rhinorrhea, sinus pressure, sinus pain and trouble swallowing  Eyes: Negative  Respiratory: Negative  Cardiovascular: Negative            Current Medications       Current Outpatient Medications:     Calcium Carbonate-Vitamin D (CALCIUM 600+D HIGH POTENCY PO), Take by mouth daily, Disp: , Rfl:     Cholecalciferol (VITAMIN D3) 5000 units CAPS, Take by mouth daily, Disp: , Rfl:     famotidine (PEPCID) 40 MG tablet, Take 1 tablet by mouth daily as needed, Disp: , Rfl:     Flaxseed, Linseed, (FLAX SEED OIL) 1000 MG CAPS, Take 2 capsules by mouth daily , Disp: , Rfl:     fluticasone (FLONASE) 50 mcg/act nasal spray, 2 sprays into each nostril daily (Patient taking differently: 2 sprays into each nostril as needed ), Disp: 16 g, Rfl: 0    gabapentin (NEURONTIN) 300 mg capsule, Take 1 capsule (300 mg total) by mouth 2 (two) times a day as needed (neuropathy), Disp: 60 capsule, Rfl: 2    ibuprofen (MOTRIN) 200 mg tablet, Take by mouth every 6 (six) hours as needed for mild pain, Disp: , Rfl:     losartan-hydrochlorothiazide (HYZAAR) 50-12 5 mg per tablet, Take 1 tablet by mouth daily, Disp: 90 tablet, Rfl: 1    amoxicillin (AMOXIL) 500 mg capsule, Take 1 capsule (500 mg total) by mouth every 8 (eight) hours for 10 days, Disp: 30 capsule, Rfl: 0    Current Allergies     Allergies as of 09/14/2020 - Reviewed 09/14/2020   Allergen Reaction Noted    Banana GI Intolerance 09/14/2020            The following portions of the patient's history were reviewed and updated as appropriate: allergies, current medications, past family history, past medical history, past social history, past surgical history and problem list      Past Medical History:   Diagnosis Date    Abscess of back     Resolved 1/11/2018     Adverse reaction to statin medication     Resolved 1/11/2018     Alcoholism ([address] )     AGE 27    Anesthesia complication     extreme dizziness upon awakening    Anxiety     Chronic pain disorder      rt side pinched nerve    Depression     Edema     GERD (gastroesophageal reflux disease)     Hyperlipidemia     Hypertension     Irregular heart beat     palpitations    Irritable bowel syndrome     Liver disease     fatty    Neck pain     Pinched nerve     Last assessed 9/26/2017     PONV (postoperative nausea and vomiting)     Tuberculosis     1986       Past Surgical History:   Procedure Laterality Date    CARDIAC CATHETERIZATION      due to brothers advanced heart disease and abnormal stress test    COLONOSCOPY N/A 12/6/2017    Procedure: COLONOSCOPY;  Surgeon: Seth Orosco MD;  Location: [facility]; Service: Gastroenterology    COLONOSCOPY  12    ELBOW SURGERY Right     tendon repair, post op nausea and dizziness    ESOPHAGOGASTRODUODENOSCOPY N/A 12/6/2017    Procedure: ESOPHAGOGASTRODUODENOSCOPY (EGD);   Surgeon: Seth Orosco MD;  [place] [facility]; Service: Gastroenterology    ESOPHAGOGASTRODUODENOSCOPY  1986    Diagnostic     SKIN BIOPSY      Last assessed 9/28/2017     [de-identified] TOOTH EXTRACTION         Family History   Problem Relation Age of Onset    Alzheimer's disease Mother     Thyroid disease Mother     Edema Mother     Osteoarthritis Mother         Knee    Stroke Father     Hypertension Father     Hyperlipidemia Father     Arthritis Father     Coronary artery disease Father     Hypothyroidism Father     Other Father         status post coronary artery bypass graft     Stroke Sister     Heart failure Sister     Heart disease Sister     Drug abuse Brother     Completed Suicide  Brother          Medications have been verified  Objective   /73   Pulse 87   Temp (!) 97 4 °F (36 3 °C)   Resp 18   Ht 5' 1" (1 549 m)   Wt 86 2 kg (190 lb)   SpO2 97%   BMI 35 90 kg/m²        Physical Exam     Physical Exam  Vitals signs and nursing note reviewed  Constitutional:       General: She is not in acute distress  Appearance: Normal appearance  She is well-developed  She is not ill-appearing, toxic-appearing or diaphoretic  HENT:      Head: Normocephalic and atraumatic  Right Ear: Ear canal normal  A middle ear effusion is present  Tympanic membrane is erythematous and bulging  Tympanic membrane is not perforated or retracted  Left Ear: Ear canal normal  A middle ear effusion is present  Tympanic membrane is not perforated, erythematous, retracted or bulging  Nose: Nose normal  No congestion or rhinorrhea  Mouth/Throat:      Mouth: Mucous membranes are moist       Pharynx: Posterior oropharyngeal erythema present  No oropharyngeal exudate  Eyes:      General:         Right eye: No discharge  Left eye: No discharge  Extraocular Movements: Extraocular movements intact  Conjunctiva/sclera: Conjunctivae normal       Pupils: Pupils are equal, round, and reactive to light     Cardiovascular: Rate and Rhythm: Normal rate and regular rhythm  Heart sounds: Normal heart sounds  No murmur  Pulmonary:      Effort: Pulmonary effort is normal  No respiratory distress  Breath sounds: Normal breath sounds  No stridor  No wheezing, rhonchi or rales  Lymphadenopathy:      Cervical: Cervical adenopathy present  Skin:     General: Skin is warm and dry  Findings: No rash  Neurological:      General: No focal deficit present  Mental Status: She is alert and oriented to person, place, and time  Psychiatric:         Mood and Affect: Mood normal          Behavior: Behavior normal          Thought Content:  Thought content normal          Judgment: Judgment normal

## 2020-09-16 ENCOUNTER — APPOINTMENT (OUTPATIENT)
Dept: PHYSICAL THERAPY | Facility: OTHER | Age: 55
End: 2020-09-16
Payer: COMMERCIAL

## 2020-09-21 ENCOUNTER — OFFICE VISIT (OUTPATIENT)
Dept: PHYSICAL THERAPY | Facility: OTHER | Age: 55
End: 2020-09-21
Payer: COMMERCIAL

## 2020-09-21 DIAGNOSIS — M54.12 CERVICAL RADICULOPATHY: ICD-10-CM

## 2020-09-21 DIAGNOSIS — M75.101 ROTATOR CUFF SYNDROME, RIGHT: Primary | ICD-10-CM

## 2020-09-21 PROCEDURE — 97110 THERAPEUTIC EXERCISES: CPT | Performed by: PHYSICAL THERAPIST

## 2020-09-21 PROCEDURE — 97112 NEUROMUSCULAR REEDUCATION: CPT | Performed by: PHYSICAL THERAPIST

## 2020-09-21 NOTE — PROGRESS NOTES
Daily Note     Today's date: 2020  Patient name: Ritika Springer  : 1965  MRN: [id number]  Referring provider: Malou Ford MD  Dx:   Encounter Diagnosis     ICD-10-CM    1  Rotator cuff syndrome, right  M75 101    2  Cervical radiculopathy  M54 12        Start Time: 94  Stop Time: 59270 on 1 with PT from 420-500, not billed remainder  Total time in clinic (min): 45 minutes        Subjective: Patient reports she has been compliant with home program while she was out sick  Patient reports she is unsure when was the last time that she took ibuprofen  Objective: See treatment diary below      Assessment: Tolerated treatment well  Patient demonstrated fatigue post treatment and would benefit from continued PT  Patient able to resume full program  Patient demonstrating full  strength  Patient with 80# on R and 70# on L  discussed potential d/c at next visit if patient continues with improvement in pain, strength and independence with home program  Reviewed HEP this visit  Plan: Continue per plan of care  Progress as able        Precautions: chronic neck, shoulder pain, cervical disc herniation and stenosis      Manuals 8/19 8/24 8/26 8/31 9/3 9/8 9/21      ISTM UT  EB EB EB TPR today MP MP       PA glides side glides, grade 3  EB EB EB NP        Suboccipital and scallene TRP R  EB EB EB NP                     Neuro Re-Ed             Cervical retraction 10 x 2 x 10 3 x 10  3 x 10  3 x 10  3 x 10 3 x 10      scap squeeze 3 x 10 5" 3 x 10 5" hold 3 x 10, 5" hold  3 x 10 5" hold 3 x 10 5" hold 3 x 10 5" hold  3 x 10 5" hold       scap punch   3 x 10  hold         TB LPD/row    3 x 10 OTB on PBALL 3 x 10 OTB on pball 2 x 10 GTB on pball 3 x 10 GTB on pball      SL ER   3 x 10 add resistance NV 3 x 10, 2# np resume  3 x 10, 2#      AROM of shoulder with scap squeeze             Gripper hand and fingers  1' red  1' red 1' red 1' red 1' green 1' ea blue      Finger web, ext flex  20 x ea 20 x ea 20 x ea Np resume 30 ea   [address] [facility] 16 slide    10 x 10" 10 x 10"  10 x 10"      Ther Ex             ube  3/3 3/3 3/3 3'/3' 3'/3' 3/3      Upper trap stretch 10 x 10" 10 x 10" 10 x 10" 4 x 30" 4 x 30" 4 x 30" 4 x 30"      pec stretch 4 x 30" 4 x 30"  4 x 30" 4 x 30" 4 x 30" 4 x 30"                                                                       Ther Activity                                       Gait Training                                       Modalities

## 2020-09-23 ENCOUNTER — APPOINTMENT (OUTPATIENT)
Dept: PHYSICAL THERAPY | Facility: OTHER | Age: 55
End: 2020-09-23
Payer: COMMERCIAL

## 2020-09-28 ENCOUNTER — TELEPHONE (OUTPATIENT)
Dept: FAMILY MEDICINE CLINIC | Facility: CLINIC | Age: 55
End: 2020-09-28

## 2020-09-28 DIAGNOSIS — Z81.3: Primary | ICD-10-CM

## 2020-09-28 RX ORDER — NALOXONE HYDROCHLORIDE 4 MG/.1ML
SPRAY NASAL
Qty: 1 EACH | Refills: 1 | Status: SHIPPED | OUTPATIENT
Start: 2020-09-28 | End: 2020-10-21 | Stop reason: SDUPTHER

## 2020-09-28 NOTE — TELEPHONE ENCOUNTER
[facility]     Patient is asking if she could have a prescription  For narcan  She has a niece with a heroin addiction and would like some on hand  Please advise

## 2020-09-30 ENCOUNTER — OFFICE VISIT (OUTPATIENT)
Dept: PHYSICAL THERAPY | Facility: OTHER | Age: 55
End: 2020-09-30
Payer: COMMERCIAL

## 2020-09-30 DIAGNOSIS — M54.12 CERVICAL RADICULOPATHY: ICD-10-CM

## 2020-09-30 DIAGNOSIS — M75.101 ROTATOR CUFF SYNDROME, RIGHT: Primary | ICD-10-CM

## 2020-09-30 PROCEDURE — 97112 NEUROMUSCULAR REEDUCATION: CPT | Performed by: PHYSICAL THERAPIST

## 2020-09-30 NOTE — PROGRESS NOTES
Daily Note     Today's date: 2020  Patient name: Payton Choe  : 1965  MRN: [id number]  Referring provider: Santa Valadez MD  Dx:   Encounter Diagnosis     ICD-10-CM    1  Rotator cuff syndrome, right  M75 101    2  Cervical radiculopathy  M54 12        Start Time: 1600  Stop Time: 62567 on 1 with PT for entirety  Total time in clinic (min): 30 minutes        Subjective: Patient reports she is pain free with exception of mild numbness in the tips of digits 2-3  States that she feels as though "nothing ever happened to her neck"  Reports significant improvement in neck pain, referred pain and  strength  She also reports independence and compliance with HEP, and understands the importance of continued completion of HEP to avoid return of symptoms  Objective: See treatment diary below      Assessment: Tolerated treatment well  Patient exhibited good technique with therapeutic exercises  Patient able to resume full program  Patient demonstrating full  strength  Patient with 80# on R and 70# on L  She has full cervical and shoulder ROM, normal neuro screen and improved scap strength  Patient has met all functional goals and is independent and compliant with HEP  At this time she is discharge to a home exercise program focused on cervical mobilty and scap stability  Plan: Discharge to HEP        Precautions: chronic neck, shoulder pain, cervical disc herniation and stenosis      Manuals 8/19 8/24 8/26 8/31 9/3 9/8 9/21 9/29     ISTM UT  EB EB EB TPR today MP MP       PA glides side glides, grade 3  EB EB EB NP        Suboccipital and scallene TRP R  EB EB EB NP                     Neuro Re-Ed             Cervical retraction 10 x 2 x 10 3 x 10  3 x 10  3 x 10  3 x 10 3 x 10 3 x10     scap squeeze 3 x 10 5" 3 x 10 5" hold 3 x 10, 5" hold  3 x 10 5" hold 3 x 10 5" hold 3 x 10 5" hold  3 x 10 5" hold  3 x 10 5"     scap punch   3 x 10  hold         TB LPD/row    3 x 10 OTB on PBALL 3 x 10 OTB on pball 2 x 10 GTB on pball 3 x 10 GTB on pball 3 x 10 GTB on pball     SL ER   3 x 10 add resistance NV 3 x 10, 2# np resume  3 x 10, 2# 3 x 10, 2#     AROM of shoulder with scap squeeze             Gripper hand and fingers  1' red  1' red 1' red 1' red 1' green 1' ea blue      Finger web, ext flex  20 x ea 20 x ea 20 x ea Np resume 30 ea   [address] [facility] 16 slide    10 x 10" 10 x 10"  10 x 10"      Ther Ex             ube  3/3 3/3 3/3 3'/3' 3'/3' 3/3 3/3     Upper trap stretch 10 x 10" 10 x 10" 10 x 10" 4 x 30" 4 x 30" 4 x 30" 4 x 30" 4 x 30"     pec stretch 4 x 30" 4 x 30"  4 x 30" 4 x 30" 4 x 30" 4 x 30" 4 x 30"                                                                      Ther Activity                                       Gait Training                                       Modalities

## 2020-10-09 ENCOUNTER — OFFICE VISIT (OUTPATIENT)
Dept: PAIN MEDICINE | Facility: CLINIC | Age: 55
End: 2020-10-09
Payer: COMMERCIAL

## 2020-10-09 VITALS — BODY MASS INDEX: 35.87 KG/M2 | TEMPERATURE: 98.2 F | HEIGHT: 61 IN | WEIGHT: 190 LBS

## 2020-10-09 DIAGNOSIS — M54.50 CHRONIC BILATERAL LOW BACK PAIN, UNSPECIFIED WHETHER SCIATICA PRESENT: ICD-10-CM

## 2020-10-09 DIAGNOSIS — M50.30 DDD (DEGENERATIVE DISC DISEASE), CERVICAL: ICD-10-CM

## 2020-10-09 DIAGNOSIS — M54.12 CERVICAL RADICULOPATHY: Primary | ICD-10-CM

## 2020-10-09 DIAGNOSIS — M48.02 SPINAL STENOSIS IN CERVICAL REGION: ICD-10-CM

## 2020-10-09 DIAGNOSIS — M25.552 LEFT HIP PAIN: ICD-10-CM

## 2020-10-09 DIAGNOSIS — G89.29 CHRONIC BILATERAL LOW BACK PAIN, UNSPECIFIED WHETHER SCIATICA PRESENT: ICD-10-CM

## 2020-10-09 PROCEDURE — 99214 OFFICE O/P EST MOD 30 MIN: CPT | Performed by: NURSE PRACTITIONER

## 2020-10-21 ENCOUNTER — TELEPHONE (OUTPATIENT)
Dept: FAMILY MEDICINE CLINIC | Facility: CLINIC | Age: 55
End: 2020-10-21

## 2020-10-21 ENCOUNTER — APPOINTMENT (OUTPATIENT)
Dept: RADIOLOGY | Facility: OTHER | Age: 55
End: 2020-10-21
Payer: COMMERCIAL

## 2020-10-21 ENCOUNTER — OFFICE VISIT (OUTPATIENT)
Dept: OBGYN CLINIC | Facility: OTHER | Age: 55
End: 2020-10-21
Payer: COMMERCIAL

## 2020-10-21 VITALS
HEART RATE: 80 BPM | WEIGHT: 192 LBS | DIASTOLIC BLOOD PRESSURE: 90 MMHG | TEMPERATURE: 96.5 F | HEIGHT: 61 IN | SYSTOLIC BLOOD PRESSURE: 144 MMHG | BODY MASS INDEX: 36.25 KG/M2

## 2020-10-21 DIAGNOSIS — M25.552 LEFT HIP PAIN: ICD-10-CM

## 2020-10-21 DIAGNOSIS — Z81.3: ICD-10-CM

## 2020-10-21 DIAGNOSIS — M75.101 ROTATOR CUFF SYNDROME, RIGHT: Primary | ICD-10-CM

## 2020-10-21 PROCEDURE — 73502 X-RAY EXAM HIP UNI 2-3 VIEWS: CPT

## 2020-10-21 PROCEDURE — 99213 OFFICE O/P EST LOW 20 MIN: CPT | Performed by: ORTHOPAEDIC SURGERY

## 2020-10-21 RX ORDER — NALOXONE HYDROCHLORIDE 4 MG/.1ML
SPRAY NASAL
Qty: 1 EACH | Refills: 1 | Status: SHIPPED | OUTPATIENT
Start: 2020-10-21 | End: 2022-04-13

## 2020-11-03 DIAGNOSIS — G89.4 CHRONIC PAIN SYNDROME: ICD-10-CM

## 2020-11-03 DIAGNOSIS — M50.122 CERVICAL DISC DISORDER AT C5-C6 LEVEL WITH RADICULOPATHY: ICD-10-CM

## 2020-11-03 RX ORDER — GABAPENTIN 300 MG/1
300 CAPSULE ORAL 2 TIMES DAILY PRN
Qty: 60 CAPSULE | Refills: 0 | Status: SHIPPED | OUTPATIENT
Start: 2020-11-03 | End: 2020-12-28 | Stop reason: SDUPTHER

## 2020-11-14 ENCOUNTER — TELEMEDICINE (OUTPATIENT)
Dept: FAMILY MEDICINE CLINIC | Facility: CLINIC | Age: 55
End: 2020-11-14
Payer: COMMERCIAL

## 2020-11-14 DIAGNOSIS — J01.00 ACUTE NON-RECURRENT MAXILLARY SINUSITIS: Primary | ICD-10-CM

## 2020-11-14 PROCEDURE — 99213 OFFICE O/P EST LOW 20 MIN: CPT | Performed by: FAMILY MEDICINE

## 2020-11-14 RX ORDER — AMOXICILLIN AND CLAVULANATE POTASSIUM 875; 125 MG/1; MG/1
1 TABLET, FILM COATED ORAL EVERY 12 HOURS SCHEDULED
Qty: 20 TABLET | Refills: 0 | Status: SHIPPED | OUTPATIENT
Start: 2020-11-14 | End: 2020-11-24

## 2020-11-17 ENCOUNTER — TELEPHONE (OUTPATIENT)
Dept: FAMILY MEDICINE CLINIC | Facility: CLINIC | Age: 55
End: 2020-11-17

## 2020-12-28 DIAGNOSIS — G89.4 CHRONIC PAIN SYNDROME: ICD-10-CM

## 2020-12-28 DIAGNOSIS — M50.122 CERVICAL DISC DISORDER AT C5-C6 LEVEL WITH RADICULOPATHY: ICD-10-CM

## 2020-12-28 RX ORDER — GABAPENTIN 300 MG/1
300 CAPSULE ORAL 2 TIMES DAILY PRN
Qty: 60 CAPSULE | Refills: 2 | Status: SHIPPED | OUTPATIENT
Start: 2020-12-28 | End: 2021-04-29 | Stop reason: SDUPTHER

## 2020-12-30 ENCOUNTER — IMMUNIZATIONS (OUTPATIENT)
Dept: FAMILY MEDICINE CLINIC | Facility: HOSPITAL | Age: 55
End: 2020-12-30

## 2020-12-30 DIAGNOSIS — Z23 ENCOUNTER FOR IMMUNIZATION: ICD-10-CM

## 2020-12-30 PROCEDURE — 91301 SARS-COV-2 / COVID-19 MRNA VACCINE (MODERNA) 100 MCG: CPT

## 2020-12-30 PROCEDURE — 0011A SARS-COV-2 / COVID-19 MRNA VACCINE (MODERNA) 100 MCG: CPT

## 2021-01-25 ENCOUNTER — IMMUNIZATIONS (OUTPATIENT)
Dept: FAMILY MEDICINE CLINIC | Facility: HOSPITAL | Age: 56
End: 2021-01-25

## 2021-01-25 DIAGNOSIS — Z23 ENCOUNTER FOR IMMUNIZATION: Primary | ICD-10-CM

## 2021-01-25 PROCEDURE — 0012A SARS-COV-2 / COVID-19 MRNA VACCINE (MODERNA) 100 MCG: CPT

## 2021-01-25 PROCEDURE — 91301 SARS-COV-2 / COVID-19 MRNA VACCINE (MODERNA) 100 MCG: CPT

## 2021-02-12 DIAGNOSIS — I10 ESSENTIAL HYPERTENSION: ICD-10-CM

## 2021-02-12 RX ORDER — LOSARTAN POTASSIUM AND HYDROCHLOROTHIAZIDE 12.5; 5 MG/1; MG/1
1 TABLET ORAL DAILY
Qty: 90 TABLET | Refills: 0 | Status: SHIPPED | OUTPATIENT
Start: 2021-02-12 | End: 2021-05-30 | Stop reason: SDUPTHER

## 2021-03-01 ENCOUNTER — OFFICE VISIT (OUTPATIENT)
Dept: FAMILY MEDICINE CLINIC | Facility: CLINIC | Age: 56
End: 2021-03-01
Payer: COMMERCIAL

## 2021-03-01 VITALS
TEMPERATURE: 98.3 F | HEART RATE: 84 BPM | RESPIRATION RATE: 16 BRPM | WEIGHT: 199 LBS | DIASTOLIC BLOOD PRESSURE: 78 MMHG | SYSTOLIC BLOOD PRESSURE: 128 MMHG | BODY MASS INDEX: 37.57 KG/M2 | HEIGHT: 61 IN

## 2021-03-01 DIAGNOSIS — M54.50 ACUTE MIDLINE LOW BACK PAIN WITHOUT SCIATICA: Primary | ICD-10-CM

## 2021-03-01 PROCEDURE — 99213 OFFICE O/P EST LOW 20 MIN: CPT | Performed by: NURSE PRACTITIONER

## 2021-03-01 RX ORDER — PREDNISONE 10 MG/1
TABLET ORAL
Qty: 20 TABLET | Refills: 0 | Status: SHIPPED | OUTPATIENT
Start: 2021-03-01 | End: 2021-03-11

## 2021-03-01 NOTE — PATIENT INSTRUCTIONS
Take prednisone with food in morning and do not take any NSAID's while taking prednisone  Supportive care discussed and advised  Advised to RTO for any worsening and no improvement  Follow up for no improvement and worsening of conditions  Patient advised and educated when to see immediate medical care  Prednisone (By mouth)   Prednisone (PRED-ni-sone)  Treats many diseases and conditions, especially problems related to inflammation  This medicine is a corticosteroid  Brand Name(s): Yoko, predniSONE Intensol   There may be other brand names for this medicine  When This Medicine Should Not Be Used: This medicine is not right for everyone  Do not use if you had an allergic reaction to prednisone or if you are pregnant  How to Use This Medicine:   Liquid, Tablet, Delayed Release Tablet  · Take your medicine as directed  Your dose may need to be changed several times to find what works best for you  · It is best to take this medicine with food or milk  · Swallow the delayed-release tablet whole  Do not crush, break, or chew it  · Measure the oral liquid medicine with a marked measuring spoon, oral syringe, or medicine cup  · Missed dose: Take a dose as soon as you remember  If it is almost time for your next dose, wait until then and take a regular dose  Do not take extra medicine to make up for a missed dose  · Store the medicine in a closed container at room temperature, away from heat, moisture, and direct light  Do not freeze the oral liquid  Drugs and Foods to Avoid:   Ask your doctor or pharmacist before using any other medicine, including over-the-counter medicines, vitamins, and herbal products  · Tell your doctor if you use any of the following:  ? Aminoglutethimide, amphotericin B, carbamazepine, cholestyramine, cyclosporine, digoxin, isoniazid, ketoconazole, phenobarbital, phenytoin, or rifampin  ? Blood thinner, such as warfarin  ?  NSAID pain or arthritis medicine, such as aspirin, diclofenac, ibuprofen, naproxen, celecoxib  ? Diuretic (water pill)  ? Diabetes medicine  ? Macrolide antibiotic, such as azithromycin, clarithromycin, erythromycin  ? Estrogen, including birth control pills or hormone replacement therapy  · This medicine may interfere with vaccines  Ask your doctor before you get a flu shot or any other vaccines  Warnings While Using This Medicine:   · It is not safe to take this medicine during pregnancy  It could harm an unborn baby  Tell your doctor right away if you become pregnant  · Tell your doctor if you are breastfeeding or if you have kidney problems, heart failure, high blood pressure, a recent heart attack, diabetes, glaucoma, osteoporosis, or thyroid problems  Tell your doctor about any infection you have  Also tell your doctor if you have had mental or emotional problems (such as depression) or stomach or bowel problems (such as an ulcer or diverticulitis)  · This medicine may cause the following problems:  ? Mood or behavior changes  ? Higher blood pressure, retaining water, changes in salt or potassium levels in your body  ? Cataracts or glaucoma (with long-term use)  ? Weak bones or osteoporosis (with long-term use)  ? Slow growth in children (with long-term use)  ? Muscle problems (with high doses, especially if you have myasthenia gravis or similar nerve and muscle problems)  · Do not stop using this medicine suddenly  Your doctor will need to slowly decrease your dose before you stop it completely  · This medicine could cause you to get infections more easily  Tell your doctor right away if you are exposed to chicken pox, measles, or other serious infection  Tell your doctor if you had a serious infection in the past, such as tuberculosis or herpes  · Tell your doctor about any extra stress or anxiety in your life  Your dose might need to be changed for a short time  · Tell any doctor or dentist who treats you that you are using this medicine   This medicine may affect certain medical test results  · Keep all medicine out of the reach of children  Never share your medicine with anyone  Possible Side Effects While Using This Medicine:   Call your doctor right away if you notice any of these side effects:  · Allergic reaction: Itching or hives, swelling in your face or hands, swelling or tingling in your mouth or throat, chest tightness, trouble breathing  · Dark freckles, skin color changes, coldness, weakness, tiredness, nausea, vomiting, weight loss  · Depression, unusual thoughts, feelings, or behaviors, trouble sleeping  · Fever, chills, cough, sore throat, and body aches  · Muscle pain or weakness  · Rapid weight gain, swelling in your hands, ankles, or feet  · Severe stomach pain, nausea, vomiting, or red or black stools  · Skin changes or growths  · Trouble seeing, eye pain, headache  If you notice these less serious side effects, talk with your doctor:   · Increased appetite  · Round, puffy face  · Weight gain around your neck, upper back, breast, face, or waist  If you notice other side effects that you think are caused by this medicine, tell your doctor  Call your doctor for medical advice about side effects  You may report side effects to FDA at 8-811-FDA-4253  © Copyright [address] Information is for End User's use only and may not be sold, redistributed or otherwise used for commercial purposes  The above information is an  only  It is not intended as medical advice for individual conditions or treatments  Talk to your doctor, nurse or pharmacist before following any medical regimen to see if it is safe and effective for you

## 2021-03-01 NOTE — PROGRESS NOTES
Assessment/Plan:    1  Acute midline low back pain without sciatica  -     predniSONE 10 mg tablet; 4 tabs x 2 days, 3 tabs x 2 days, 2 tabs x 2 days, 1 tab x 2 days            Patient Instructions: Take prednisone with food in morning and do not take any NSAID's while taking prednisone  Supportive care discussed and advised  Advised to RTO for any worsening and no improvement  Follow up for no improvement and worsening of conditions  Patient advised and educated when to see immediate medical care  Return if symptoms worsen or fail to improve  Future Appointments   Date Time Provider Jaylyn Paul   3/29/2021  6:40 AM [facility]           Subjective:      Patient ID: Ray Davis is a 60 y o  female  Chief Complaint   Patient presents with    Pain     lower back down to pelvis--lj         Vitals:  /78   Pulse 84   Temp 98 3 °F (36 8 °C)   Resp 16   Ht 5' 1" (1 549 m)   Wt 90 3 kg (199 lb)   BMI 37 60 kg/m²     HPI    Patient stated that having lower back pain from couple of days  Have been travelling long distances recently  Denies any pain radiation to legs and at times only feels in right buttock area  Denies any bowel and bladder incontinence  Tried mobic but not much benefit  Does not want muscle relaxants as does not do well with them  The following portions of the patient's history were reviewed and updated as appropriate: allergies, current medications, past family history, past medical history, past social history, past surgical history and problem list       Review of Systems   Constitutional: Negative  HENT: Negative  Respiratory: Negative  Cardiovascular: Negative  Gastrointestinal: Negative for abdominal pain, constipation and vomiting  Musculoskeletal: Positive for back pain  Skin: Negative for rash  Neurological: Negative for dizziness, light-headedness and headaches           Objective:    Social History Tobacco Use   Smoking Status Former Smoker    Packs/day: 1 00    Years:  00    Pack years: 9 00    Quit date: 12    Years since quittin 1   Smokeless Tobacco Never Used       Allergies: Allergies   Allergen Reactions    Banana GI Intolerance         Current Outpatient Medications   Medication Sig Dispense Refill    Calcium Carbonate-Vitamin D (CALCIUM 600+D HIGH POTENCY PO) Take by mouth daily      Cholecalciferol (VITAMIN D3) 5000 units CAPS Take by mouth daily      famotidine (PEPCID) 40 MG tablet Take 1 tablet by mouth daily as needed      Flaxseed, Linseed, (FLAX SEED OIL) 1000 MG CAPS Take 2 capsules by mouth daily       fluticasone (FLONASE) 50 mcg/act nasal spray 2 sprays into each nostril daily (Patient taking differently: 2 sprays into each nostril as needed ) 16 g 0    gabapentin (NEURONTIN) 300 mg capsule Take 1 capsule (300 mg total) by mouth 2 (two) times a day as needed (neuropathy) 60 capsule 2    ibuprofen (MOTRIN) 200 mg tablet Take by mouth every 6 (six) hours as needed for mild pain      losartan-hydrochlorothiazide (HYZAAR) 50-12 5 mg per tablet Take 1 tablet by mouth daily 90 tablet 0    naloxone (NARCAN) 4 mg/0 1 mL nasal spray Administer 1 spray into a nostril  If breathing does not return to normal or if breathing difficulty resumes after 2-3 minutes, give another dose in the other nostril using a new spray  1 each 1    predniSONE 10 mg tablet 4 tabs x 2 days, 3 tabs x 2 days, 2 tabs x 2 days, 1 tab x 2 days 20 tablet 0     No current facility-administered medications for this visit  Physical Exam  Constitutional:       Appearance: Normal appearance  HENT:      Head: Normocephalic and atraumatic  Nose: Nose normal    Eyes:      Conjunctiva/sclera: Conjunctivae normal    Cardiovascular:      Rate and Rhythm: Normal rate and regular rhythm  Pulses: Normal pulses  Heart sounds: Normal heart sounds     Pulmonary:      Effort: Pulmonary effort is normal       Breath sounds: Normal breath sounds  Musculoskeletal: Normal range of motion  General: Tenderness (tender on palpation on right lumbar paraspinal musculature) present  Skin:     General: Skin is warm and dry  Findings: No rash  Neurological:      Mental Status: She is alert and oriented to person, place, and time  Psychiatric:         Mood and Affect: Mood normal          Behavior: Behavior normal          Thought Content:  Thought content normal          Judgment: Judgment normal                      PAUL Haywood

## 2021-03-29 ENCOUNTER — TELEPHONE (OUTPATIENT)
Dept: FAMILY MEDICINE CLINIC | Facility: CLINIC | Age: 56
End: 2021-03-29

## 2021-03-29 ENCOUNTER — HOSPITAL ENCOUNTER (OUTPATIENT)
Dept: RADIOLOGY | Age: 56
Discharge: HOME | End: 2021-03-29
Payer: COMMERCIAL

## 2021-03-29 ENCOUNTER — APPOINTMENT (OUTPATIENT)
Dept: LAB | Age: 56
End: 2021-03-29
Payer: COMMERCIAL

## 2021-03-29 VITALS — BODY MASS INDEX: 37.57 KG/M2 | HEIGHT: 61 IN | WEIGHT: 199 LBS

## 2021-03-29 DIAGNOSIS — E78.2 HYPERLIPEMIA, MIXED: ICD-10-CM

## 2021-03-29 DIAGNOSIS — R92.2 DENSE BREAST TISSUE: ICD-10-CM

## 2021-03-29 DIAGNOSIS — Z12.31 ENCOUNTER FOR SCREENING MAMMOGRAM FOR MALIGNANT NEOPLASM OF BREAST: ICD-10-CM

## 2021-03-29 DIAGNOSIS — I10 ESSENTIAL HYPERTENSION: ICD-10-CM

## 2021-03-29 DIAGNOSIS — Z12.39 SCREENING FOR MALIGNANT NEOPLASM OF BREAST: ICD-10-CM

## 2021-03-29 LAB
ALBUMIN SERPL BCP-MCNC: 3.8 G/DL (ref 3.5–5)
ALP SERPL-CCNC: 64 U/L (ref 46–116)
ALT SERPL W P-5'-P-CCNC: 92 U/L (ref 12–78)
ANION GAP SERPL CALCULATED.3IONS-SCNC: 8 MMOL/L (ref 4–13)
AST SERPL W P-5'-P-CCNC: 46 U/L (ref 5–45)
BILIRUB SERPL-MCNC: 0.79 MG/DL (ref 0.2–1)
BUN SERPL-MCNC: 10 MG/DL (ref 5–25)
CALCIUM SERPL-MCNC: 9 MG/DL (ref 8.3–10.1)
CHLORIDE SERPL-SCNC: 111 MMOL/L (ref 100–108)
CHOLEST SERPL-MCNC: 211 MG/DL (ref 50–200)
CO2 SERPL-SCNC: 26 MMOL/L (ref 21–32)
CREAT SERPL-MCNC: 0.8 MG/DL (ref 0.6–1.3)
GFR SERPL CREATININE-BSD FRML MDRD: 82 ML/MIN/1.73SQ M
GLUCOSE P FAST SERPL-MCNC: 98 MG/DL (ref 65–99)
HDLC SERPL-MCNC: 39 MG/DL
LDLC SERPL CALC-MCNC: 115 MG/DL (ref 0–100)
POTASSIUM SERPL-SCNC: 3.7 MMOL/L (ref 3.5–5.3)
PROT SERPL-MCNC: 7.3 G/DL (ref 6.4–8.2)
SODIUM SERPL-SCNC: 145 MMOL/L (ref 136–145)
TRIGL SERPL-MCNC: 284 MG/DL

## 2021-03-29 PROCEDURE — 80053 COMPREHEN METABOLIC PANEL: CPT

## 2021-03-29 PROCEDURE — 77067 SCR MAMMO BI INCL CAD: CPT

## 2021-03-29 PROCEDURE — 36415 COLL VENOUS BLD VENIPUNCTURE: CPT

## 2021-03-29 PROCEDURE — 80061 LIPID PANEL: CPT

## 2021-03-29 PROCEDURE — 77063 BREAST TOMOSYNTHESIS BI: CPT

## 2021-03-31 ENCOUNTER — TELEMEDICINE (OUTPATIENT)
Dept: FAMILY MEDICINE CLINIC | Facility: CLINIC | Age: 56
End: 2021-03-31
Payer: COMMERCIAL

## 2021-03-31 DIAGNOSIS — E78.2 HYPERLIPEMIA, MIXED: Primary | ICD-10-CM

## 2021-03-31 DIAGNOSIS — T46.6X5A ADVERSE REACTION TO STATIN MEDICATION: ICD-10-CM

## 2021-03-31 DIAGNOSIS — R79.89 ELEVATED LIVER FUNCTION TESTS: ICD-10-CM

## 2021-03-31 PROCEDURE — 99213 OFFICE O/P EST LOW 20 MIN: CPT | Performed by: FAMILY MEDICINE

## 2021-03-31 NOTE — PROGRESS NOTES
Virtual Regular Visit      Assessment/Plan:    Problem List Items Addressed This Visit        Other    Adverse reaction to statin medication    Relevant Orders    Comprehensive metabolic panel    Lipid Panel with Direct LDL reflex    Hyperlipemia, mixed - Primary    Relevant Orders    Comprehensive metabolic panel    Lipid Panel with Direct LDL reflex      Other Visit Diagnoses     Elevated liver function tests        Relevant Orders    US liver    Comprehensive metabolic panel    Lipid Panel with Direct LDL reflex               Reason for visit is   Chief Complaint   Patient presents with    Virtual Regular Visit        Encounter provider Carmelo Hui DO    Provider located at [address] [address]      Recent Visits  Date Type Provider Dept   03/29/21 Telephone Carmelo Hui, [address] recent visits within past 7 days and meeting all other requirements     Today's Visits  Date Type Provider Dept   03/31/21 [address] [facility], [address] today's visits and meeting all other requirements     Future Appointments  No visits were found meeting these conditions  Showing future appointments within next 150 days and meeting all other requirements        The patient was identified by name and date of birth  Ham Portillo was informed that this is a telemedicine visit and that the visit is being conducted through [employer] and patient was informed that this is not a secure, HIPAA-compliant platform  She agrees to proceed     My office door was closed  No one else was in the room  She acknowledged consent and understanding of privacy and security of the video platform  The patient has agreed to participate and understands they can discontinue the visit at any time  Patient is aware this is a billable service  Subjective  Ham Portillo is a 60 y o  female          Pt is being seen for a virtual appt to discuss labs  Pt denies CP, no sob    Pt states she will go on lipid meds she is fine then she gets joint pain  Pt states these labs are better than they were in the past       Past Medical History:   Diagnosis Date    Abscess of back     Resolved 1/11/2018     Adverse reaction to statin medication     Resolved 1/11/2018     Alcoholism ([address] )     AGE 27    Anesthesia complication     extreme dizziness upon awakening    Anxiety     Chronic pain disorder      rt side pinched nerve    Depression     Edema     GERD (gastroesophageal reflux disease)     Hyperlipidemia     Hypertension     Irregular heart beat     palpitations    Irritable bowel syndrome     Liver disease     fatty    Neck pain     Pinched nerve     Last assessed 9/26/2017     PONV (postoperative nausea and vomiting)     Tuberculosis     1986       Past Surgical History:   Procedure Laterality Date    CARDIAC CATHETERIZATION      due to brothers advanced heart disease and abnormal stress test    COLONOSCOPY N/A 12/6/2017    Procedure: COLONOSCOPY;  Surgeon: Rhys Hitchcock MD;  Location: [place] [address] GI LAB; Service: Gastroenterology    COLONOSCOPY  12    ELBOW SURGERY Right     tendon repair, post op nausea and dizziness    ESOPHAGOGASTRODUODENOSCOPY N/A 12/6/2017    Procedure: ESOPHAGOGASTRODUODENOSCOPY (EGD); Surgeon: Rhys Hitchcock MD;  [facility]: [facility];   Service: Gastroenterology    ESOPHAGOGASTRODUODENOSCOPY  1986    Diagnostic     SKIN BIOPSY      Last assessed 9/28/2017     WISDOM TOOTH EXTRACTION         Current Outpatient Medications   Medication Sig Dispense Refill    Calcium Carbonate-Vitamin D (CALCIUM 600+D HIGH POTENCY PO) Take by mouth daily      Cholecalciferol (VITAMIN D3) 5000 units CAPS Take by mouth daily      famotidine (PEPCID) 40 MG tablet Take 1 tablet by mouth daily as needed      Flaxseed, Linseed, (FLAX SEED OIL) 1000 MG CAPS Take 2 capsules by mouth daily       fluticasone (FLONASE) 50 mcg/act nasal spray 2 sprays into each nostril daily (Patient taking differently: 2 sprays into each nostril as needed ) 16 g 0    gabapentin (NEURONTIN) 300 mg capsule Take 1 capsule (300 mg total) by mouth 2 (two) times a day as needed (neuropathy) 60 capsule 2    ibuprofen (MOTRIN) 200 mg tablet Take by mouth every 6 (six) hours as needed for mild pain      losartan-hydrochlorothiazide (HYZAAR) 50-12 5 mg per tablet Take 1 tablet by mouth daily 90 tablet 0    naloxone (NARCAN) 4 mg/0 1 mL nasal spray Administer 1 spray into a nostril  If breathing does not return to normal or if breathing difficulty resumes after 2-3 minutes, give another dose in the other nostril using a new spray  1 each 1     No current facility-administered medications for this visit  Allergies   Allergen Reactions    Banana - Food Allergy GI Intolerance    Statins Arthralgia       Review of Systems   Constitutional: Negative  Negative for activity change, appetite change, chills, diaphoresis and fatigue  HENT: Negative  Negative for dental problem, ear pain, sinus pressure and sore throat  Eyes: Negative  Negative for photophobia, pain, discharge, redness, itching and visual disturbance  Respiratory: Negative for apnea and chest tightness  Cardiovascular: Negative  Negative for chest pain, palpitations and leg swelling  Gastrointestinal: Negative  Negative for abdominal distention, abdominal pain, constipation and diarrhea  Endocrine: Negative  Negative for cold intolerance and heat intolerance  Genitourinary: Negative  Negative for difficulty urinating and dyspareunia  Musculoskeletal: Negative  Negative for arthralgias and back pain  Skin: Negative  Allergic/Immunologic: Negative for environmental allergies  Neurological: Negative  Negative for dizziness  Psychiatric/Behavioral: Negative  Negative for agitation         Video Exam    There were no vitals filed for this visit     Physical Exam  Vitals signs reviewed  Constitutional:       General: She is not in acute distress  Appearance: She is well-developed  She is not diaphoretic  HENT:      Head: Normocephalic and atraumatic  Eyes:      General:         Right eye: No discharge  Left eye: No discharge  Conjunctiva/sclera: Conjunctivae normal    Neck:      Musculoskeletal: Normal range of motion  Pulmonary:      Effort: Pulmonary effort is normal  No respiratory distress  I spent 10 minutes directly with the patient during this visit    Recent Results (from the past 672 hour(s))   Comprehensive metabolic panel    Collection Time: 03/29/21  7:14 AM   Result Value Ref Range    Sodium 145 136 - 145 mmol/L    Potassium 3 7 3 5 - 5 3 mmol/L    Chloride 111 (H) 100 - 108 mmol/L    CO2 26 21 - 32 mmol/L    ANION GAP 8 4 - 13 mmol/L    BUN 10 5 - 25 mg/dL    Creatinine 0 80 0 60 - 1 30 mg/dL    Glucose, Fasting 98 65 - 99 mg/dL    Calcium 9 0 8 3 - 10 1 mg/dL    AST 46 (H) 5 - 45 U/L    ALT 92 (H) 12 - 78 U/L    Alkaline Phosphatase 64 46 - 116 U/L    Total Protein 7 3 6 4 - 8 2 g/dL    Albumin 3 8 3 5 - 5 0 g/dL    Total Bilirubin 0 79 0 20 - 1 00 mg/dL    eGFR 82 ml/min/1 73sq m   Lipid Panel with Direct LDL reflex    Collection Time: 03/29/21  7:14 AM   Result Value Ref Range    Cholesterol 211 (H) 50 - 200 mg/dL    Triglycerides 284 (H) <=150 mg/dL    HDL, Direct 39 (L) >=40 mg/dL    LDL Calculated 115 (H) 0 - 100 mg/dL       VIRTUAL VISIT DISCLAIMER    Dimas Arguello acknowledges that she has consented to an online visit or consultation  She understands that the online visit is based solely on information provided by her, and that, in the absence of a face-to-face physical evaluation by the physician, the diagnosis she receives is both limited and provisional in terms of accuracy and completeness  This is not intended to replace a full medical face-to-face evaluation by the physician   Bassam Shepard Astrid understands and accepts these terms

## 2021-04-29 DIAGNOSIS — G89.4 CHRONIC PAIN SYNDROME: ICD-10-CM

## 2021-04-29 DIAGNOSIS — M50.122 CERVICAL DISC DISORDER AT C5-C6 LEVEL WITH RADICULOPATHY: ICD-10-CM

## 2021-04-29 RX ORDER — GABAPENTIN 300 MG/1
300 CAPSULE ORAL 2 TIMES DAILY PRN
Qty: 60 CAPSULE | Refills: 0 | Status: SHIPPED | OUTPATIENT
Start: 2021-04-29 | End: 2021-05-17

## 2021-04-29 NOTE — TELEPHONE ENCOUNTER
[facility],     I have scheduled a follow up appointment with you in May, she has not been seen since 6/30/20  Please refill       Thanks, Prabhjot Blanchard

## 2021-05-17 ENCOUNTER — OFFICE VISIT (OUTPATIENT)
Dept: NEUROLOGY | Facility: CLINIC | Age: 56
End: 2021-05-17
Payer: COMMERCIAL

## 2021-05-17 VITALS
BODY MASS INDEX: 35.5 KG/M2 | DIASTOLIC BLOOD PRESSURE: 86 MMHG | SYSTOLIC BLOOD PRESSURE: 139 MMHG | WEIGHT: 188 LBS | HEART RATE: 71 BPM | HEIGHT: 61 IN

## 2021-05-17 DIAGNOSIS — M54.6 THORACIC SPINE PAIN: Primary | ICD-10-CM

## 2021-05-17 DIAGNOSIS — G89.4 CHRONIC PAIN SYNDROME: ICD-10-CM

## 2021-05-17 DIAGNOSIS — M50.122 CERVICAL DISC DISORDER AT C5-C6 LEVEL WITH RADICULOPATHY: ICD-10-CM

## 2021-05-17 PROCEDURE — 99214 OFFICE O/P EST MOD 30 MIN: CPT | Performed by: NURSE PRACTITIONER

## 2021-05-17 RX ORDER — GABAPENTIN 300 MG/1
300 CAPSULE ORAL 3 TIMES DAILY
Qty: 90 CAPSULE | Refills: 3 | Status: SHIPPED | OUTPATIENT
Start: 2021-05-17 | End: 2021-12-16 | Stop reason: SDUPTHER

## 2021-05-17 NOTE — PATIENT INSTRUCTIONS
Increase Gabapentin to 300mg three times a day  Thoracic Spine XR 3 vw  PT referral once XR completed  Follow up with Pain Management re:  Thoracic spine and XR review  Follow up with outpatient Neurology if 4months

## 2021-05-17 NOTE — PROGRESS NOTES
Patient ID: Lola Crenshaw is a 60 y o  female  Assessment/Plan:     Diagnoses and all orders for this visit:    Thoracic spine pain  -     Ambulatory referral to Physical Therapy; Future  -     XR spine thoracic 3 vw; Future    Cervical disc disorder at C5-C6 level with radiculopathy  -     gabapentin (NEURONTIN) 300 mg capsule; Take 1 capsule (300 mg total) by mouth 3 (three) times a day    Chronic pain syndrome  -     gabapentin (NEURONTIN) 300 mg capsule; Take 1 capsule (300 mg total) by mouth 3 (three) times a day     Increase Gabapentin to 300mg three times a day  Thoracic Spine XR 3 vw  PT referral once XR completed  Follow up with Pain Management re: Thoracic spine and XR review  Follow up with outpatient Neurology if 4months     Subjective/HPI:  Lola Crenshaw is a 60year old female who follows with outpatient neurology for medical management of mid to lower back pain  The patient last seen by Dr Martín Pablo in June of 2020, had noticed at that time approximately 6 months of bilateral T5-6 distribution ribcage pain  Patient did bring her CT of Thoracic Spine showing some multilevel disc denerative disease with calcification of ligamentum flavum in the mid thoracic spine, as of 2017  Pt has not had any further studies to the Thoracic spine  MRI of the brain done previously showing chronic small-vessel disease vs white matter lesions that could be demyelinating  MRI of the cervical spine revealed C5-6 disc bulge with encroachment and flattening of the ventral margin of the cord accompanying endplate discogenic changes and disc space narrowing  For this patient was seen by pain management and recently had epidural injection, which she reports helped after about a week  Patient reports the office today, reports having ongoing bouts of sudden shooting pain about the same area in her lower thoracic spine around to the base of her ribcage, bilaterally    Patient notes that the pain is 12/10 and that the muscles spasm, causing her more pain as she tenses up  She also reports numbness in the Lt fingers and had an RAHEEM of the cervical spine, noted that this has helped with those symptoms  Pt noted that she occasionally gets some throbbing pain in her feet when she is on her feet all day and noted that when she lays down is when they throb  She is increasing her activity levels and has asked to do PT  She had gotten Valium in the past which did help and noted that she had meloxicam in the past and she could not take them long term due to elevated LFTs  Pt takes Gabapentin 300mg  2 times a day as needed  Pt stated that she takes it in the morning and sometimes at night  She feels that it may help a little bit  She can increase to 300mg tid  Patient has not had any further thoracic spine studies since 2017, as noted having increased pain over the past year, is now progressing into chronic  Prior scan showed thoracic disc degeneration, will evaluate with thoracic x-rays for any additional visible changes  Once x-rays receive, patient can start in physical therapy for strengthening, stretching and pain  Patient will increase her gabapentin to 300 mg t i d  and follow up with outpatient Neurology in 4 months  Can consider use of baclofen 10 mg b i d  for long-term use for muscle spasming, as this is cleared by the kidneys        The following portions of the patient's history were reviewed and updated as appropriate: allergies, current medications, past family history, past medical history, past social history, past surgical history and problem list         Past Medical History:   Diagnosis Date    Abscess of back     Resolved 1/11/2018     Adverse reaction to statin medication     Resolved 1/11/2018     Alcoholism ([facility] [address] )     AGE 27    Anesthesia complication     extreme dizziness upon awakening    Anxiety     Chronic pain disorder      rt side pinched nerve    Depression     Edema     GERD (gastroesophageal reflux disease)     Hyperlipidemia     Hypertension     Irregular heart beat     palpitations    Irritable bowel syndrome     Liver disease     fatty    Neck pain     Pinched nerve     Last assessed 2017     PONV (postoperative nausea and vomiting)     Tuberculosis            Past Surgical History:   Procedure Laterality Date    CARDIAC CATHETERIZATION      due to brothers advanced heart disease and abnormal stress test    COLONOSCOPY N/A 2017    Procedure: COLONOSCOPY;  Surgeon: Zaheer Merida MD;  Location: [facility]; Service: Gastroenterology    COLONOSCOPY  12    ELBOW SURGERY Right     tendon repair, post op nausea and dizziness    ESOPHAGOGASTRODUODENOSCOPY N/A 2017    Procedure: ESOPHAGOGASTRODUODENOSCOPY (EGD); Surgeon: Zaheer Merida MD;  Location: [facility];   Service: Gastroenterology    ESOPHAGOGASTRODUODENOSCOPY      Diagnostic     SKIN BIOPSY      Last assessed 2017     WISDOM TOOTH EXTRACTION         Social History     Socioeconomic History    Marital status: Single     Spouse name: None    Number of children: None    Years of education: None    Highest education level: None   Occupational History    None   Social Needs    Financial resource strain: None    Food insecurity     Worry: None     Inability: None    Transportation needs     Medical: None     Non-medical: None   Tobacco Use    Smoking status: Former Smoker     Packs/day: 1 00     Years: 9 00     Pack years: 9 00     Quit date:      Years since quittin 3    Smokeless tobacco: Never Used   Substance and Sexual Activity    Alcohol use: No     Comment: 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    Drug use: No    Sexual activity: Not Currently   Lifestyle    Physical activity     Days per week: None     Minutes per session: None    Stress: None   Relationships    Social connections     Talks on phone: None     Gets together: None     Attends Scientologist service: None     Active member of club or organization: None     Attends meetings of clubs or organizations: None     Relationship status: None    Intimate partner violence     Fear of current or ex partner: None     Emotionally abused: None     Physically abused: None     Forced sexual activity: None   Other Topics Concern    None   Social History Narrative    None       Family History   Problem Relation Age of Onset    Alzheimer's disease Mother     Thyroid disease Mother     Edema Mother     Osteoarthritis Mother         Knee    Stroke Father     Hypertension Father     Hyperlipidemia Father     Arthritis Father     Coronary artery disease Father     Hypothyroidism Father     Other Father         status post coronary artery bypass graft     Stroke Sister     Heart failure Sister     Heart disease Sister     Drug abuse Brother     Completed Suicide  Brother     No Known Problems Maternal Grandmother     No Known Problems Maternal Grandfather     No Known Problems Paternal Grandmother     No Known Problems Paternal Grandfather     Breast cancer Paternal Aunt 48    Colon cancer Maternal Uncle 76    No Known Problems Maternal Aunt     No Known Problems Maternal Aunt          Current Outpatient Medications:     Calcium Carbonate-Vitamin D (CALCIUM 600+D HIGH POTENCY PO), Take by mouth daily, Disp: , Rfl:     Cholecalciferol (VITAMIN D3) 5000 units CAPS, Take by mouth daily, Disp: , Rfl:     famotidine (PEPCID) 40 MG tablet, Take 1 tablet by mouth daily as needed, Disp: , Rfl:     Flaxseed, Linseed, (FLAX SEED OIL) 1000 MG CAPS, Take 2 capsules by mouth daily , Disp: , Rfl:     fluticasone (FLONASE) 50 mcg/act nasal spray, 2 sprays into each nostril daily (Patient taking differently: 2 sprays into each nostril as needed ), Disp: 16 g, Rfl: 0    gabapentin (NEURONTIN) 300 mg capsule, Take 1 capsule (300 mg total) by mouth 3 (three) times a day, Disp: 90 capsule, Rfl: 3    ibuprofen (MOTRIN) 200 mg tablet, Take by mouth every 6 (six) hours as needed for mild pain, Disp: , Rfl:     losartan-hydrochlorothiazide (HYZAAR) 50-12 5 mg per tablet, Take 1 tablet by mouth daily, Disp: 90 tablet, Rfl: 0    naloxone (NARCAN) 4 mg/0 1 mL nasal spray, Administer 1 spray into a nostril  If breathing does not return to normal or if breathing difficulty resumes after 2-3 minutes, give another dose in the other nostril using a new spray  (Patient not taking: Reported on 5/17/2021), Disp: 1 each, Rfl: 1    Allergies   Allergen Reactions    Banana - Food Allergy GI Intolerance    Statins Arthralgia        Blood pressure 139/86, pulse 71, height 5' 1" (1 549 m), weight 85 3 kg (188 lb), not currently breastfeeding  Objective:    Blood pressure 139/86, pulse 71, height 5' 1" (1 549 m), weight 85 3 kg (188 lb), not currently breastfeeding  Physical Exam  Vitals signs reviewed  Constitutional:       Appearance: She is well-developed  HENT:      Head: Normocephalic  Nose: Nose normal       Mouth/Throat:      Mouth: Mucous membranes are moist    Eyes:      General: Lids are normal       Extraocular Movements: Extraocular movements intact  Pupils: Pupils are equal, round, and reactive to light  Neck:      Musculoskeletal: Normal range of motion  Cardiovascular:      Rate and Rhythm: Normal rate  Pulmonary:      Effort: Pulmonary effort is normal    Abdominal:      Palpations: Abdomen is soft  Skin:     General: Skin is warm and dry  Neurological:      Mental Status: She is alert  Coordination: Romberg sign negative  Deep Tendon Reflexes:      Reflex Scores:       Bicep reflexes are 1+ on the right side and 1+ on the left side  Brachioradialis reflexes are 1+ on the right side and 1+ on the left side  Patellar reflexes are 1+ on the right side and 1+ on the left side  Achilles reflexes are 1+ on the right side and 1+ on the left side    Psychiatric: Speech: Speech normal          Behavior: Behavior normal          Thought Content: Thought content normal          Judgment: Judgment normal          Neurological Exam  Mental Status  Alert  Oriented to person, place, time and situation  Recent and remote memory are intact  Speech is normal  Follows three-step commands  Attention and concentration are normal  Fund of knowledge is appropriate for level of education  Cranial Nerves  CN II: Visual acuity is normal  Visual fields full to confrontation  CN III, IV, VI: Extraocular movements intact bilaterally  Normal lids and orbits bilaterally  Pupils equal round and reactive to light bilaterally  CN V: Facial sensation is normal   CN VII: Full and symmetric facial movement  CN VIII: Hearing is normal   CN IX, X: Palate elevates symmetrically  Normal gag reflex  CN XI: Shoulder shrug strength is normal   CN XII: Tongue midline without atrophy or fasciculations  Motor  Normal muscle bulk throughout  Normal muscle tone  No abnormal involuntary movements  Strength is 5/5 in all four extremities except as noted  Right                     Left   Iliopsoas                                                       4   Quadriceps                                                   4+   Hamstring                                                     4+    Sensory  Light touch is normal in upper and lower extremities  Temperature is normal in upper and lower extremities  Vibration is normal in upper and lower extremities  Proprioception is normal in upper and lower extremities       Reflexes                                           Right                      Left  Brachioradialis                    1+                         1+  Biceps                                 1+                         1+  Hamstring                            1+                         1+  Patellar                                1+ 1+  Achilles                                1+                         1+    Right pathological reflexes: Zeinab's absent  Left pathological reflexes: Zeinab's absent  Coordination  Right: Finger-to-nose normal  Rapid alternating movement normal  Heel-to-shin normal   Left: Finger-to-nose normal  Rapid alternating movement normal  Heel-to-shin normal     Gait  Casual gait is normal including stance, stride, and arm swing  Normal toe walking  Normal heel walking  Normal tandem gait  Romberg is absent  Able to rise from chair without using arms  ROS:    Review of Systems   Constitutional: Negative  Negative for appetite change and fever  HENT: Negative  Negative for hearing loss, tinnitus, trouble swallowing and voice change  Eyes: Negative  Negative for photophobia and pain  Respiratory: Negative  Negative for shortness of breath  Cardiovascular: Negative  Negative for palpitations  Gastrointestinal: Negative  Negative for nausea and vomiting  Endocrine: Negative  Negative for cold intolerance  Genitourinary: Negative  Negative for dysuria, frequency and urgency  Musculoskeletal: Positive for back pain (tightness/pain 12/10 pain patient states  feels like it is in her ribs)  Negative for myalgias and neck pain  Foot pain   Skin: Negative  Negative for rash  Neurological: Negative  Negative for dizziness, tremors, seizures, syncope, facial asymmetry, speech difficulty, weakness, light-headedness, numbness and headaches  Hematological: Negative  Does not bruise/bleed easily  Psychiatric/Behavioral: Negative  Negative for confusion, hallucinations and sleep disturbance  ROS reviewed and discussed with patient

## 2021-05-30 ENCOUNTER — HOSPITAL ENCOUNTER (OUTPATIENT)
Dept: RADIOLOGY | Facility: HOSPITAL | Age: 56
Discharge: HOME | End: 2021-05-30
Payer: COMMERCIAL

## 2021-05-30 DIAGNOSIS — I10 ESSENTIAL HYPERTENSION: ICD-10-CM

## 2021-05-30 DIAGNOSIS — M54.6 THORACIC SPINE PAIN: ICD-10-CM

## 2021-05-30 DIAGNOSIS — R79.89 ELEVATED LIVER FUNCTION TESTS: ICD-10-CM

## 2021-05-30 PROCEDURE — 72072 X-RAY EXAM THORAC SPINE 3VWS: CPT

## 2021-05-30 PROCEDURE — 76705 ECHO EXAM OF ABDOMEN: CPT

## 2021-06-01 RX ORDER — LOSARTAN POTASSIUM AND HYDROCHLOROTHIAZIDE 12.5; 5 MG/1; MG/1
1 TABLET ORAL DAILY
Qty: 90 TABLET | Refills: 0 | Status: SHIPPED | OUTPATIENT
Start: 2021-06-01 | End: 2021-08-30 | Stop reason: SDUPTHER

## 2021-06-02 ENCOUNTER — TELEPHONE (OUTPATIENT)
Dept: NEUROLOGY | Facility: CLINIC | Age: 56
End: 2021-06-02

## 2021-06-02 NOTE — TELEPHONE ENCOUNTER
----- Message from Cristy Daley, [address] sent at 6/1/2021  5:59 PM EDT -----  Please call the patient regarding her result  No evidence of fracture or dislocation  Some mild disc degeneration noted  Pt can initiate PT and follow up with Pain management re: additional needs for studies and treatment      Thanks   ilan

## 2021-06-07 ENCOUNTER — TELEPHONE (OUTPATIENT)
Dept: FAMILY MEDICINE CLINIC | Facility: CLINIC | Age: 56
End: 2021-06-07

## 2021-06-07 PROBLEM — K76.0 HEPATIC STEATOSIS: Status: ACTIVE | Noted: 2021-06-07

## 2021-06-07 NOTE — TELEPHONE ENCOUNTER
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

## 2021-06-09 ENCOUNTER — EVALUATION (OUTPATIENT)
Dept: PHYSICAL THERAPY | Facility: OTHER | Age: 56
End: 2021-06-09
Payer: COMMERCIAL

## 2021-06-09 DIAGNOSIS — M54.6 THORACIC SPINE PAIN: ICD-10-CM

## 2021-06-09 PROCEDURE — 97110 THERAPEUTIC EXERCISES: CPT | Performed by: PHYSICAL THERAPIST

## 2021-06-09 PROCEDURE — 97140 MANUAL THERAPY 1/> REGIONS: CPT | Performed by: PHYSICAL THERAPIST

## 2021-06-09 NOTE — PROGRESS NOTES
PT Evaluation     Today's date: 2021  Patient name: Abilio Narayanan  : 1965  MRN: [id number]  Referring provider: PAUL Law  Dx: No diagnosis found  Assessment  Assessment details: Abilio Narayanan is a pleasant 60 y o  female who presents with T-S pain  Intermitite  She will get mms spasms at time  Sharp pain will be brief  In the AM she does have some stiffness and soreness  About 1 hour to lossen up on the R side of the L-S  She reported alow level constand pain in the T-S     sharp pain several times a day  She would like to be able to exercise more  She would like to do some online classes  She does take gabapetin HEP issued and POC reviewed  No N/T     Impairments: abnormal coordination, abnormal muscle firing, abnormal or restricted ROM, activity intolerance, impaired physical strength, lacks appropriate home exercise program, pain with function and poor body mechanics    Symptom irritability: moderateUnderstanding of Dx/Px/POC: good   Prognosis: good    Goals  Short Term:  Pt will report decreased levels of pain by at least 2 subjective ratings in 4 weeks  Pt will demonstrate improved ROM by at least 10 degrees in 4 weeks  Pt will demonstrate improved strength by 1/2 grade  Long Term:   Pt will be independent in their HEP in 8 weeks  Pt will be be pain free with IADL's  Pt will demonstrate improved FOTO, > 80         Plan  Plan details: Prognosis above is given PT services 2x/week tapering to 1x/week over the next 3 months and home program adherence    Patient would benefit from: skilled physical therapy  Planned modality interventions: thermotherapy: hydrocollator packs  Planned therapy interventions: activity modification, joint mobilization, manual therapy, motor coordination training, neuromuscular re-education, patient education, self care, therapeutic activities, therapeutic exercise, graded activity and home exercise program  Frequency: 2x week  Treatment plan discussed with: patient        Subjective Evaluation    Pain  At best pain ratin  At worst pain rating: 10    Patient Goals  Patient goals for therapy: decreased pain, independence with ADLs/IADLs, return to sport/leisure activities, increased motion and increased strength          Objective     General Comments:      Cervical/Thoracic Comments  Special test  TTP eccetro spinea R sided  Hip/SI joint:   scour=   -    ally = -    capsular mobility= -          l    piriformis test= tightness            P4 test=   -          Gaenslen's test= -       Distraction=    -          Active SLR= -           Active SLR with stabilization =     -       Leg length = -              Sacral Thrust=-   gi finger= -  S/L si joint compression=-         ests:  SLR =-   slump= PA mob=   hypomobility T-S   Repetitive testing: extension  = -   flexion    = -    Pain with rotaiton and stiffness  Limited ROM T-S rotation and ext moderate                    Precautions:       Manuals             grd 5 prone                                                     Neuro Re-Ed                                                                                                        Ther Ex             Rhomboid stretch              T-S ext              Piriformis stretch                                                                               Ther Activity                                       Gait Training                                       Modalities

## 2021-06-10 PROCEDURE — 97161 PT EVAL LOW COMPLEX 20 MIN: CPT | Performed by: PHYSICAL THERAPIST

## 2021-06-14 ENCOUNTER — OFFICE VISIT (OUTPATIENT)
Dept: PHYSICAL THERAPY | Facility: OTHER | Age: 56
End: 2021-06-14
Payer: COMMERCIAL

## 2021-06-14 DIAGNOSIS — M54.6 THORACIC SPINE PAIN: Primary | ICD-10-CM

## 2021-06-14 PROCEDURE — 97110 THERAPEUTIC EXERCISES: CPT | Performed by: PHYSICAL THERAPIST

## 2021-06-14 PROCEDURE — 97140 MANUAL THERAPY 1/> REGIONS: CPT | Performed by: PHYSICAL THERAPIST

## 2021-06-14 NOTE — PROGRESS NOTES
Daily Note     Today's date: 2021  Patient name: James Crockett  : 1965  MRN: [id number]  Referring provider: PAUL Martínez  Dx: No diagnosis found  Subjective: patent reportted that se has noticed a trend of her pain increasing with prlonged sittinf  Stiffness has increased with this as well  Pain reducines when she gets to take breaks       Objective: See treatment diary below      Assessment: Tolerated treatment well  Patient demonstrated fatigue post treatment      Plan: Continue per plan of care  Precautions:       Manuals 6 14 21            grd 5 prone  Gr 4 MJ                          ISTM paraspinals  MJ                          Neuro Re-Ed             TB row/lpd 20 otb             TB multifitus press    Against wall 5''10             bridges  nv                                                                 Ther Ex             Rhomboid stretch  10''x10             T-S ext  5''x10             Piriformis stretch              Prayer strechg 10''x10             Quad alt arm              UBE  3/3                                      Ther Activity                                       Gait Training                                       Modalities

## 2021-06-17 ENCOUNTER — OFFICE VISIT (OUTPATIENT)
Dept: PHYSICAL THERAPY | Facility: OTHER | Age: 56
End: 2021-06-17
Payer: COMMERCIAL

## 2021-06-17 DIAGNOSIS — M54.6 THORACIC SPINE PAIN: Primary | ICD-10-CM

## 2021-06-17 PROCEDURE — 97112 NEUROMUSCULAR REEDUCATION: CPT | Performed by: PHYSICAL THERAPIST

## 2021-06-17 PROCEDURE — 97140 MANUAL THERAPY 1/> REGIONS: CPT | Performed by: PHYSICAL THERAPIST

## 2021-06-17 PROCEDURE — 97110 THERAPEUTIC EXERCISES: CPT | Performed by: PHYSICAL THERAPIST

## 2021-06-17 NOTE — PROGRESS NOTES
Daily Note     Today's date: 2021  Patient name: Ritika Springer  : 1965  MRN: [id number]  Referring provider: PAUL Calvert  Dx:   Encounter Diagnosis     ICD-10-CM    1  Thoracic spine pain  M54 6                   Subjective: T-S pain improivnmg  Some increased L-s pain today  Objective: See treatment diary below      Assessment: Tolerated treatment well  Patient demonstrated fatigue post treatment      Plan: Continue per plan of care  Precautions:       Manuals 6 14 21 6 17           grd 5 prone  Gr 4 MJ                          ISTM paraspinals  MJ  MJ                        Neuro Re-Ed             TB row/lpd 20 otb  gbtNV            TB multifitus press    Against wall 5''10  otb 20each           bridges  nv  20                                                               Ther Ex             Rhomboid stretch  10''x10  10''x1            T-S ext  5''x10  Foam roll seated 5''x10            Piriformis stretch              Prayer strechg 10''x10  pball 10''x10            Quad alt arm   10            UBE  3/3 3/3            Quad kick  10ea                         Ther Activity                                       Gait Training                                       Modalities

## 2021-06-21 ENCOUNTER — OFFICE VISIT (OUTPATIENT)
Dept: PHYSICAL THERAPY | Facility: OTHER | Age: 56
End: 2021-06-21
Payer: COMMERCIAL

## 2021-06-21 DIAGNOSIS — M54.6 THORACIC SPINE PAIN: Primary | ICD-10-CM

## 2021-06-21 PROCEDURE — 97112 NEUROMUSCULAR REEDUCATION: CPT | Performed by: PHYSICAL THERAPIST

## 2021-06-21 PROCEDURE — 97110 THERAPEUTIC EXERCISES: CPT | Performed by: PHYSICAL THERAPIST

## 2021-06-21 PROCEDURE — 97140 MANUAL THERAPY 1/> REGIONS: CPT | Performed by: PHYSICAL THERAPIST

## 2021-06-21 NOTE — PROGRESS NOTES
Daily Note     Today's date: 2021  Patient name: Abilio Narayanan  : 1965  MRN: [id number]  Referring provider: PAUL Law  Dx:   No diagnosis found  Subjective: T-S pain improivnmg  She brought in her aero  Able to show her and modify several exercises she could do at home  Using the device  Objective: See treatment diary below      Assessment: Tolerated treatment well  Patient demonstrated fatigue post treatment      Plan: Continue per plan of care  Precautions:       Manuals 6 14 21 6 17 /          grd 5 prone  Gr 4 MJ   grd                        ISTM paraspinals  MJ  MJ                        Neuro Re-Ed             TB row/lpd 20 otb  gbtNV  25 gtb           TB multifitus press    Against wall 5''10  otb 20each gtb 25           bridges  nv  20 25           Mini squats    nv                                                  Ther Ex             Rhomboid stretch  10''x10  10''x1  10''x109           T-S ext  5''x10  Foam roll seated 5''x10  5''x10           Piriformis stretch              Prayer strechg 10''x10  pball 10''x10  pball 10''x10           Quad alt arm   10  15          UBE  3/3 3/3  15          Quad kick  1eea  15           Std row    NV           Ther Activity                                       Gait Training                                       Modalities

## 2021-06-24 ENCOUNTER — OFFICE VISIT (OUTPATIENT)
Dept: URGENT CARE | Age: 56
End: 2021-06-24
Payer: COMMERCIAL

## 2021-06-24 ENCOUNTER — OFFICE VISIT (OUTPATIENT)
Dept: PHYSICAL THERAPY | Facility: OTHER | Age: 56
End: 2021-06-24
Payer: COMMERCIAL

## 2021-06-24 VITALS
TEMPERATURE: 97.6 F | HEART RATE: 87 BPM | SYSTOLIC BLOOD PRESSURE: 130 MMHG | OXYGEN SATURATION: 98 % | DIASTOLIC BLOOD PRESSURE: 83 MMHG | RESPIRATION RATE: 18 BRPM

## 2021-06-24 DIAGNOSIS — M54.50 ACUTE BILATERAL LOW BACK PAIN WITHOUT SCIATICA: Primary | ICD-10-CM

## 2021-06-24 DIAGNOSIS — M54.6 THORACIC SPINE PAIN: Primary | ICD-10-CM

## 2021-06-24 PROCEDURE — G0382 LEV 3 HOSP TYPE B ED VISIT: HCPCS | Performed by: PHYSICIAN ASSISTANT

## 2021-06-24 PROCEDURE — 97110 THERAPEUTIC EXERCISES: CPT | Performed by: PHYSICAL THERAPIST

## 2021-06-24 PROCEDURE — 97140 MANUAL THERAPY 1/> REGIONS: CPT | Performed by: PHYSICAL THERAPIST

## 2021-06-24 RX ORDER — MELOXICAM 7.5 MG/1
7.5 TABLET ORAL DAILY
Qty: 14 TABLET | Refills: 0 | Status: SHIPPED | OUTPATIENT
Start: 2021-06-24 | End: 2022-04-13

## 2021-06-24 NOTE — PATIENT INSTRUCTIONS
Began using Mobic prescribed  Continue with gentle range-of-motion stretching alternating heat and ice to the area, gentle massage   continue physical therapy   follow up with primary care physician to 5 days for continued symptoms  Follow up with pain management has discussed her continued symptoms  Proceed directly to the emergency room with any worsening of symptoms, loss of bowel or bladder function, fevers or chills not respond to over-the-counter medication    Back Pain   WHAT YOU NEED TO KNOW:   Back pain is common  It can be caused by many conditions, such as arthritis or the breakdown of spinal discs  Your risk for back pain is increased by injuries, lack of activity, or repeated bending and twisting  You may feel sore or stiff on one or both sides of your back  The pain may spread to your buttocks or thighs  DISCHARGE INSTRUCTIONS:   Return to the emergency department if:   · You have pain, numbness, or weakness in one or both legs  · Your pain becomes so severe that you cannot walk  · You cannot control your urine or bowel movements  · You have severe back pain with chest pain  · You have severe back pain, nausea, and vomiting  · You have severe back pain that spreads to your side or genital area  Contact your healthcare provider if:   · You have back pain that does not get better with rest and pain medicine  · You have a fever  · You have pain that worsens when you are on your back or when you rest     · You have pain that worsens when you cough or sneeze  · You lose weight without trying  · You have questions or concerns about your condition or care  Medicines:   · NSAIDs  help decrease swelling and pain  This medicine is available with or without a doctor's order  NSAIDs can cause stomach bleeding or kidney problems in certain people  If you take blood thinner medicine, always ask your healthcare provider if NSAIDs are safe for you   Always read the medicine label and follow directions  · Acetaminophen  decreases pain and fever  It is available without a doctor's order  Ask how much to take and how often to take it  Follow directions  Read the labels of all other medicines you are using to see if they also contain acetaminophen, or ask your doctor or pharmacist  Acetaminophen can cause liver damage if not taken correctly  Do not use more than 4 grams (4,000 milligrams) total of acetaminophen in one day  · Muscle relaxers  help decrease muscle spasms and back pain  · Prescription pain medicine  may be given  Ask your healthcare provider how to take this medicine safely  Some prescription pain medicines contain acetaminophen  Do not take other medicines that contain acetaminophen without talking to your healthcare provider  Too much acetaminophen may cause liver damage  Prescription pain medicine may cause constipation  Ask your healthcare provider how to prevent or treat constipation  · Take your medicine as directed  Contact your healthcare provider if you think your medicine is not helping or if you have side effects  Tell him or her if you are allergic to any medicine  Keep a list of the medicines, vitamins, and herbs you take  Include the amounts, and when and why you take them  Bring the list or the pill bottles to follow-up visits  Carry your medicine list with you in case of an emergency  How to manage your back pain:   · Apply ice  on your back for 15 to 20 minutes every hour or as directed  Use an ice pack, or put crushed ice in a plastic bag  Cover it with a towel before you apply it to your skin  Ice helps prevent tissue damage and decreases pain  · Apply heat  on your back for 20 to 30 minutes every 2 hours for as many days as directed  Heat helps decrease pain and muscle spasms  · Stay active  as much as you can without causing more pain  Bed rest could make your back pain worse  Avoid heavy lifting until your pain is gone      · Go to physical therapy as directed  A physical therapist can teach you exercises to help improve movement and strength, and to decrease pain  Follow up with your healthcare provider in 2 weeks, or as directed:  Write down your questions so you remember to ask them during your visits  © Copyright [address] Information is for End User's use only and may not be sold, redistributed or otherwise used for commercial purposes  All illustrations and images included in CareNotes® are the copyrighted property of [employer] [place]  or [facility] Nicki Delgado   The above information is an  only  It is not intended as medical advice for individual conditions or treatments  Talk to your doctor, nurse or pharmacist before following any medical regimen to see if it is safe and effective for you

## 2021-06-24 NOTE — PROGRESS NOTES
Daily Note     Today's date: 2021  Patient name: Janet Dave  : 1965  MRN: [id number]  Referring provider: PAUL Fitzpatrick  Dx:   Encounter Diagnosis     ICD-10-CM    1  Thoracic spine pain  M54 6                   Subjective: increased LBP yesterday am she went to MD given medication  Objective: See treatment diary below      Assessment: Tolerated treatment well  Patient demonstrated fatigue post treatment      Plan: Continue per plan of care  Precautions:       Manuals 6 14 21 6 17  24         grd 5 prone  Gr 4 MJ   grd                        ISTM paraspinals  MJ  MJ MJ  MJ                       Neuro Re-Ed             TB row/lpd 20 otb  gbtNV  25 gtb           TB multifitus press    Against wall 5''10  otb 20each gtb 25           bridges  nv  20 25           Mini squats    nv  20                                                 Ther Ex             Rhomboid stretch  10''x10  10''x1  10''x109  10''x10          T-S ext  5''x10  Foam roll seated 5''x10  5''x10  5'x10          Piriformis stretch     10''x10          Prayer strechg 10''x10  pball 10''x10  pball 10''x10  10''x10          Quad alt arm   10  15          UBE  3/3 3/3  15 3/3          Quad kick  1eea  15           Std row    NV           Ther Activity                                       Gait Training                                       Modalities

## 2021-06-24 NOTE — PROGRESS NOTES
[facility]        NAME: Sebastian Pal is a 60 y o  female  : 1965    MRN: [id number]  DATE: 2021  TIME: 12:41 PM    Assessment and Plan   Acute bilateral low back pain without sciatica [M54 5]  1  Acute bilateral low back pain without sciatica  meloxicam (MOBIC) 7 5 mg tablet    Ambulatory referral to Pain Management         Patient Instructions     Began using Mobic prescribed  - patient were normal side effects meloxicam   She is to stop using medication for any skin changes, jaundiced adverse side effects  She verbalizes understanding  Continue with gentle range-of-motion stretching alternating heat and ice to the area, gentle massage   continue physical therapy   follow up with primary care physician to 5 days for continued symptoms  Follow up with pain management has discussed her continued symptoms  Proceed directly to the emergency room with any worsening of symptoms, loss of bowel or bladder function, fevers or chills not respond to over-the-counter medication  Follow up with PCP in 3-5 days  Proceed to  ER if symptoms worsen  Chief Complaint     Chief Complaint   Patient presents with    Back Pain     being tx  with neuro, whom ordered PT, last appt  2021, used old rx  of mobic with relief, requesting mobic rx  History of Present Illness       60year old female with  Past medical history of degenerative disc disease, chronic pain syndrome presents the office for a chief complaint of lower back pain has been present for her for some time  Patient notes that she has waxing waning pain however the pain located in her lower back has caused difficulties with her doing physical therapy for pain in her thoracic back  She denies any new injury or trauma to the back  Patient did take Mobic for a few days in knows that this has been helping with her symptoms  She denies wanting any kind of muscle relaxer or opioids today    Patient was being seen by Neurology for thoracic back pain who referred her to physical therapy  She has been doing physical therapy for a few weeks 2 times a week  She notes that this has been helping  Patient denies any loss of bowel or bladder function, significant radiation into the legs, numbness or tingling into the legs  She has been sleeping with a heating pad to help with her symptoms  Back Pain  This is a new problem  The current episode started in the past 7 days  The problem occurs daily  The problem is unchanged  The pain is present in the lumbar spine and sacro-iliac  The pain is at a severity of 5/10  Pertinent negatives include no bladder incontinence, bowel incontinence, fever, headaches, numbness, paresthesias, perianal numbness or tingling  She has tried NSAIDs, heat and home exercises for the symptoms  The treatment provided mild relief  Review of Systems   Review of Systems   Constitutional: Negative for chills and fever  Respiratory: Negative  Cardiovascular: Negative  Gastrointestinal: Negative for bowel incontinence  Genitourinary: Negative  Negative for bladder incontinence  Denies incontinence   Musculoskeletal: Positive for back pain and myalgias  Skin: Negative for color change  Neurological: Negative for tingling, numbness, headaches and paresthesias           Current Medications       Current Outpatient Medications:     Calcium Carbonate-Vitamin D (CALCIUM 600+D HIGH POTENCY PO), Take by mouth daily, Disp: , Rfl:     Cholecalciferol (VITAMIN D3) 5000 units CAPS, Take by mouth daily, Disp: , Rfl:     famotidine (PEPCID) 40 MG tablet, Take 1 tablet by mouth daily as needed, Disp: , Rfl:     Flaxseed, Linseed, (FLAX SEED OIL) 1000 MG CAPS, Take 2 capsules by mouth daily , Disp: , Rfl:     fluticasone (FLONASE) 50 mcg/act nasal spray, 2 sprays into each nostril daily (Patient taking differently: 2 sprays into each nostril as needed ), Disp: 16 g, Rfl: 0    gabapentin (NEURONTIN) 300 mg capsule, Take 1 capsule (300 mg total) by mouth 3 (three) times a day, Disp: 90 capsule, Rfl: 3    ibuprofen (MOTRIN) 200 mg tablet, Take by mouth every 6 (six) hours as needed for mild pain, Disp: , Rfl:     losartan-hydrochlorothiazide (HYZAAR) 50-12 5 mg per tablet, Take 1 tablet by mouth daily, Disp: 90 tablet, Rfl: 0    meloxicam (MOBIC) 7 5 mg tablet, Take 1 tablet (7 5 mg total) by mouth daily, Disp: 14 tablet, Rfl: 0    naloxone (NARCAN) 4 mg/0 1 mL nasal spray, Administer 1 spray into a nostril  If breathing does not return to normal or if breathing difficulty resumes after 2-3 minutes, give another dose in the other nostril using a new spray   (Patient not taking: Reported on 5/17/2021), Disp: 1 each, Rfl: 1    Current Allergies     Allergies as of 06/24/2021 - Reviewed 06/24/2021   Allergen Reaction Noted    Banana - food allergy GI Intolerance 09/14/2020    Statins Arthralgia 03/31/2021            The following portions of the patient's history were reviewed and updated as appropriate: allergies, current medications, past family history, past medical history, past social history, past surgical history and problem list      Past Medical History:   Diagnosis Date    Abscess of back     Resolved 1/11/2018     Adverse reaction to statin medication     Resolved 1/11/2018     [facility]ism ([facility] [address] )     AGE 27    Anesthesia complication     extreme dizziness upon awakening    Anxiety     Chronic pain disorder      rt side pinched nerve    Depression     Edema     GERD (gastroesophageal reflux disease)     Hyperlipidemia     Hypertension     Irregular heart beat     palpitations    Irritable bowel syndrome     Liver disease     fatty    Neck pain     Pinched nerve     Last assessed 9/26/2017     PONV (postoperative nausea and vomiting)     Tuberculosis     1986       Past Surgical History:   Procedure Laterality Date    CARDIAC CATHETERIZATION      due to brothers advanced heart disease and abnormal stress test    COLONOSCOPY N/A 12/6/2017    Procedure: COLONOSCOPY;  Surgeon: Ceasar Hoang MD;  Location: [facility]; Service: Gastroenterology    COLONOSCOPY  12    ELBOW SURGERY Right     tendon repair, post op nausea and dizziness    ESOPHAGOGASTRODUODENOSCOPY N/A 12/6/2017    Procedure: ESOPHAGOGASTRODUODENOSCOPY (EGD); Surgeon: Ceasar Hoang MD;  Location[facility]; Service: Gastroenterology    ESOPHAGOGASTRODUODENOSCOPY  1986    Diagnostic     SKIN BIOPSY      Last assessed 9/28/2017     [de-identified] TOOTH EXTRACTION         Family History   Problem Relation Age of Onset    Alzheimer's disease Mother     Thyroid disease Mother     Edema Mother     Osteoarthritis Mother         Knee    Stroke Father     Hypertension Father     Hyperlipidemia Father     Arthritis Father     Coronary artery disease Father     Hypothyroidism Father     Other Father         status post coronary artery bypass graft     Stroke Sister     Heart failure Sister     Heart disease Sister     Drug abuse Brother     Completed Suicide  Brother     No Known Problems Maternal Grandmother     No Known Problems Maternal Grandfather     No Known Problems Paternal Grandmother     No Known Problems Paternal Grandfather     Breast cancer Paternal Aunt 48    Colon cancer Maternal Uncle 76    No Known Problems Maternal Aunt     No Known Problems Maternal Aunt          Medications have been verified  Objective   /83   Pulse 87   Temp 97 6 °F (36 4 °C)   Resp 18   SpO2 98%   No LMP recorded  Patient is postmenopausal        Physical Exam     Physical Exam  Vitals and nursing note reviewed  Constitutional:       General: She is not in acute distress  Appearance: She is well-developed  HENT:      Head: Normocephalic and atraumatic        Right Ear: Hearing and external ear normal       Left Ear: Hearing and external ear normal       Nose: Nose normal    Eyes: Conjunctiva/sclera: Conjunctivae normal       Pupils: Pupils are equal, round, and reactive to light  Cardiovascular:      Rate and Rhythm: Normal rate and regular rhythm  Pulses: Normal pulses  Heart sounds: Normal heart sounds  No murmur heard  No friction rub  No gallop  Pulmonary:      Effort: Pulmonary effort is normal  No respiratory distress  Breath sounds: Normal breath sounds  No wheezing or rales  Musculoskeletal:         General: No deformity  Cervical back: Normal range of motion  Lumbar back: Tenderness present  No signs of trauma, spasms or bony tenderness  Decreased range of motion  Back:       Comments:   Strength bilateral lower extremities 5/5  Sensation intact to crude touch in bilateral lower extremities  Gait mildly antalgic secondary to pain  Straight leg raise negative bilaterally   Skin:     General: Skin is warm and dry  Capillary Refill: Capillary refill takes less than 2 seconds  Findings: No ecchymosis, erythema or laceration  Neurological:      Mental Status: She is alert  Sensory: No sensory deficit  Motor: No abnormal muscle tone  Deep Tendon Reflexes: Reflexes normal    Psychiatric:         Behavior: Behavior is cooperative

## 2021-06-28 ENCOUNTER — OFFICE VISIT (OUTPATIENT)
Dept: PHYSICAL THERAPY | Facility: OTHER | Age: 56
End: 2021-06-28
Payer: COMMERCIAL

## 2021-06-28 DIAGNOSIS — M54.6 THORACIC SPINE PAIN: Primary | ICD-10-CM

## 2021-06-28 PROCEDURE — 97140 MANUAL THERAPY 1/> REGIONS: CPT | Performed by: PHYSICAL THERAPIST

## 2021-06-28 PROCEDURE — 97112 NEUROMUSCULAR REEDUCATION: CPT | Performed by: PHYSICAL THERAPIST

## 2021-06-28 PROCEDURE — 97110 THERAPEUTIC EXERCISES: CPT | Performed by: PHYSICAL THERAPIST

## 2021-06-28 NOTE — PROGRESS NOTES
Daily Note     Today's date: 2021  Patient name: Bhumi Reynolds  : 1965  MRN: [id number]  Referring provider: PAUL Lo  Dx:   No diagnosis found  Subjective:  L-S feeling much better minimal to no pain at th end of today session  Objective: See treatment diary below      Assessment: Tolerated treatment well  Patient demonstrated fatigue post treatment      Plan: Continue per plan of care  Precautions:       Manuals 6 14 21 6 17  24 6 28        grd 5 prone  Gr 4 MJ   grd  gr4  gr4 L-S tps                      ISTM paraspinals  MJ  MJ MJ  MJ  MJ                      Neuro Re-Ed             TB row/lpd 20 otb  gbtNV  25 gtb   gtb 25         TB multifitus press    Against wall 5''10  otb 20each gtb 25   gtb 25         bridges  nv  20 25   25        Mini squats    nv  20  25                                               Ther Ex             Rhomboid stretch  10''x10  10''x1  10''x109  10''x10  10''x10         T-S ext  5''x10  Foam roll seated 5''x10  5''x10  5'x10  5''x10         Piriformis stretch     10''x10          Prayer strechg 10''x10  pball 10''x10  pball 10''x10  10''x10  10''x10         Quad alt arm   10  15  15        UBE  3/3 3/3  15 3/3  3/3         Quad kick  1eea  15   15        Std row    NV           Ther Activity                                       Gait Training                                       Modalities

## 2021-07-01 ENCOUNTER — OFFICE VISIT (OUTPATIENT)
Dept: PHYSICAL THERAPY | Facility: OTHER | Age: 56
End: 2021-07-01
Payer: COMMERCIAL

## 2021-07-01 DIAGNOSIS — M54.6 THORACIC SPINE PAIN: Primary | ICD-10-CM

## 2021-07-01 PROCEDURE — 97140 MANUAL THERAPY 1/> REGIONS: CPT | Performed by: PHYSICAL THERAPIST

## 2021-07-01 PROCEDURE — 97110 THERAPEUTIC EXERCISES: CPT | Performed by: PHYSICAL THERAPIST

## 2021-07-01 NOTE — PROGRESS NOTES
Daily Note     Today's date: 2021  Patient name: Giovana Tobar  : 1965  MRN: [id number]  Referring provider: Lucianne Libman, CRNP  Dx:   Encounter Diagnosis     ICD-10-CM    1  Thoracic spine pain  M54 6                   Subjective:  Feeling muhc better today  We discussed that we would do 1-2 more weeks of PT  She is is pleased with progress          Objective: See treatment diary below      Assessment: Tolerated treatment well  Patient demonstrated fatigue post treatment      Plan: Continue per plan of care  Precautions:       Manuals 6 14 21 6 17  6 24 7 1        grd 5 prone  Gr 4 MJ   grd  gr4  gr4 L-S tps                      ISTM paraspinals  MJ  MJ MJ  MJ  MJ                      Neuro Re-Ed             TB row/lpd 20 otb  gbtNV  25 gtb   gtb 25         TB multifitus press    Against wall 5''10  otb 20each gtb 25   gtb 25         bridges  nv  20 25   25        Mini squats    nv  20  25                                               Ther Ex             Rhomboid stretch  10''x10  10''x1  10''x109  10''x10  10''x10         T-S ext  5''x10  Foam roll seated 5''x10  5''x10  5'x10  5''x10         Piriformis stretch     10''x10          Prayer strechg 10''x10  pball 10''x10  pball 10''x10  10''x10  10''x10         Quad alt arm   10  15  20        UBE  3/3 3/3  15 3/3  3/3         Quad kick  1eea  15   20        Std row    NV   NV         Ther Activity                                       Gait Training                                       Modalities

## 2021-07-08 ENCOUNTER — APPOINTMENT (OUTPATIENT)
Dept: PHYSICAL THERAPY | Facility: OTHER | Age: 56
End: 2021-07-08
Payer: COMMERCIAL

## 2021-07-15 ENCOUNTER — OFFICE VISIT (OUTPATIENT)
Dept: PHYSICAL THERAPY | Facility: OTHER | Age: 56
End: 2021-07-15
Payer: COMMERCIAL

## 2021-07-15 DIAGNOSIS — M54.6 THORACIC SPINE PAIN: Primary | ICD-10-CM

## 2021-07-15 PROCEDURE — 97110 THERAPEUTIC EXERCISES: CPT | Performed by: PHYSICAL THERAPIST

## 2021-07-15 PROCEDURE — 97140 MANUAL THERAPY 1/> REGIONS: CPT | Performed by: PHYSICAL THERAPIST

## 2021-07-15 NOTE — PROGRESS NOTES
Daily Note     Today's date: 7/15/2021  Patient name: Benitez Watters  : 1965  MRN: [id number]  Referring provider: PAUL Gandhi  Dx:   Encounter Diagnosis     ICD-10-CM    1  Thoracic spine pain  M54 6                   Subjective: patient has met the majority of the goals set for PT  She reported feeling about 75% better  We will D/C from PT at this time  ROM is now pain free  We discussed the most important exercises to continue with  minimal change in FOTO  Objective: See treatment diary below      Assessment: Tolerated treatment well  Patient demonstrated fatigue post treatment      Plan: Continue per plan of care  Precautions:       Manuals 6 14 21 6 17  24 7 15        grd 5 prone  Gr 4 MJ   grd  gr4  gr4 L-S tps                      ISTM paraspinals  MJ  MJ MJ  MJ  MJ                      Neuro Re-Ed             TB row/lpd 20 otb  gbtNV  25 gtb   gtb 25         TB multifitus press    Against wall 5''10  otb 20each gtb 25   gtb 25         bridges  nv  20 25   25        Mini squats    nv  20  25                                               Ther Ex             Rhomboid stretch  10''x10  10''x1  10''x109  10''x10  10''x10         T-S ext  5''x10  Foam roll seated 5''x10  5''x10  5'x10  5''x10         Piriformis stretch     10''x10          Prayer strechg 10''x10  pball 10''x10  pball 10''x10  10''x10  10''x10         Quad alt arm   10  15  20        UBE  3/3 3/3  15 3/3  3/3         Quad kick  1eea  15   20        Std row    NV           Ther Activity                                       Gait Training                                       Modalities

## 2021-08-11 ENCOUNTER — OFFICE VISIT (OUTPATIENT)
Dept: URGENT CARE | Age: 56
End: 2021-08-11
Payer: COMMERCIAL

## 2021-08-11 ENCOUNTER — TELEPHONE (OUTPATIENT)
Dept: URGENT CARE | Age: 56
End: 2021-08-11

## 2021-08-11 VITALS
OXYGEN SATURATION: 97 % | RESPIRATION RATE: 14 BRPM | TEMPERATURE: 97.5 F | DIASTOLIC BLOOD PRESSURE: 71 MMHG | HEART RATE: 80 BPM | SYSTOLIC BLOOD PRESSURE: 138 MMHG

## 2021-08-11 DIAGNOSIS — L02.91 ABSCESS: Primary | ICD-10-CM

## 2021-08-11 PROCEDURE — G0382 LEV 3 HOSP TYPE B ED VISIT: HCPCS | Performed by: PHYSICIAN ASSISTANT

## 2021-08-11 RX ORDER — CEPHALEXIN 500 MG/1
500 CAPSULE ORAL EVERY 6 HOURS SCHEDULED
Qty: 20 CAPSULE | Refills: 0 | Status: SHIPPED | OUTPATIENT
Start: 2021-08-11 | End: 2021-08-16

## 2021-08-11 NOTE — PATIENT INSTRUCTIONS
Antibiotics as directed   Warm compresses  Tylenol and ibuprofen  Follow-up with PCP   ER symptoms worsen

## 2021-08-11 NOTE — TELEPHONE ENCOUNTER
The patient called the office stating that her prescription was not at the pharmacy  I did call the [facility] and confirmed that they did electronically receive the prescription  There fillling it now  I called the patient left a message with these information

## 2021-08-11 NOTE — PROGRESS NOTES
[facility]        NAME: Melissa Pearson is a 60 y o  female  : 1965    MRN: [id number]  DATE: 2021  TIME: 8:51 AM    Assessment and Plan   Abscess [L02 91]  1  Abscess  cephalexin (KEFLEX) 500 mg capsule         Patient Instructions       Follow up with PCP in 3-5 days  Proceed to  ER if symptoms worsen  Chief Complaint     Chief Complaint   Patient presents with    Skin Problem     left jaw-line since Monday         History of Present Illness        Patient started with a pimple on her left jaw the other day  She states she has been doing warm compresses but this morning when she woke up it is bigger  She also states she just does not feel well  She denies any drainage  She denies any fevers  Review of Systems   Review of Systems   Constitutional: Negative  HENT: Negative  Respiratory: Negative  Cardiovascular: Negative  Gastrointestinal: Negative  Musculoskeletal: Negative  Skin: Positive for wound  Neurological: Negative  Psychiatric/Behavioral: Negative            Current Medications       Current Outpatient Medications:     Calcium Carbonate-Vitamin D (CALCIUM 600+D HIGH POTENCY PO), Take by mouth daily, Disp: , Rfl:     cephalexin (KEFLEX) 500 mg capsule, Take 1 capsule (500 mg total) by mouth every 6 (six) hours for 5 days, Disp: 20 capsule, Rfl: 0    Cholecalciferol (VITAMIN D3) 5000 units CAPS, Take by mouth daily, Disp: , Rfl:     famotidine (PEPCID) 40 MG tablet, Take 1 tablet by mouth daily as needed, Disp: , Rfl:     Flaxseed, Linseed, (FLAX SEED OIL) 1000 MG CAPS, Take 2 capsules by mouth daily , Disp: , Rfl:     fluticasone (FLONASE) 50 mcg/act nasal spray, 2 sprays into each nostril daily (Patient taking differently: 2 sprays into each nostril as needed ), Disp: 16 g, Rfl: 0    gabapentin (NEURONTIN) 300 mg capsule, Take 1 capsule (300 mg total) by mouth 3 (three) times a day, Disp: 90 capsule, Rfl: 3    ibuprofen (MOTRIN) 200 mg tablet, Take by mouth every 6 (six) hours as needed for mild pain, Disp: , Rfl:     losartan-hydrochlorothiazide (HYZAAR) 50-12 5 mg per tablet, Take 1 tablet by mouth daily, Disp: 90 tablet, Rfl: 0    meloxicam (MOBIC) 7 5 mg tablet, Take 1 tablet (7 5 mg total) by mouth daily, Disp: 14 tablet, Rfl: 0    naloxone (NARCAN) 4 mg/0 1 mL nasal spray, Administer 1 spray into a nostril  If breathing does not return to normal or if breathing difficulty resumes after 2-3 minutes, give another dose in the other nostril using a new spray   (Patient not taking: Reported on 5/17/2021), Disp: 1 each, Rfl: 1    Current Allergies     Allergies as of 08/11/2021 - Reviewed 08/11/2021   Allergen Reaction Noted    Banana - food allergy GI Intolerance 09/14/2020    Statins Arthralgia 03/31/2021            The following portions of the patient's history were reviewed and updated as appropriate: allergies, current medications, past family history, past medical history, past social history, past surgical history and problem list      Past Medical History:   Diagnosis Date    Abscess of back     Resolved 1/11/2018     Adverse reaction to statin medication     Resolved 1/11/2018     Alcoholism ([address] )     AGE 27    Anesthesia complication     extreme dizziness upon awakening    Anxiety     Chronic pain disorder      rt side pinched nerve    Depression     Edema     GERD (gastroesophageal reflux disease)     Hyperlipidemia     Hypertension     Irregular heart beat     palpitations    Irritable bowel syndrome     Liver disease     fatty    Neck pain     Pinched nerve     Last assessed 9/26/2017     PONV (postoperative nausea and vomiting)     Tuberculosis     1986       Past Surgical History:   Procedure Laterality Date    CARDIAC CATHETERIZATION      due to brothers advanced heart disease and abnormal stress test    COLONOSCOPY N/A 12/6/2017    Procedure: COLONOSCOPY;  Surgeon: Nayeli Bhakta MD;  Location: [facility]; Service: Gastroenterology    COLONOSCOPY  12    ELBOW SURGERY Right     tendon repair, post op nausea and dizziness    ESOPHAGOGASTRODUODENOSCOPY N/A 12/6/2017    Procedure: ESOPHAGOGASTRODUODENOSCOPY (EGD); Surgeon: Enrique Avila MD;  Location: [facility]; Service: Gastroenterology    ESOPHAGOGASTRODUODENOSCOPY  1986    Diagnostic     SKIN BIOPSY      Last assessed 9/28/2017     [de-identified] TOOTH EXTRACTION         Family History   Problem Relation Age of Onset    Alzheimer's disease Mother     Thyroid disease Mother     Edema Mother     Osteoarthritis Mother         Knee    Stroke Father     Hypertension Father     Hyperlipidemia Father     Arthritis Father     Coronary artery disease Father     Hypothyroidism Father     Other Father         status post coronary artery bypass graft     Stroke Sister     Heart failure Sister     Heart disease Sister     Drug abuse Brother     Completed Suicide  Brother     No Known Problems Maternal Grandmother     No Known Problems Maternal Grandfather     No Known Problems Paternal Grandmother     No Known Problems Paternal Grandfather     Breast cancer Paternal Aunt 48    Colon cancer Maternal Uncle 76    No Known Problems Maternal Aunt     No Known Problems Maternal Aunt          Medications have been verified  Objective   /71   Pulse 80   Temp 97 5 °F (36 4 °C)   Resp 14   SpO2 97%        Physical Exam     Physical Exam  Vitals and nursing note reviewed  Constitutional:       General: She is not in acute distress  Appearance: Normal appearance  She is not ill-appearing or toxic-appearing  HENT:      Head: Normocephalic and atraumatic  Mouth/Throat:      Mouth: Mucous membranes are moist       Pharynx: No posterior oropharyngeal erythema  Cardiovascular:      Rate and Rhythm: Normal rate and regular rhythm  Pulses: Normal pulses  Heart sounds: Normal heart sounds     Pulmonary:      Effort: Pulmonary effort is normal       Breath sounds: Normal breath sounds  No wheezing  Skin:     General: Skin is warm and dry  Comments:   Small pustule with mild surrounding erythema  No active drainage   Neurological:      General: No focal deficit present  Mental Status: She is alert and oriented to person, place, and time     Psychiatric:         Mood and Affect: Mood normal          Behavior: Behavior normal

## 2021-08-18 ENCOUNTER — OFFICE VISIT (OUTPATIENT)
Dept: GASTROENTEROLOGY | Facility: AMBULARY SURGERY CENTER | Age: 56
End: 2021-08-18
Payer: COMMERCIAL

## 2021-08-18 VITALS
DIASTOLIC BLOOD PRESSURE: 80 MMHG | HEIGHT: 61 IN | WEIGHT: 179 LBS | SYSTOLIC BLOOD PRESSURE: 142 MMHG | BODY MASS INDEX: 33.79 KG/M2

## 2021-08-18 DIAGNOSIS — Z86.010 HISTORY OF COLON POLYPS: Primary | ICD-10-CM

## 2021-08-18 PROBLEM — Z86.0100 HISTORY OF COLON POLYPS: Status: ACTIVE | Noted: 2021-08-18

## 2021-08-18 PROCEDURE — 99243 OFF/OP CNSLTJ NEW/EST LOW 30: CPT | Performed by: INTERNAL MEDICINE

## 2021-08-18 RX ORDER — SODIUM, POTASSIUM,MAG SULFATES 17.5-3.13G
2 SOLUTION, RECONSTITUTED, ORAL ORAL SEE ADMIN INSTRUCTIONS
Qty: 177 ML | Refills: 0 | Status: SHIPPED | OUTPATIENT
Start: 2021-08-18 | End: 2022-04-13

## 2021-08-18 RX ORDER — SENNOSIDES 8.6 MG
650 CAPSULE ORAL EVERY 8 HOURS PRN
COMMUNITY

## 2021-08-18 NOTE — PROGRESS NOTES
Consultation - [facility]  Giovana Bachelor 1965 female         Chief Complaint:    For colonoscopy    HPI:   28-year-old female with history of hypertension was referred for colonoscopy patient has history of colon polyps on the last colonoscopy was about 4 years ago  Patient has regular bowel movements usually once or twice soft bowel movements and denies any blood or mucus in the stool  Appetite is good and denies any recent weight loss  Denies any abdominal pain, nausea, or vomiting  Has no heartburn or acid reflux  Denies any difficulty swallowing  REVIEW OF SYSTEMS: Review of Systems   Constitutional: Negative for activity change, appetite change, chills, diaphoresis, fatigue, fever and unexpected weight change  HENT: Negative for ear discharge, ear pain, facial swelling, hearing loss, nosebleeds, sore throat, tinnitus and voice change  Eyes: Negative for pain, discharge, redness, itching and visual disturbance  Respiratory: Negative for apnea, cough, chest tightness, shortness of breath and wheezing  Cardiovascular: Negative for chest pain and palpitations  Gastrointestinal:        As noted in HPI   Endocrine: Negative for cold intolerance, heat intolerance and polyuria  Genitourinary: Negative for difficulty urinating, dysuria, flank pain, hematuria and urgency  Musculoskeletal: Negative for arthralgias, back pain, gait problem, joint swelling and myalgias  Skin: Negative for rash and wound  Neurological: Negative for dizziness, tremors, seizures, speech difficulty, light-headedness, numbness and headaches  Hematological: Negative for adenopathy  Does not bruise/bleed easily  Psychiatric/Behavioral: Negative for agitation, behavioral problems and confusion  The patient is not nervous/anxious           Past Medical History:   Diagnosis Date    Abscess of back     Resolved 1/11/2018     Adverse reaction to statin medication     Resolved 1/11/2018     [address] [address]     AGE 27    Anesthesia complication     extreme dizziness upon awakening    Anxiety     Chronic pain disorder      rt side pinched nerve    Colon polyp     Depression     Edema     GERD (gastroesophageal reflux disease)     Hyperlipidemia     Hypertension     Irregular heart beat     palpitations    Irritable bowel syndrome     Liver disease     fatty    Neck pain     Pinched nerve     Last assessed 2017     PONV (postoperative nausea and vomiting)     Tuberculosis           Past Surgical History:   Procedure Laterality Date    CARDIAC CATHETERIZATION      due to brothers advanced heart disease and abnormal stress test    COLONOSCOPY N/A 2017    Procedure: COLONOSCOPY;  Surgeon: Nayeli Bhakta MD;  Location: [facility]; Service: Gastroenterology    COLONOSCOPY  12    ELBOW SURGERY Right     tendon repair, post op nausea and dizziness    ESOPHAGOGASTRODUODENOSCOPY N/A 2017    Procedure: ESOPHAGOGASTRODUODENOSCOPY (EGD); Surgeon: Nayeli Bhakta MD;  Location: [facility];   Service: Gastroenterology    ESOPHAGOGASTRODUODENOSCOPY      Diagnostic     SKIN BIOPSY      Last assessed 2017     UPPER GASTROINTESTINAL ENDOSCOPY      WISDOM TOOTH EXTRACTION       Social History     Socioeconomic History    Marital status: Single     Spouse name: Not on file    Number of children: Not on file    Years of education: Not on file    Highest education level: Not on file   Occupational History    Not on file   Tobacco Use    Smoking status: Former Smoker     Packs/day: 1 00     Years: 9 00     Pack years: 9 00     Quit date:      Years since quittin 6    Smokeless tobacco: Never Used   Vaping Use    Vaping Use: Never used   Substance and Sexual Activity    Alcohol use: No     Comment: 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    Drug use: No    Sexual activity: Not Currently   Other Topics Concern    Not on file   Social History Narrative    Not on file     Social Determinants of Health     Financial Resource Strain:     Difficulty of Paying Living Expenses:    Food Insecurity:     Worried About Running Out of Food in the Last Year:     [address] in the Last Year:    Transportation Needs:     Lack of Transportation (Medical):      Lack of Transportation (Non-Medical):    Physical Activity:     Days of Exercise per Week:     Minutes of Exercise per Session:    Stress:     Feeling of Stress :    Social Connections:     Frequency of Communication with Friends and Family:     Frequency of Social Gatherings with Friends and Family:     Attends Taoism Services:     Active Member of Clubs or Organizations:     Attends Club or Organization Meetings:     Marital Status:    Intimate Partner Violence:     Fear of Current or Ex-Partner:     Emotionally Abused:     Physically Abused:     Sexually Abused:      Family History   Problem Relation Age of Onset    Alzheimer's disease Mother     Thyroid disease Mother     Edema Mother     Osteoarthritis Mother         Knee    Stroke Father     Hypertension Father     Hyperlipidemia Father     Arthritis Father     Coronary artery disease Father     Hypothyroidism Father     Other Father         status post coronary artery bypass graft     Stroke Sister     Heart failure Sister     Heart disease Sister     Drug abuse Brother     Completed Suicide  Brother     No Known Problems Maternal Grandmother     No Known Problems Maternal Grandfather     No Known Problems Paternal Grandmother     No Known Problems Paternal Grandfather     Breast cancer Paternal Aunt 48    Colon cancer Maternal Uncle 76    No Known Problems Maternal Aunt     No Known Problems Maternal Aunt      Banana - food allergy and Statins  Current Outpatient Medications   Medication Sig Dispense Refill    acetaminophen (TYLENOL) 650 mg CR tablet Take 650 mg by mouth every 8 (eight) hours as needed for mild pain      Calcium Carbonate-Vitamin D (CALCIUM 600+D HIGH POTENCY PO) Take by mouth daily      Cholecalciferol (VITAMIN D3) 5000 units CAPS Take by mouth daily      famotidine (PEPCID) 40 MG tablet Take 1 tablet by mouth daily as needed      Flaxseed, Linseed, (FLAX SEED OIL) 1000 MG CAPS Take 2 capsules by mouth daily       gabapentin (NEURONTIN) 300 mg capsule Take 1 capsule (300 mg total) by mouth 3 (three) times a day 90 capsule 3    losartan-hydrochlorothiazide (HYZAAR) 50-12 5 mg per tablet Take 1 tablet by mouth daily 90 tablet 0    meloxicam (MOBIC) 7 5 mg tablet Take 1 tablet (7 5 mg total) by mouth daily 14 tablet 0    fluticasone (FLONASE) 50 mcg/act nasal spray 2 sprays into each nostril daily (Patient not taking: Reported on 8/18/2021) 16 g 0    ibuprofen (MOTRIN) 200 mg tablet Take by mouth every 6 (six) hours as needed for mild pain (Patient not taking: Reported on 8/18/2021)      Na Sulfate-K Sulfate-Mg Sulf (Suprep Bowel Prep Kit) 17 5-3 13-1 6 GM/177ML SOLN Take 2 Bottles by mouth see administration instructions Please follow the instructions from the office 177 mL 0    naloxone (NARCAN) 4 mg/0 1 mL nasal spray Administer 1 spray into a nostril  If breathing does not return to normal or if breathing difficulty resumes after 2-3 minutes, give another dose in the other nostril using a new spray  (Patient not taking: Reported on 5/17/2021) 1 each 1     No current facility-administered medications for this visit  Blood pressure 142/80, height 5' 1" (1 549 m), weight 81 2 kg (179 lb), not currently breastfeeding  PHYSICAL EXAM: Physical Exam  Constitutional:       Appearance: She is well-developed  HENT:      Head: Normocephalic and atraumatic  Nose: Nose normal    Eyes:      General: No scleral icterus  Right eye: No discharge  Left eye: No discharge  Conjunctiva/sclera: Conjunctivae normal    Neck:      Thyroid: No thyromegaly  Vascular: No JVD        Trachea: No tracheal deviation  Cardiovascular:      Rate and Rhythm: Normal rate and regular rhythm  Heart sounds: Normal heart sounds  No murmur heard  No friction rub  No gallop  Pulmonary:      Effort: Pulmonary effort is normal  No respiratory distress  Breath sounds: Normal breath sounds  No wheezing or rales  Chest:      Chest wall: No tenderness  Abdominal:      General: Bowel sounds are normal  There is no distension  Palpations: Abdomen is soft  There is no mass  Tenderness: There is no abdominal tenderness  There is no guarding or rebound  Hernia: No hernia is present  Musculoskeletal:         General: No tenderness or deformity  Cervical back: Neck supple  Lymphadenopathy:      Cervical: No cervical adenopathy  Skin:     General: Skin is warm and dry  Findings: No erythema or rash  Neurological:      Mental Status: She is alert and oriented to person, place, and time  Psychiatric:         Behavior: Behavior normal          Thought Content: Thought content normal           Lab Results   Component Value Date    WBC 6 88 12/25/2019    HGB 14 3 12/25/2019    HCT 42 8 12/25/2019    MCV 88 12/25/2019     12/25/2019     Lab Results   Component Value Date    CALCIUM 9 0 03/29/2021    K 3 7 03/29/2021    CO2 26 03/29/2021     (H) 03/29/2021    BUN 10 03/29/2021    CREATININE 0 80 03/29/2021     Lab Results   Component Value Date    INR 0 94 12/25/2019    PROTIME 10 1 12/25/2019       XR spine thoracic 3 vw    Result Date: 5/31/2021  Impression: No acute osseous abnormality  Workstation performed: [id number]     US liver    Result Date: 6/5/2021  Impression: Significant increased parenchymal echogenicity and beam attenuation  While this may represent hepatic steatosis, there is a minor component of lobulation to the parenchymal contour which suggests possible underlying cirrhotic changes   Workstation performed: [id number]       ASSESSMENT & PLAN:    History of colon polyps   Personal history of colon polyps- patient is at increased risk for colon cancer screening  Rule out colorectal lesions including polyps or malignancy       -Schedule for colonoscopy  -High-fiber diet     -Patient was given instructions about the colonoscopy prep     -Patient was explained about  the risks and benefits of the procedure  Risks including but not limited to bleeding, infection, perforation were explained in detail  Also explained about less than 100% sensitivity with the exam and other alternatives

## 2021-08-30 DIAGNOSIS — I10 ESSENTIAL HYPERTENSION: ICD-10-CM

## 2021-08-30 RX ORDER — LOSARTAN POTASSIUM AND HYDROCHLOROTHIAZIDE 12.5; 5 MG/1; MG/1
1 TABLET ORAL DAILY
Qty: 90 TABLET | Refills: 1 | Status: SHIPPED | OUTPATIENT
Start: 2021-08-30 | End: 2021-12-16 | Stop reason: SDUPTHER

## 2021-10-15 ENCOUNTER — OFFICE VISIT (OUTPATIENT)
Dept: FAMILY MEDICINE CLINIC | Facility: CLINIC | Age: 56
End: 2021-10-15
Payer: COMMERCIAL

## 2021-10-15 VITALS
TEMPERATURE: 96.8 F | SYSTOLIC BLOOD PRESSURE: 130 MMHG | HEART RATE: 66 BPM | RESPIRATION RATE: 16 BRPM | BODY MASS INDEX: 33.79 KG/M2 | HEIGHT: 61 IN | DIASTOLIC BLOOD PRESSURE: 80 MMHG | OXYGEN SATURATION: 98 % | WEIGHT: 179 LBS

## 2021-10-15 DIAGNOSIS — R22.0 RIGHT FACIAL SWELLING: ICD-10-CM

## 2021-10-15 DIAGNOSIS — R53.83 FATIGUE, UNSPECIFIED TYPE: ICD-10-CM

## 2021-10-15 DIAGNOSIS — Z23 NEED FOR VACCINATION: ICD-10-CM

## 2021-10-15 DIAGNOSIS — F32.1 MODERATE MAJOR DEPRESSION (HCC): Primary | ICD-10-CM

## 2021-10-15 PROCEDURE — 90682 RIV4 VACC RECOMBINANT DNA IM: CPT

## 2021-10-15 PROCEDURE — 99214 OFFICE O/P EST MOD 30 MIN: CPT | Performed by: FAMILY MEDICINE

## 2021-10-15 PROCEDURE — 90471 IMMUNIZATION ADMIN: CPT

## 2021-11-03 ENCOUNTER — IMMUNIZATIONS (OUTPATIENT)
Dept: FAMILY MEDICINE CLINIC | Facility: HOSPITAL | Age: 56
End: 2021-11-03

## 2021-11-03 DIAGNOSIS — Z23 ENCOUNTER FOR IMMUNIZATION: Primary | ICD-10-CM

## 2021-11-03 PROCEDURE — 0011A COVID-19 MODERNA VACC 0.5 ML: CPT

## 2021-11-03 PROCEDURE — 91301 COVID-19 MODERNA VACC 0.5 ML: CPT

## 2021-11-10 ENCOUNTER — TELEPHONE (OUTPATIENT)
Dept: PREADMISSION TESTING | Facility: HOSPITAL | Age: 56
End: 2021-11-10

## 2021-11-11 ENCOUNTER — OFFICE VISIT (OUTPATIENT)
Dept: FAMILY MEDICINE CLINIC | Facility: CLINIC | Age: 56
End: 2021-11-11
Payer: COMMERCIAL

## 2021-11-11 VITALS
HEIGHT: 61 IN | SYSTOLIC BLOOD PRESSURE: 112 MMHG | HEART RATE: 80 BPM | WEIGHT: 176 LBS | OXYGEN SATURATION: 97 % | TEMPERATURE: 97.7 F | BODY MASS INDEX: 33.23 KG/M2 | RESPIRATION RATE: 18 BRPM | DIASTOLIC BLOOD PRESSURE: 76 MMHG

## 2021-11-11 DIAGNOSIS — L03.213 PERIORBITAL CELLULITIS OF LEFT EYE: Primary | ICD-10-CM

## 2021-11-11 PROCEDURE — 99213 OFFICE O/P EST LOW 20 MIN: CPT | Performed by: NURSE PRACTITIONER

## 2021-11-11 RX ORDER — CEPHALEXIN 500 MG/1
500 CAPSULE ORAL EVERY 12 HOURS SCHEDULED
Qty: 14 CAPSULE | Refills: 0 | Status: SHIPPED | OUTPATIENT
Start: 2021-11-11 | End: 2021-11-18

## 2021-11-17 ENCOUNTER — ANESTHESIA (OUTPATIENT)
Dept: GASTROENTEROLOGY | Facility: AMBULARY SURGERY CENTER | Age: 56
End: 2021-11-17

## 2021-11-17 ENCOUNTER — HOSPITAL ENCOUNTER (OUTPATIENT)
Dept: GASTROENTEROLOGY | Facility: AMBULARY SURGERY CENTER | Age: 56
Setting detail: OUTPATIENT SURGERY
Discharge: HOME | End: 2021-11-17
Attending: INTERNAL MEDICINE | Admitting: INTERNAL MEDICINE
Payer: COMMERCIAL

## 2021-11-17 ENCOUNTER — ANESTHESIA EVENT (OUTPATIENT)
Dept: GASTROENTEROLOGY | Facility: AMBULARY SURGERY CENTER | Age: 56
End: 2021-11-17

## 2021-11-17 VITALS
BODY MASS INDEX: 33.22 KG/M2 | HEIGHT: 61 IN | RESPIRATION RATE: 18 BRPM | TEMPERATURE: 97.3 F | WEIGHT: 175.93 LBS | DIASTOLIC BLOOD PRESSURE: 63 MMHG | OXYGEN SATURATION: 96 % | SYSTOLIC BLOOD PRESSURE: 124 MMHG | HEART RATE: 76 BPM

## 2021-11-17 DIAGNOSIS — Z86.010 HISTORY OF COLON POLYPS: ICD-10-CM

## 2021-11-17 PROCEDURE — G0121 COLON CA SCRN NOT HI RSK IND: HCPCS | Performed by: INTERNAL MEDICINE

## 2021-11-17 RX ORDER — SODIUM CHLORIDE, SODIUM LACTATE, POTASSIUM CHLORIDE, CALCIUM CHLORIDE 600; 310; 30; 20 MG/100ML; MG/100ML; MG/100ML; MG/100ML
INJECTION, SOLUTION INTRAVENOUS CONTINUOUS PRN
Status: DISCONTINUED | OUTPATIENT
Start: 2021-11-17 | End: 2021-11-17

## 2021-11-17 RX ORDER — LIDOCAINE HYDROCHLORIDE 10 MG/ML
INJECTION, SOLUTION EPIDURAL; INFILTRATION; INTRACAUDAL; PERINEURAL AS NEEDED
Status: DISCONTINUED | OUTPATIENT
Start: 2021-11-17 | End: 2021-11-17

## 2021-11-17 RX ORDER — PROPOFOL 10 MG/ML
INJECTION, EMULSION INTRAVENOUS AS NEEDED
Status: DISCONTINUED | OUTPATIENT
Start: 2021-11-17 | End: 2021-11-17

## 2021-11-17 RX ORDER — SODIUM CHLORIDE, SODIUM LACTATE, POTASSIUM CHLORIDE, CALCIUM CHLORIDE 600; 310; 30; 20 MG/100ML; MG/100ML; MG/100ML; MG/100ML
125 INJECTION, SOLUTION INTRAVENOUS CONTINUOUS
Status: DISCONTINUED | OUTPATIENT
Start: 2021-11-17 | End: 2021-11-21 | Stop reason: HOSPADM

## 2021-11-17 RX ORDER — PROPOFOL 10 MG/ML
INJECTION, EMULSION INTRAVENOUS CONTINUOUS PRN
Status: DISCONTINUED | OUTPATIENT
Start: 2021-11-17 | End: 2021-11-17

## 2021-11-17 RX ORDER — SODIUM CHLORIDE, SODIUM LACTATE, POTASSIUM CHLORIDE, CALCIUM CHLORIDE 600; 310; 30; 20 MG/100ML; MG/100ML; MG/100ML; MG/100ML
50 INJECTION, SOLUTION INTRAVENOUS CONTINUOUS
Status: CANCELLED | OUTPATIENT
Start: 2021-11-17

## 2021-11-17 RX ADMIN — PROPOFOL 100 MG: 10 INJECTION, EMULSION INTRAVENOUS at 08:09

## 2021-11-17 RX ADMIN — SODIUM CHLORIDE, SODIUM LACTATE, POTASSIUM CHLORIDE, AND CALCIUM CHLORIDE: .6; .31; .03; .02 INJECTION, SOLUTION INTRAVENOUS at 07:48

## 2021-11-17 RX ADMIN — LIDOCAINE HYDROCHLORIDE 50 MG: 10 INJECTION, SOLUTION EPIDURAL; INFILTRATION; INTRACAUDAL; PERINEURAL at 08:09

## 2021-11-17 RX ADMIN — SODIUM CHLORIDE, SODIUM LACTATE, POTASSIUM CHLORIDE, AND CALCIUM CHLORIDE 125 ML/HR: .6; .31; .03; .02 INJECTION, SOLUTION INTRAVENOUS at 07:37

## 2021-11-17 RX ADMIN — PROPOFOL 120 MCG/KG/MIN: 10 INJECTION, EMULSION INTRAVENOUS at 08:09

## 2021-11-29 ENCOUNTER — OFFICE VISIT (OUTPATIENT)
Dept: DERMATOLOGY | Facility: CLINIC | Age: 56
End: 2021-11-29
Payer: COMMERCIAL

## 2021-11-29 VITALS — TEMPERATURE: 97.6 F | HEIGHT: 61 IN | WEIGHT: 181 LBS | BODY MASS INDEX: 34.17 KG/M2

## 2021-11-29 DIAGNOSIS — L82.1 SEBORRHEIC KERATOSIS: ICD-10-CM

## 2021-11-29 DIAGNOSIS — L81.4 LENTIGO: ICD-10-CM

## 2021-11-29 DIAGNOSIS — L85.3 XEROSIS OF SKIN: ICD-10-CM

## 2021-11-29 DIAGNOSIS — D22.9 MULTIPLE MELANOCYTIC NEVI: Primary | ICD-10-CM

## 2021-11-29 DIAGNOSIS — L91.8 ACHROCHORDON: ICD-10-CM

## 2021-11-29 DIAGNOSIS — D18.01 CHERRY ANGIOMA: ICD-10-CM

## 2021-11-29 DIAGNOSIS — L57.0 KERATOSIS, ACTINIC: ICD-10-CM

## 2021-11-29 PROCEDURE — 99203 OFFICE O/P NEW LOW 30 MIN: CPT | Performed by: DERMATOLOGY

## 2021-11-29 PROCEDURE — 17000 DESTRUCT PREMALG LESION: CPT | Performed by: DERMATOLOGY

## 2021-12-16 DIAGNOSIS — M50.122 CERVICAL DISC DISORDER AT C5-C6 LEVEL WITH RADICULOPATHY: ICD-10-CM

## 2021-12-16 DIAGNOSIS — I10 ESSENTIAL HYPERTENSION: ICD-10-CM

## 2021-12-16 DIAGNOSIS — G89.4 CHRONIC PAIN SYNDROME: ICD-10-CM

## 2021-12-16 RX ORDER — LOSARTAN POTASSIUM AND HYDROCHLOROTHIAZIDE 12.5; 5 MG/1; MG/1
1 TABLET ORAL DAILY
Qty: 90 TABLET | Refills: 0 | Status: SHIPPED | OUTPATIENT
Start: 2021-12-16 | End: 2022-03-24 | Stop reason: SDUPTHER

## 2021-12-16 RX ORDER — GABAPENTIN 300 MG/1
300 CAPSULE ORAL 3 TIMES DAILY
Qty: 90 CAPSULE | Refills: 0 | Status: SHIPPED | OUTPATIENT
Start: 2021-12-16 | End: 2022-02-18 | Stop reason: SDUPTHER

## 2021-12-16 RX ORDER — LOSARTAN POTASSIUM AND HYDROCHLOROTHIAZIDE 12.5; 5 MG/1; MG/1
1 TABLET ORAL DAILY
Qty: 90 TABLET | Refills: 0 | Status: SHIPPED | OUTPATIENT
Start: 2021-12-16 | End: 2022-07-12 | Stop reason: SDUPTHER

## 2021-12-21 ENCOUNTER — OFFICE VISIT (OUTPATIENT)
Dept: PHYSICAL THERAPY | Facility: OTHER | Age: 56
End: 2021-12-21
Payer: COMMERCIAL

## 2021-12-21 DIAGNOSIS — M54.41 CHRONIC RIGHT-SIDED LOW BACK PAIN WITH RIGHT-SIDED SCIATICA: Primary | ICD-10-CM

## 2021-12-21 DIAGNOSIS — G89.29 CHRONIC RIGHT-SIDED LOW BACK PAIN WITH RIGHT-SIDED SCIATICA: Primary | ICD-10-CM

## 2021-12-21 PROCEDURE — 97162 PT EVAL MOD COMPLEX 30 MIN: CPT | Performed by: PHYSICAL THERAPIST

## 2021-12-21 PROCEDURE — 97110 THERAPEUTIC EXERCISES: CPT | Performed by: PHYSICAL THERAPIST

## 2021-12-23 ENCOUNTER — OFFICE VISIT (OUTPATIENT)
Dept: PHYSICAL THERAPY | Facility: OTHER | Age: 56
End: 2021-12-23
Payer: COMMERCIAL

## 2021-12-23 DIAGNOSIS — G89.29 CHRONIC RIGHT-SIDED LOW BACK PAIN WITH RIGHT-SIDED SCIATICA: Primary | ICD-10-CM

## 2021-12-23 DIAGNOSIS — M54.41 CHRONIC RIGHT-SIDED LOW BACK PAIN WITH RIGHT-SIDED SCIATICA: Primary | ICD-10-CM

## 2021-12-23 PROCEDURE — 97140 MANUAL THERAPY 1/> REGIONS: CPT | Performed by: PHYSICAL THERAPIST

## 2021-12-23 PROCEDURE — 97110 THERAPEUTIC EXERCISES: CPT | Performed by: PHYSICAL THERAPIST

## 2021-12-27 ENCOUNTER — OFFICE VISIT (OUTPATIENT)
Dept: PHYSICAL THERAPY | Facility: OTHER | Age: 56
End: 2021-12-27
Payer: COMMERCIAL

## 2021-12-27 DIAGNOSIS — G89.29 CHRONIC RIGHT-SIDED LOW BACK PAIN WITH RIGHT-SIDED SCIATICA: Primary | ICD-10-CM

## 2021-12-27 DIAGNOSIS — M54.41 CHRONIC RIGHT-SIDED LOW BACK PAIN WITH RIGHT-SIDED SCIATICA: Primary | ICD-10-CM

## 2021-12-27 PROCEDURE — 97110 THERAPEUTIC EXERCISES: CPT | Performed by: PHYSICAL THERAPIST

## 2021-12-27 PROCEDURE — 97140 MANUAL THERAPY 1/> REGIONS: CPT | Performed by: PHYSICAL THERAPIST

## 2021-12-27 PROCEDURE — 97112 NEUROMUSCULAR REEDUCATION: CPT | Performed by: PHYSICAL THERAPIST

## 2021-12-28 ENCOUNTER — APPOINTMENT (OUTPATIENT)
Dept: PHYSICAL THERAPY | Facility: OTHER | Age: 56
End: 2021-12-28
Payer: COMMERCIAL

## 2021-12-30 ENCOUNTER — OFFICE VISIT (OUTPATIENT)
Dept: PHYSICAL THERAPY | Facility: OTHER | Age: 56
End: 2021-12-30
Payer: COMMERCIAL

## 2021-12-30 DIAGNOSIS — G89.29 CHRONIC RIGHT-SIDED LOW BACK PAIN WITH RIGHT-SIDED SCIATICA: Primary | ICD-10-CM

## 2021-12-30 DIAGNOSIS — M54.41 CHRONIC RIGHT-SIDED LOW BACK PAIN WITH RIGHT-SIDED SCIATICA: Primary | ICD-10-CM

## 2021-12-30 PROCEDURE — 97110 THERAPEUTIC EXERCISES: CPT | Performed by: PHYSICAL THERAPIST

## 2021-12-30 PROCEDURE — 97140 MANUAL THERAPY 1/> REGIONS: CPT | Performed by: PHYSICAL THERAPIST

## 2021-12-30 PROCEDURE — 97112 NEUROMUSCULAR REEDUCATION: CPT | Performed by: PHYSICAL THERAPIST

## 2022-01-04 ENCOUNTER — OFFICE VISIT (OUTPATIENT)
Dept: PHYSICAL THERAPY | Facility: OTHER | Age: 57
End: 2022-01-04
Payer: COMMERCIAL

## 2022-01-04 DIAGNOSIS — M54.41 CHRONIC RIGHT-SIDED LOW BACK PAIN WITH RIGHT-SIDED SCIATICA: Primary | ICD-10-CM

## 2022-01-04 DIAGNOSIS — G89.29 CHRONIC RIGHT-SIDED LOW BACK PAIN WITH RIGHT-SIDED SCIATICA: Primary | ICD-10-CM

## 2022-01-04 PROCEDURE — 97112 NEUROMUSCULAR REEDUCATION: CPT | Performed by: PHYSICAL THERAPIST

## 2022-01-04 PROCEDURE — 97140 MANUAL THERAPY 1/> REGIONS: CPT | Performed by: PHYSICAL THERAPIST

## 2022-01-04 PROCEDURE — 97110 THERAPEUTIC EXERCISES: CPT | Performed by: PHYSICAL THERAPIST

## 2022-01-04 NOTE — PROGRESS NOTES
Daily Note     Today's date: 22  Patient name: Morgan Marquez  : 1965  MRN: [id number]  Referring provider: Margarito Gleason  Dx:   Encounter Diagnosis     ICD-10-CM    1  Chronic right-sided low back pain with right-sided sciatica  M54 41     G89 29            Subjective: Patient reports she is discouraged and notes overall little improvement reports she feels improved post PT, but discouraged that pain is present upon waking in the AM  Reports that she did schedule a follow up with pain management due to ongoing symptoms  Objective: See treatment diary below    Assessment: Patient pain free throughout today's session   Reports she felt really good at the end of today's session  Discussed importance of addressing mobility deficits upon waking versus going from bed to sitting for work  Patient reports she would like to start waking up 30 minutes earlier to complete her HEP prior to starting     Plan: Continue per plan of care        Precautions: chronic low back pain      Manuals  1/4        L upslip correct   Gr 5          R PI correct   MET 5x5"          Hip IR/ LS Mob    [facility]         Slump Sliders    [facility]         Central Gliders LAD    [facility]                                   Neuro Re-Ed             TA contraction 10 x 10" PBALL  10x10"  10x 10"        bridge 10x 3 x 10 Mod 2x10  3 x 10        Multifidus column press  10 x 10"   10 x 10"        Glut wall press  10 x 5" 10x5" ea          palov press  10 x ea GTB 2x10 ea GTB           Mini squat             Bird dog     2 x 10         Nerve glide     10 x ea        Seated GS    15x         Seated GS STS    15x         Seated GS STS w/ MF P    15x  BTB 15x                                  Ther Ex             TM 5' Curve   5'  5'        Standing ext 10x 2 x 10 2x10  10x        Prone prop  2 x 1'             Prone press up  3 x 10 2x10                                                              Ther Activity Gait Training                                       Modalities

## 2022-01-06 ENCOUNTER — OFFICE VISIT (OUTPATIENT)
Dept: PHYSICAL THERAPY | Facility: OTHER | Age: 57
End: 2022-01-06
Payer: COMMERCIAL

## 2022-01-06 DIAGNOSIS — M54.41 CHRONIC RIGHT-SIDED LOW BACK PAIN WITH RIGHT-SIDED SCIATICA: Primary | ICD-10-CM

## 2022-01-06 DIAGNOSIS — G89.29 CHRONIC RIGHT-SIDED LOW BACK PAIN WITH RIGHT-SIDED SCIATICA: Primary | ICD-10-CM

## 2022-01-06 PROCEDURE — 97110 THERAPEUTIC EXERCISES: CPT | Performed by: PHYSICAL THERAPIST

## 2022-01-06 PROCEDURE — 97112 NEUROMUSCULAR REEDUCATION: CPT | Performed by: PHYSICAL THERAPIST

## 2022-01-06 NOTE — PROGRESS NOTES
Daily Note     Today's date: 22  Patient name: Marga Coburn  : 1965  MRN: [id number]  Referring provider: Cristi Grajeda  Dx:   Encounter Diagnosis     ICD-10-CM    1  Chronic right-sided low back pain with right-sided sciatica  M54 41     G89 29            Subjective: Patient reports slight improvement in soreness today  Reports she does not have any sharp pain or achiness today  Objective: See treatment diary below    Assessment: Patient pain free throughout today's session  Reports she felt good at this time and is feeling more confident in her movement  Progressed core strengthening  Plan: Continue per plan of care        Precautions: chronic low back pain      Manuals        L upslip correct   Gr 5          R PI correct   MET 5x5"          Hip IR/ LS Mob    [facility]         Slump Sliders    [facility]         Central Gliders LAD    [facility]                                   Neuro Re-Ed             PBALL dead bug      10 x       TA contraction 10 x 10" PBALL  10x10"  10x 10" PBALL 10 x 10"       bridge 10x 3 x 10 Mod 2x10  3 x 10 3 x 10       Multifidus column press  10 x 10"   10 x 10" 10 x 10"       Glut wall press  10 x 5" 10x5" ea          palov press  10 x ea GTB 2x10 ea GTB           Mini squat             Bird dog     2 x 10  3 x 10        Nerve glide     10 x ea 10 ea       Seated GS    15x         Seated GS STS    15x         Seated GS STS w/ MF P    15x  BTB 15x 20 x BTB       PBALL TA activation ball rollout on table      10 x                    Ther Ex             TM 5' Curve   5'  5' 10' curve       Standing ext 10x 2 x 10 2x10  10x 10x       Prone prop  2 x 1'             Prone press up  3 x 10 2x10                                                              Ther Activity                                       Gait Training                                       Modalities

## 2022-01-11 ENCOUNTER — OFFICE VISIT (OUTPATIENT)
Dept: PHYSICAL THERAPY | Facility: OTHER | Age: 57
End: 2022-01-11
Payer: COMMERCIAL

## 2022-01-11 DIAGNOSIS — G89.29 CHRONIC RIGHT-SIDED LOW BACK PAIN WITH RIGHT-SIDED SCIATICA: Primary | ICD-10-CM

## 2022-01-11 DIAGNOSIS — M54.41 CHRONIC RIGHT-SIDED LOW BACK PAIN WITH RIGHT-SIDED SCIATICA: Primary | ICD-10-CM

## 2022-01-11 PROCEDURE — 97112 NEUROMUSCULAR REEDUCATION: CPT | Performed by: PHYSICAL THERAPIST

## 2022-01-11 PROCEDURE — 97110 THERAPEUTIC EXERCISES: CPT | Performed by: PHYSICAL THERAPIST

## 2022-01-11 NOTE — PROGRESS NOTES
Daily Note     Today's date: 22  Patient name: Cecilio Yang  : 1965  MRN: [id number]  Referring provider: Charli Brito  Dx:   Encounter Diagnosis     ICD-10-CM    1  Chronic right-sided low back pain with right-sided sciatica  M54 41     G89 29          1 on 1 154-700, not billed remainder    Subjective: Patient reports she is surprised at how good she feels  States she felt pretty good after last session  States she put away all of her [place] decorations this weekend without fear of activity  Noted some minor soreness and some pinching in her back after but, overall is pleased with her increased ability to participate in activities of daily living  Objective: See treatment diary below    Assessment: Patient with good tolerance to today's session progressed glut and core strengthening as charted below  [place] to progress functional activity as tolerated  Plan: Continue per plan of care        Precautions: chronic low back pain      Manuals       L upslip correct   Gr 5          R PI correct   MET 5x5"          Hip IR/ LS Mob    [facility]         Slump Sliders    [facility]         Central Gliders LAD    [facility]                                   Neuro Re-Ed             PBALL dead bug      10 x 2 x 10      TA contraction 10 x 10" PBALL  10x10"  10x 10" PBALL 10 x 10" PBALL 3 x 10      bridge 10x 3 x 10 Mod 2x10  3 x 10 3 x 10 3 x 10      Multifidus column press  10 x 10"   10 x 10" 10 x 10" 10 x 10"      Glut wall press  10 x 5" 10x5" ea          palov press  10 x ea GTB 2x10 ea GTB           Mini squat             Bird dog     2 x 10  3 x 10  3 10       Nerve glide     10 x ea 10 ea       Seated GS    15x         Seated GS STS    15x         Seated GS STS w/ MF P    15x  BTB 15x 20 x BTB 30 x BTB      Sidestep with core activation       10 laps BTB      PBALL TA activation ball rollout on table      10 x 10x                   Ther Ex             TM 5' Curve   5'  5' 10' curve 10' curve      Standing ext 10x 2 x 10 2x10  10x 10x 10x      Prone prop  2 x 1'             Prone press up  3 x 10 2x10                                                              Ther Activity                                       Gait Training                                       Modalities

## 2022-01-13 ENCOUNTER — OFFICE VISIT (OUTPATIENT)
Dept: PHYSICAL THERAPY | Facility: OTHER | Age: 57
End: 2022-01-13
Payer: COMMERCIAL

## 2022-01-13 DIAGNOSIS — M54.41 CHRONIC RIGHT-SIDED LOW BACK PAIN WITH RIGHT-SIDED SCIATICA: Primary | ICD-10-CM

## 2022-01-13 DIAGNOSIS — G89.29 CHRONIC RIGHT-SIDED LOW BACK PAIN WITH RIGHT-SIDED SCIATICA: Primary | ICD-10-CM

## 2022-01-13 PROCEDURE — 97112 NEUROMUSCULAR REEDUCATION: CPT | Performed by: PHYSICAL THERAPIST

## 2022-01-13 PROCEDURE — 97110 THERAPEUTIC EXERCISES: CPT | Performed by: PHYSICAL THERAPIST

## 2022-01-13 NOTE — PROGRESS NOTES
Daily Note     Today's date: 22  Patient name: Abilio Narayanan  : 1965  MRN: [id number]  Referring provider: Nicola Grullon  Dx:   Encounter Diagnosis     ICD-10-CM    1  Chronic right-sided low back pain with right-sided sciatica  M54 41     G89 29          1 on 1 [id number], not billed remainder    Subjective: Patient reports she had a stressful day yesterday and woke up with increased pain  However, reports once she started moving she felt better  Believes stress and tension contributed to her increase pain  Notes she also did not complete her HEP prior to starting the day and does notes she feels best after completing her HEP  Objective: See treatment diary below    Assessment: Patient with good tolerance to today's session progressed glut and core strengthening as charted below  Mulugetaue to progress functional activity as tolerated  Did not progress today, due to reported increased soreness earlier today  However, patient able to resume full program pain free  Patient noting "I would not be able to do this if things were regressing"  Patient overall pleased with progress despite minor soreness earlier this week  Plan: Continue per plan of care        Precautions: chronic low back pain      Manuals      L upslip correct   Gr 5          R PI correct   MET 5x5"          Hip IR/ LS Mob    [facility]         Slump Sliders    [facility]         Central Gliders LAD    [facility]                                   Neuro Re-Ed             PBALL dead bug      10 x 2 x 10 3 x 10     TA contraction 10 x 10" PBALL  10x10"  10x 10" PBALL 10 x 10" PBALL 3 x 10 PBALL 3 x 10     bridge 10x 3 x 10 Mod 2x10  3 x 10 3 x 10 3 x 10 3 x 10, PBALL, knee ext     Multifidus column press  10 x 10"   10 x 10" 10 x 10" 10 x 10" 10 x 10"     Glut wall press  10 x 5" 10x5" ea          palov press  10 x ea GTB 2x10 ea GTB           Mini squat             Bird dog     2 x 10  3 x 10  3 x10 3 x 10      Nerve glide     10 x ea 10 ea       Seated GS    15x         Seated GS STS    15x         Seated GS STS w/ MF P    15x  BTB 15x 20 x BTB 30 x BTB 30x BTB     Sidestep with core activation       10 laps BTB 10 laps BTB     PBALL TA activation ball rollout on table      10 x 10x 10x                  Ther Ex             TM 5' Curve   5'  5' 10' curve 10' curve 10' curve     Standing ext 10x 2 x 10 2x10  10x 10x 10x      Prone prop  2 x 1'             Prone press up  3 x 10 2x10                                                              Ther Activity                                       Gait Training                                       Modalities

## 2022-01-14 ENCOUNTER — APPOINTMENT (OUTPATIENT)
Dept: RADIOLOGY | Facility: OTHER | Age: 57
End: 2022-01-14
Payer: COMMERCIAL

## 2022-01-14 ENCOUNTER — OFFICE VISIT (OUTPATIENT)
Dept: OBGYN CLINIC | Facility: OTHER | Age: 57
End: 2022-01-14
Payer: COMMERCIAL

## 2022-01-14 VITALS
HEART RATE: 80 BPM | HEIGHT: 61 IN | SYSTOLIC BLOOD PRESSURE: 126 MMHG | WEIGHT: 188.2 LBS | BODY MASS INDEX: 35.53 KG/M2 | DIASTOLIC BLOOD PRESSURE: 80 MMHG

## 2022-01-14 DIAGNOSIS — M54.16 RIGHT LUMBAR RADICULOPATHY: ICD-10-CM

## 2022-01-14 DIAGNOSIS — F41.9 ANXIETY: ICD-10-CM

## 2022-01-14 DIAGNOSIS — M54.16 RIGHT LUMBAR RADICULOPATHY: Primary | ICD-10-CM

## 2022-01-14 PROCEDURE — 72100 X-RAY EXAM L-S SPINE 2/3 VWS: CPT

## 2022-01-14 PROCEDURE — 99214 OFFICE O/P EST MOD 30 MIN: CPT | Performed by: ORTHOPAEDIC SURGERY

## 2022-01-14 RX ORDER — METHYLPREDNISOLONE 4 MG/1
TABLET ORAL
Qty: 21 TABLET | Refills: 0 | Status: SHIPPED | OUTPATIENT
Start: 2022-01-14 | End: 2022-04-13

## 2022-01-14 RX ORDER — METHYLPREDNISOLONE 4 MG/1
TABLET ORAL
Qty: 21 TABLET | Refills: 0 | Status: SHIPPED | OUTPATIENT
Start: 2022-01-14 | End: 2022-01-14

## 2022-01-14 NOTE — PROGRESS NOTES
Chief Complaint:  Low back pain    HPI:    Payton Choe is a 60year old Female who presents today for evaluation of low back pain    Description of symptoms: Jayleen Harmon reports several years of low back pain which got worse approximately 3 months ago  She denies any trauma prior to the onset of her symptoms or prior to the recent worsening  She has been doing physical therapy in this regard without much improvement so far  She has also tried oral ibuprofen without much improvement  Pain is described as an aching sensation of the right lower back that radiates to the right gluteal area and intermittently down to her right ankle and medial foot  She also reports occasional "pins and needles" sensation of the right lower extremity  She denies any new onset lower extremity weakness  Denies any new onset sudden loss of bowel or bladder control, recent sudden loss of weight or appetite or systemic symptoms like fever or chills  Patient does provide a history of anxiety and mentions that her back symptoms significantly worsen during periods of anxiety  Patient already has an appointment scheduled with pain management for next month with regards to her low back pain  I have personally reviewed pertinent films in PACS and my interpretation is Plain radiograph of the lumbar spine reveals mild lumbar scoliosis with left-sided convexity  No acute osseous abnormalities noted  Mild degenerative changes noted at the L4-L5 level       Patient Active Problem List   Diagnosis    Adverse reaction to statin medication    Arthritis    Asthma    Colon polyps    GERD (gastroesophageal reflux disease)    Hyperlipemia, mixed    Essential hypertension    IBS (irritable bowel syndrome)    Nodule of apex of right lung    TB lung fibrosis, exam unkn    Class 1 obesity due to excess calories with serious comorbidity and body mass index (BMI) of 34 0 to 34 9 in adult    Spinal stenosis in cervical region    Former consumption of alcohol    Dense breast    Pleurisy    Cervical radiculopathy    DDD (degenerative disc disease), cervical    Acute suppurative otitis media of right ear without spontaneous rupture of tympanic membrane    Cervical disc disorder at C5-C6 level with radiculopathy    Chronic pain syndrome    Thoracic spine pain    Hepatic steatosis    History of colon polyps        Current Outpatient Medications on File Prior to Visit   Medication Sig Dispense Refill    acetaminophen (TYLENOL) 650 mg CR tablet Take 650 mg by mouth every 8 (eight) hours as needed for mild pain      Calcium Carbonate-Vitamin D (CALCIUM 600+D HIGH POTENCY PO) Take by mouth daily      Cholecalciferol (VITAMIN D3) 5000 units CAPS Take by mouth daily      famotidine (PEPCID) 40 MG tablet Take 1 tablet by mouth daily as needed      Flaxseed, Linseed, (FLAX SEED OIL) 1000 MG CAPS Take 2 capsules by mouth daily       fluticasone (FLONASE) 50 mcg/act nasal spray 2 sprays into each nostril daily (Patient taking differently: 2 sprays into each nostril daily as needed  ) 16 g 0    gabapentin (NEURONTIN) 300 mg capsule Take 1 capsule (300 mg total) by mouth 3 (three) times a day 90 capsule 0    ibuprofen (MOTRIN) 200 mg tablet Take by mouth every 6 (six) hours as needed for mild pain       losartan-hydrochlorothiazide (HYZAAR) 50-12 5 mg per tablet Take 1 tablet by mouth daily 90 tablet 0    losartan-hydrochlorothiazide (HYZAAR) 50-12 5 mg per tablet Take 1 tablet by mouth daily 90 tablet 0    meloxicam (MOBIC) 7 5 mg tablet Take 1 tablet (7 5 mg total) by mouth daily (Patient not taking: Reported on 1/14/2022 ) 14 tablet 0    Na Sulfate-K Sulfate-Mg Sulf (Suprep Bowel Prep Kit) 17 5-3 13-1 6 GM/177ML SOLN Take 2 Bottles by mouth see administration instructions Please follow the instructions from the office 177 mL 0    naloxone (NARCAN) 4 mg/0 1 mL nasal spray Administer 1 spray into a nostril   If breathing does not return to normal or if breathing difficulty resumes after 2-3 minutes, give another dose in the other nostril using a new spray  1 each 1     No current facility-administered medications on file prior to visit  Allergies   Allergen Reactions    Banana - Food Allergy GI Intolerance    Statins Arthralgia        Tobacco Use: Medium Risk    Smoking Tobacco Use: Former Smoker    Smokeless Tobacco Use: Never Used        Social Determinants of Health     Tobacco Use: Medium Risk    Smoking Tobacco Use: Former Smoker    Smokeless Tobacco Use: Never Used   Alcohol Use: Not on file   Financial Resource Strain: Not on file   Food Insecurity: Not on file   Transportation Needs: Not on file   Physical Activity: Not on file   Stress: Not on file   Social Connections: Not on file   Intimate Partner Violence: Not on file   Depression: At risk    PHQ-2 Score: 4   Housing Stability: Not on file               Review of Systems   Constitutional: Negative  HENT: Negative  Eyes: Negative  Respiratory: Negative  Cardiovascular: Negative  Gastrointestinal: Negative  Endocrine: Negative  Genitourinary: Negative  Skin: Negative  Allergic/Immunologic: Negative  Neurological: Negative  Hematological: Negative  Psychiatric/Behavioral: The patient is nervous/anxious  Body mass index is 35 56 kg/m²  Physical Exam  Vitals and nursing note reviewed  Constitutional:       General: She is not in acute distress  Appearance: She is well-developed  HENT:      Head: Normocephalic and atraumatic  Eyes:      Conjunctiva/sclera: Conjunctivae normal    Cardiovascular:      Rate and Rhythm: Normal rate and regular rhythm  Heart sounds: No murmur heard  Pulmonary:      Effort: Pulmonary effort is normal  No respiratory distress  Breath sounds: Normal breath sounds  Abdominal:      Palpations: Abdomen is soft  Tenderness: There is no abdominal tenderness     Musculoskeletal: Cervical back: Neck supple  Skin:     General: Skin is warm and dry  Neurological:      Mental Status: She is alert  Ortho Exam:    Body part: Lumbar spine    Inspection:  Mild lumbar left-sided scoliosis    Palpation:  Tender to palpation over lower back at the L4-L5 level  Range of motion:  Limited lumbar spine flexion  Special Tests:  SLR is negative on the left and positive on the right at 70°  CHUCKY does not induce any significant lower back discomfort  Has mild paresthesia to light touch in right L4 and L5 dermatomal distribution  No focal myotomal deficit noted in bilaterally symmetrical ankle and knee deep tendon reflexes  Procedures       Assessment:     Diagnosis ICD-10-CM Associated Orders   1  Right lumbar radiculopathy  M54 16 methylPREDNISolone 4 MG tablet therapy pack     MRI lumbar spine wo contrast     XR spine lumbar 2 or 3 views injury   2  Anxiety  F41 9 Ambulatory Referral to Psychology        Plan:    I explained my current clinical findings and reviewed lumbar spine radiographic findings with the patient  I suspect that she likely has right-sided L4 and/or L5 radiculopathy  Symptoms have persisted despite conservative management and hence I will request an MRI of the lumbar spine for further assessment  Additionally, I will prescribe her course of oral Medrol Dosepak for symptomatic relief  She suggested to continue with physical therapy in this regard  Follow-up after lumbar spine MRI  Portions of the record may have been created with voice recognition software  Occasional wrong word or "sound alike" substitutions may have occurred due to the inherent limitations of voice recognition software  Please review the chart carefully and recognize, using context, where substitutions/typographical errors may have occurred

## 2022-01-18 ENCOUNTER — OFFICE VISIT (OUTPATIENT)
Dept: PHYSICAL THERAPY | Facility: OTHER | Age: 57
End: 2022-01-18
Payer: COMMERCIAL

## 2022-01-18 DIAGNOSIS — M54.41 CHRONIC RIGHT-SIDED LOW BACK PAIN WITH RIGHT-SIDED SCIATICA: Primary | ICD-10-CM

## 2022-01-18 DIAGNOSIS — G89.29 CHRONIC RIGHT-SIDED LOW BACK PAIN WITH RIGHT-SIDED SCIATICA: Primary | ICD-10-CM

## 2022-01-18 PROCEDURE — 97110 THERAPEUTIC EXERCISES: CPT | Performed by: PHYSICAL THERAPIST

## 2022-01-18 PROCEDURE — 97112 NEUROMUSCULAR REEDUCATION: CPT | Performed by: PHYSICAL THERAPIST

## 2022-01-18 NOTE — PROGRESS NOTES
Daily Note     Today's date: 22  Patient name: Rhona Keller  : 1965  MRN: [id number]  Referring provider: Mireya Hinton  Dx:   Encounter Diagnosis     ICD-10-CM    1  Chronic right-sided low back pain with right-sided sciatica  M54 41     G89 29          1 on 1 for entirety    Subjective: Patient reports overall she is feeling good  Reports follow up with primary care sports medicine physician, Dr Hansel Yanes since last visit  States Dr Hansel Yanes completed x-rays  Plain radiographs revealed osteophytes, mild disc space narrowing, and mil scoliosis  Reports she was instructed to continue with PT and placed on a medrol dose pack  States she has noted significant improvement with dose pack and feels as though spasm has decreased and ease with completing her PT program      Objective: See treatment diary below    Assessment: Patient with good tolerance to today's session progressed glut and core strengthening as charted below  Cotgregorionue to progress functional activity as tolerated  Patient demonstrating improved functional ability and decreased pain levels throughout today's session  Plan: Continue per plan of care        Precautions: chronic low back pain      Manuals     L upslip correct   Gr 5          R PI correct   MET 5x5"          Hip IR/ LS Mob    [facility]         Slump Sliders    [facility]         Central Gliders LAD    [facility]                                   Neuro Re-Ed             PBALL dead bug      10 x 2 x 10 3 x 10 3 x 10    TA contraction 10 x 10" PBALL  10x10"  10x 10" PBALL 10 x 10" PBALL 3 x 10 PBALL 3 x 10 3 x 10 PBALL    bridge 10x 3 x 10 Mod 2x10  3 x 10 3 x 10 3 x 10 3 x 10, PBALL, knee ext 3 x 10 PBALL    Multifidus column press  10 x 10"   10 x 10" 10 x 10" 10 x 10" 10 x 10" 5 x 20"    Glut wall press  10 x 5" 10x5" ea      5 x 20"    palov press  10 x ea GTB 2x10 ea GTB       3 x 10 plum    Mini squat         10#KB 2 x 10    Bird dog     2 x 10  3 x 10  3 x10  3 x 10  3 x 10     Nerve glide     10 x ea 10 ea   3 x 10    Seated GS    15x         Seated GS STS    15x         Seated GS STS w/ MF P    15x  BTB 15x 20 x BTB 30 x BTB 30x BTB 30 x plum    Sidestep with core activation       10 laps BTB 10 laps BTB 10 laps plum    PBALL TA activation ball rollout on table      10 x 10x 10x                  Ther Ex             TM 5' Curve   5'  5' 10' curve 10' curve 10' curve 10' curve    Standing ext 10x 2 x 10 2x10  10x 10x 10x      Prone prop  2 x 1'             Prone press up  3 x 10 2x10                                                              Ther Activity                                       Gait Training                                       Modalities

## 2022-01-20 ENCOUNTER — OFFICE VISIT (OUTPATIENT)
Dept: PHYSICAL THERAPY | Facility: OTHER | Age: 57
End: 2022-01-20
Payer: COMMERCIAL

## 2022-01-20 DIAGNOSIS — G89.29 CHRONIC RIGHT-SIDED LOW BACK PAIN WITH RIGHT-SIDED SCIATICA: Primary | ICD-10-CM

## 2022-01-20 DIAGNOSIS — M54.41 CHRONIC RIGHT-SIDED LOW BACK PAIN WITH RIGHT-SIDED SCIATICA: Primary | ICD-10-CM

## 2022-01-20 PROCEDURE — 97112 NEUROMUSCULAR REEDUCATION: CPT | Performed by: PHYSICAL THERAPIST

## 2022-01-20 PROCEDURE — 97110 THERAPEUTIC EXERCISES: CPT | Performed by: PHYSICAL THERAPIST

## 2022-01-20 NOTE — PROGRESS NOTES
Daily Note     Today's date: 22  Patient name: Morgan Marquez  : 1965  MRN: [id number]  Referring provider: Margarito Gleason  Dx:   Encounter Diagnosis     ICD-10-CM    1  Chronic right-sided low back pain with right-sided sciatica  M54 41     G89 29          1 on 1 from [phone]    Subjective: Patient reports that she finished her steroid pack since last visit and has noted return of symptoms, specifically pinching with sit to stand and getting out of bed in the AM  However, notes she has been able to complete her HEP and full routine which does help alleviate her symptoms  Objective: See treatment diary below    Assessment: Patient with good tolerance to today's session  Reporting she feels much better after PT  And notes if she had regressed she would not be able to complete her full program      Plan: Continue per plan of care        Precautions: chronic low back pain      Manuals     L upslip correct  Gr 5          R PI correct  MET 5x5"          Hip IR/ LS Mob   [facility]         Slump Sliders   [facility]         Central Gliders LAD   [facility]                                 Neuro Re-Ed            PBALL dead bug     10 x 2 x 10 3 x 10 3 x 10    TA contraction PBALL  10x10"  10x 10" PBALL 10 x 10" PBALL 3 x 10 PBALL 3 x 10 3 x 10 PBALL    bridge 3 x 10 Mod 2x10  3 x 10 3 x 10 3 x 10 3 x 10, PBALL, knee ext 3 x 10 PBALL    Multifidus column press 10 x 10"   10 x 10" 10 x 10" 10 x 10" 10 x 10" 5 x 20" 5 x 20"   Glut wall press 10 x 5" 10x5" ea      5 x 20" 5 x 20"   palov press 10 x ea GTB 2x10 ea GTB       3 x 10 plum 3 x 10 plum   Mini squat        10#KB 2 x 10 10# 3 x 10   Bird dog    2 x 10  3 x 10  3 x10  3 x 10  3 x 10  3 x 10, on 18" box   Nerve glide    10 x ea 10 ea   3 x 10 3 x 10    Seated GS   15x         Seated GS STS   15x         Seated GS STS w/ MF P   15x  BTB 15x 20 x BTB 30 x BTB 30x BTB 30 x plum 3 x 10 plum   Sidestep with core activation      10 laps BTB 10 laps BTB 10 laps plum 10 laps plum   PBALL TA activation ball rollout on table     10 x 10x 10x  10 x on 18" box               Ther Ex            TM Curve   5'  5' 10' curve 10' curve 10' curve 10' curve 10' curve   Standing ext 2 x 10 2x10  10x 10x 10x      Prone prop             Prone press up 3 x 10 2x10                                                          Ther Activity                                    Gait Training                                    Modalities

## 2022-01-25 ENCOUNTER — OFFICE VISIT (OUTPATIENT)
Dept: PHYSICAL THERAPY | Facility: OTHER | Age: 57
End: 2022-01-25
Payer: COMMERCIAL

## 2022-01-25 DIAGNOSIS — G89.29 CHRONIC RIGHT-SIDED LOW BACK PAIN WITH RIGHT-SIDED SCIATICA: Primary | ICD-10-CM

## 2022-01-25 DIAGNOSIS — M54.41 CHRONIC RIGHT-SIDED LOW BACK PAIN WITH RIGHT-SIDED SCIATICA: Primary | ICD-10-CM

## 2022-01-25 PROCEDURE — 97112 NEUROMUSCULAR REEDUCATION: CPT | Performed by: PHYSICAL THERAPIST

## 2022-01-25 PROCEDURE — 97110 THERAPEUTIC EXERCISES: CPT | Performed by: PHYSICAL THERAPIST

## 2022-01-25 NOTE — PROGRESS NOTES
Daily Note     Today's date: 22  Patient name: Darryl Nichols  : 1965  MRN: [id number]  Referring provider: Boris Boeck  Dx:   Encounter Diagnosis     ICD-10-CM    1  Chronic right-sided low back pain with right-sided sciatica  M54 41     G89 29          1 on 1 from [phone]    Subjective: Patient reports that she is slowly noting improvement in her back pain  States she is feeling "a little bit better"  Objective: See treatment diary below    Assessment: Patient with good tolerance to today's session  Reporting she feels much better after PT  And notes if she had regressed she would not be able to complete her full program      Plan: Continue per plan of care        Precautions: chronic low back pain      Manuals    L upslip correct  Gr 5           R PI correct  MET 5x5"           Hip IR/ LS Mob   [facility]          Slump Sliders   [facility]          Central Gliders LAD   [facility]                                    Neuro Re-Ed             PBALL dead bug     10 x 2 x 10 3 x 10 3 x 10  3 x 10    TA contraction PBALL  10x10"  10x 10" PBALL 10 x 10" PBALL 3 x 10 PBALL 3 x 10 3 x 10 PBALL     bridge 3 x 10 Mod 2x10  3 x 10 3 x 10 3 x 10 3 x 10, PBALL, knee ext 3 x 10 PBALL  3 x 10 PBALL   Multifidus column press 10 x 10"   10 x 10" 10 x 10" 10 x 10" 10 x 10" 5 x 20" 5 x 20" 5 x 20"   Glut wall press 10 x 5" 10x5" ea      5 x 20" 5 x 20" 5 x 20"   palov press 10 x ea GTB 2x10 ea GTB       3 x 10 plum 3 x 10 plum 3 x 10 plum   Mini squat        10#KB 2 x 10 10# 3 x 10 10# 3 x 10   Bird dog    2 x 10  3 x 10  3 x10  3 x 10  3 x 10  3 x 10, on 18" box 3 x 10, on 18" box   Nerve glide    10 x ea 10 ea   3 x 10 3 x 10  3 x 10    Seated GS   15x          Seated GS STS   15x          Seated GS STS w/ MF P   15x  BTB 15x 20 x BTB 30 x BTB 30x BTB 30 x plum 3 x 10 plum 3 x 10 plum   Sidestep with core activation      10 laps BTB 10 laps BTB 10 laps plum 10 laps plum 10 laps plum   PBALL TA activation ball rollout on table     10 x 10x 10x  10 x on 18" box 10 x on 18" box                Ther Ex             TM Curve   5'  5' 10' curve 10' curve 10' curve 10' curve 10' curve 10' curve   Standing ext 2 x 10 2x10  10x 10x 10x       Prone prop              Prone press up 3 x 10 2x10                                                               Ther Activity                                       Gait Training                                       Modalities

## 2022-01-26 ENCOUNTER — OFFICE VISIT (OUTPATIENT)
Dept: PHYSICAL THERAPY | Facility: OTHER | Age: 57
End: 2022-01-26
Payer: COMMERCIAL

## 2022-01-26 DIAGNOSIS — G89.29 CHRONIC RIGHT-SIDED LOW BACK PAIN WITH RIGHT-SIDED SCIATICA: Primary | ICD-10-CM

## 2022-01-26 DIAGNOSIS — M54.41 CHRONIC RIGHT-SIDED LOW BACK PAIN WITH RIGHT-SIDED SCIATICA: Primary | ICD-10-CM

## 2022-01-26 PROCEDURE — 97110 THERAPEUTIC EXERCISES: CPT | Performed by: PHYSICAL THERAPIST

## 2022-01-26 PROCEDURE — 97112 NEUROMUSCULAR REEDUCATION: CPT | Performed by: PHYSICAL THERAPIST

## 2022-01-26 NOTE — PROGRESS NOTES
Daily Note     Today's date: 22  Patient name: Esteban Chowdary  : 1965  MRN: [id number]  Referring provider: Davis Barber  Dx:   Encounter Diagnosis     ICD-10-CM    1  Chronic right-sided low back pain with right-sided sciatica  M54 41     G89 29          1 on 1 from 300-640, not billed remainder    Subjective: Patient reports that today is not a good day  Reports going for a long car ride after her appointment yesterday  States that she came home from the car ride in spasm and did not attempt to complete HEP  Reports she also woke up stiff this AM and was late for work therefore did not complete her home program then either  Objective: See treatment diary below    Assessment: Patient with good tolerance to today's session  Noting decreased pain throughout session  Discussed importance of completing HEP to help decrease pain and improve function  Plan: Continue per plan of care        Precautions: chronic low back pain      Manuals    L upslip correct            R PI correct            Hip IR/ LS Mob [facility]           Slump Sliders [facility]           Central Gliders LAD [facility]                                   Neuro Re-Ed            Bridge with add iso         2 x 10, 5"   Bridge with abd iso         2 x 10, 5"   PBALL dead bug   10 x 2 x 10 3 x 10 3 x 10  3 x 10  3 x 10    TA contraction  10x 10" PBALL 10 x 10" PBALL 3 x 10 PBALL 3 x 10 3 x 10 PBALL      bridge  3 x 10 3 x 10 3 x 10 3 x 10, PBALL, knee ext 3 x 10 PBALL  3 x 10 PBALL 3 x 10 PBALL   Multifidus column press  10 x 10" 10 x 10" 10 x 10" 10 x 10" 5 x 20" 5 x 20" 5 x 20" 5 x 20"   Glut wall press      5 x 20" 5 x 20" 5 x 20" 5 x 20"   palov press      3 x 10 plum 3 x 10 plum 3 x 10 plum 3 x 10 plum   Mini squat      10#KB 2 x 10 10# 3 x 10 10# 3 x 10    Bird dog  2 x 10  3 x 10  3 x10  3 x 10  3 x 10  3 x 10, on 18" box 3 x 10, on 18" box    Nerve glide  10 x ea 10 ea   3 x 10 3 x 10  3 x 10 3 x 10    Seated GS 15x           Seated GS STS 15x           Seated GS STS w/ MF P 15x  BTB 15x 20 x BTB 30 x BTB 30x BTB 30 x plum 3 x 10 plum 3 x 10 plum 10 laps plum   Sidestep with core activation    10 laps BTB 10 laps BTB 10 laps plum 10 laps plum 10 laps plum 10 laps plum   PBALL TA activation ball rollout on table   10 x 10x 10x  10 x on 18" box 10 x on 18" box                Ther Ex            TM  5' 10' curve 10' curve 10' curve 10' curve 10' curve 10' curve    Standing ext  10x 10x 10x        Prone prop             Prone press up                                                            Ther Activity                                    Gait Training                                    Modalities

## 2022-01-27 ENCOUNTER — APPOINTMENT (OUTPATIENT)
Dept: PHYSICAL THERAPY | Facility: OTHER | Age: 57
End: 2022-01-27
Payer: COMMERCIAL

## 2022-02-07 ENCOUNTER — HOSPITAL ENCOUNTER (OUTPATIENT)
Dept: RADIOLOGY | Age: 57
Discharge: HOME | End: 2022-02-07
Payer: COMMERCIAL

## 2022-02-07 DIAGNOSIS — M54.16 RIGHT LUMBAR RADICULOPATHY: ICD-10-CM

## 2022-02-07 PROCEDURE — G1004 CDSM NDSC: HCPCS

## 2022-02-07 PROCEDURE — 72148 MRI LUMBAR SPINE W/O DYE: CPT

## 2022-02-07 NOTE — PROGRESS NOTES
PT Re-Evaluation     Today's date: 2022  Patient name: Nancy Rocha  : 1965  MRN: [id number]  Referring provider: Ana Echevarria  Dx:   Encounter Diagnosis     ICD-10-CM    1  Chronic right-sided low back pain with right-sided sciatica  M54 41     G89 29        Start Time: 1430  Stop Time: 1525  Total time in clinic (min): 55 minutes    Assessment  Assessment details: Nancy Rocha is a pleasant 60 y o  female who initially presented to outpatient PT with a history of chronic low back pain  She demonstrates improvements core and glut stabilization, improvements lumbar and hip ROM  However, she does not yet demonstrate full normal lumbar ROM or full normal strength which continues to contribute to her chronic back pain  She reports feeling the best immediately post PT and would benefit from further progression of glut/core stability as well as functional training  The primary movement problem is LBP with movement coordination impairments  limiting her ability to exercise or recreation, get out of a chair, perform household chores, sit, sleep, squat to  objects from the floor and stand  No further referral appears necessary at this time based upon examination results  The patient's greatest concerns are fear of not being able to keep active and future ill health (and wanting to prevent it)  Problem List:  1) decreased lumbar ROM  2) decreased glut/core strength  3) LBP      Impairments: abnormal or restricted ROM, impaired physical strength, lacks appropriate home exercise program, pain with function and poor body mechanics  Prognosis details: Positive prognostic indicators include positive attitude toward recovery  Negative prognostic indicators include chronicity of symptoms, anxiety, hypertension, high symptom irritability and obesity  Goals  STG's to be achieved in 4 weeks:  1) Patient will demonstrate normal pain free lumbar spine ROM   - partially met  2) Patient will improve glut and core strength by 1/2 muscle grade  - partially met  3) Patient will report no greater than 2/10 LBP with transition from sit to stand after sitting for 30 minutes or greater- partially met  4) Patient will report no greater than 2/10 and no symptoms of feeling stuck upon waking in the AM  - not met    LTG's to be achieved in 8 weeks:  1) Patient will be independent and compliant with HEP  -partially met  2) Patient will report no greater than 2/10 LBP with return to karate and recreational gym activity  - not met  3) Patient will score 70 or greater on FOTO  - not met    Plan  Frequency: 2x week  Duration in weeks: 8        Subjective Evaluation    History of Present Illness  Date of onset: 2021  Mechanism of injury: Patient reports a chronic history of low back pain pinpoints to R SIJ and R side of lumbar spine  Reports pain recently worsened in the past couple of months  Notes she feels stiff and "crooked" when she wakes up  Reports leaning to L  Also, notes getting stuck on the floor after reaching with her vacuum  Reports a seasonal depression that has prevented her from seeking care sooner  But, feels it is now time to address her chronic pain  Has recently joined a gym and is eager to return to the gym  Sits all day for work ~60 hours a week  States sometimes the pain travels up and down her spine  Reports a prior MRI showing degenerative disc disease  Reports straightening her back and standing against a wall for support has helped to decrease spasm  Patient reports current stress levels with family issues are also exacerbating her pain  States at the onset of her recent exacerbation was after a major family issue and ongoing stress has not helped      Pain  Current pain rating: 3  At best pain rating: 3  At worst pain ratin    Patient Goals  Patient goals for therapy: decreased pain, independence with ADLs/IADLs, increased motion and return to sport/leisure activities          Objective     General Comments:      Lumbar Comments  ROM: extension-  75%hensley, flexion-  50%hensley, R SB-   75%hensley, L SB-  75%hensley, R rot-   50%hensley, L rot-   50%hensley  Repeated ROM: FIS-(increased produced), EIS- (increased, centralized), EIL-( decreased, centralized, abolished)  Palpation: spasm in R QL  Joint Mobility: hypomobile lumbar spine and b/l SIJ, hypomobile tspine  Dermatomes: WNL   Myotomes:  WNL  Reflexes: L4-(2+) , S1-(2+)  Special Tests:   Lumbar-SLR-(+), Crossed SLR-(+), Slump Test- (+),               Precautions: chronic low back pain  See daily note for today's treatment

## 2022-02-08 ENCOUNTER — APPOINTMENT (OUTPATIENT)
Dept: PHYSICAL THERAPY | Facility: OTHER | Age: 57
End: 2022-02-08
Payer: COMMERCIAL

## 2022-02-10 ENCOUNTER — OFFICE VISIT (OUTPATIENT)
Dept: PHYSICAL THERAPY | Facility: OTHER | Age: 57
End: 2022-02-10
Payer: COMMERCIAL

## 2022-02-10 DIAGNOSIS — G89.29 CHRONIC RIGHT-SIDED LOW BACK PAIN WITH RIGHT-SIDED SCIATICA: Primary | ICD-10-CM

## 2022-02-10 DIAGNOSIS — M54.41 CHRONIC RIGHT-SIDED LOW BACK PAIN WITH RIGHT-SIDED SCIATICA: Primary | ICD-10-CM

## 2022-02-10 PROCEDURE — 97110 THERAPEUTIC EXERCISES: CPT | Performed by: PHYSICAL THERAPIST

## 2022-02-10 PROCEDURE — 97112 NEUROMUSCULAR REEDUCATION: CPT | Performed by: PHYSICAL THERAPIST

## 2022-02-10 NOTE — PROGRESS NOTES
Daily Note     Today's date: 2/10/2022  Patient name: Ashlee Storey  : 1965  MRN: [id number]  Referring provider: Snow Duff  Dx:   Encounter Diagnosis     ICD-10-CM    1  Chronic right-sided low back pain with right-sided sciatica  M54 41     G89 29        Start Time: 1715  Stop Time: 1815  Total time in clinic (min): 60 minutes  1 on 1 from 341-545, not billed remainder    Subjective: Patent reports she has good days and bad  States there are times when she does not even notice pain in her back  However, reports frustration that she has pain at random times with bending  Objective: See treatment diary below      Assessment: Tolerated treatment well  Patient demonstrated fatigue post treatment, exhibited good technique with therapeutic exercises and would benefit from continued PT  Patient able to resume full routine without difficulty  Plan: Continue per plan of care        Precautions: chronic low back pain      Manuals  2/10   L upslip correct            R PI correct            Hip IR/ LS Mob            Slump Sliders            Central Gliders LAD                                    Neuro Re-Ed            Bridge with add iso        2 x 10, 5"    Bridge with abd iso        2 x 10, 5"    PBALL dead bug  10 x 2 x 10 3 x 10 3 x 10  3 x 10  3 x 10  3 x 10   TA contraction 10x 10" PBALL 10 x 10" PBALL 3 x 10 PBALL 3 x 10 3 x 10 PBALL    3 x 10 PBALL   bridge 3 x 10 3 x 10 3 x 10 3 x 10, PBALL, knee ext 3 x 10 PBALL  3 x 10 PBALL 3 x 10 PBALL 3 x 10 PBALL   Multifidus column press 10 x 10" 10 x 10" 10 x 10" 10 x 10" 5 x 20" 5 x 20" 5 x 20" 5 x 20" 10 x 20"   Glut wall press     5 x 20" 5 x 20" 5 x 20" 5 x 20" 10 x 20"   palov press     3 x 10 plum 3 x 10 plum 3 x 10 plum 3 x 10 plum 3 x 10 plum   Mini squat     10#KB 2 x 10 10# 3 x 10 10# 3 x 10     Bird dog 2 x 10  3 x 10  3 x10  3 x 10  3 x 10  3 x 10, on 18" box 3 x 10, on 18" box  3 x 10, 18" Nerve glide 10 x ea 10 ea   3 x 10 3 x 10  3 x 10  3 x 10     Seated GS            Seated GS STS            Seated GS STS w/ MF P 15x 20 x BTB 30 x BTB 30x BTB 30 x plum 3 x 10 plum 3 x 10 plum 10 laps plum 3 x 10 plum   Sidestep with core activation   10 laps BTB 10 laps BTB 10 laps plum 10 laps plum 10 laps plum 10 laps plum 10 laps plum   PBALL TA activation ball rollout on table  10 x 10x 10x  10 x on 18" box 10 x on 18" box  10 x 18" box               Ther Ex            TM 5' 10' curve 10' curve 10' curve 10' curve 10' curve 10' curve     Standing ext 10x 10x 10x         Prone prop             Prone press up                                                            Ther Activity                                    Gait Training                                    Modalities

## 2022-02-15 ENCOUNTER — OFFICE VISIT (OUTPATIENT)
Dept: PHYSICAL THERAPY | Facility: OTHER | Age: 57
End: 2022-02-15
Payer: COMMERCIAL

## 2022-02-15 DIAGNOSIS — G89.29 CHRONIC RIGHT-SIDED LOW BACK PAIN WITH RIGHT-SIDED SCIATICA: Primary | ICD-10-CM

## 2022-02-15 DIAGNOSIS — M54.41 CHRONIC RIGHT-SIDED LOW BACK PAIN WITH RIGHT-SIDED SCIATICA: Primary | ICD-10-CM

## 2022-02-15 PROCEDURE — 97112 NEUROMUSCULAR REEDUCATION: CPT | Performed by: PHYSICAL THERAPIST

## 2022-02-15 PROCEDURE — 97110 THERAPEUTIC EXERCISES: CPT | Performed by: PHYSICAL THERAPIST

## 2022-02-15 PROCEDURE — 97140 MANUAL THERAPY 1/> REGIONS: CPT | Performed by: PHYSICAL THERAPIST

## 2022-02-17 ENCOUNTER — TELEPHONE (OUTPATIENT)
Dept: NEUROLOGY | Facility: CLINIC | Age: 57
End: 2022-02-17

## 2022-02-18 ENCOUNTER — OFFICE VISIT (OUTPATIENT)
Dept: PAIN MEDICINE | Facility: CLINIC | Age: 57
End: 2022-02-18
Payer: COMMERCIAL

## 2022-02-18 VITALS
HEIGHT: 61 IN | SYSTOLIC BLOOD PRESSURE: 152 MMHG | BODY MASS INDEX: 34.74 KG/M2 | WEIGHT: 184 LBS | DIASTOLIC BLOOD PRESSURE: 103 MMHG

## 2022-02-18 DIAGNOSIS — M54.16 LUMBAR RADICULOPATHY: Primary | ICD-10-CM

## 2022-02-18 DIAGNOSIS — M51.36 DDD (DEGENERATIVE DISC DISEASE), LUMBAR: ICD-10-CM

## 2022-02-18 DIAGNOSIS — G89.4 CHRONIC PAIN SYNDROME: ICD-10-CM

## 2022-02-18 DIAGNOSIS — M48.061 SPINAL STENOSIS OF LUMBAR REGION, UNSPECIFIED WHETHER NEUROGENIC CLAUDICATION PRESENT: ICD-10-CM

## 2022-02-18 DIAGNOSIS — M50.122 CERVICAL DISC DISORDER AT C5-C6 LEVEL WITH RADICULOPATHY: ICD-10-CM

## 2022-02-18 PROBLEM — M47.816 LUMBAR SPONDYLOSIS: Status: ACTIVE | Noted: 2022-02-18

## 2022-02-18 PROCEDURE — 99214 OFFICE O/P EST MOD 30 MIN: CPT | Performed by: ANESTHESIOLOGY

## 2022-02-18 RX ORDER — GABAPENTIN 300 MG/1
600 CAPSULE ORAL 2 TIMES DAILY
Qty: 120 CAPSULE | Refills: 1 | Status: SHIPPED | OUTPATIENT
Start: 2022-02-18 | End: 2022-04-04 | Stop reason: SDUPTHER

## 2022-02-18 NOTE — PROGRESS NOTES
Assessment  1  Lumbar radiculopathy    2  Spinal stenosis of lumbar region, unspecified whether neurogenic claudication present    3  DDD (degenerative disc disease), lumbar        Plan  49-year-old female with a history of alcoholism and substance abuse, last seen in our practice in October 2020 for cervical complaints, presenting for interval consultation regarding lumbosacral back pain that radiates into the right hip and anterior aspect of the right lower extremity to the foot with associated dysesthesias  She has been dealing with low back pain for years, however recently had radiation into the right lower extremity about 4 months ago  She denies any trauma or inciting event  Physical therapy has not been very helpful  She does continue to take gabapentin 300 mg b i d  and ibuprofen p r n  Jose Armando Valenzuela She has not really found much relief with this  MRI of the lumbar spine demonstrates degenerative disc disease and facet arthropathy with varying degrees of central and foraminal stenosis from L2-3 to L5-S1  There is a disc herniation at L4-5 with possible contact on the right L4 nerve root  Patient's symptoms are consistent with right L4 radiculopathy  1  I will schedule the patient for right L4 TFESI to reduce the inflammatory component of her pain  2  I will have the patient titrate up on gabapentin to 600 mg b i d  for neuropathic complaints  3  Patient will continue with her home exercise program  4  I will follow up the patient in 6 weeks         Complete risks and benefits including bleeding, infection, tissue reaction, nerve injury and allergic reaction were discussed  The approach was demonstrated using models and literature was provided  Verbal and written consent was obtained  My impressions and treatment recommendations were discussed in detail with the patient who verbalized understanding and had no further questions  Discharge instructions were provided   I personally saw and examined the patient and I agree with the above discussed plan of care  No orders of the defined types were placed in this encounter  No orders of the defined types were placed in this encounter  History of Present Illness    Abilio Narayanan is a 59 y o  female with a history of alcoholism and substance abuse, last seen in our practice in October 2020 for cervical complaints, presenting for interval consultation regarding lumbosacral back pain that radiates into the right hip and anterior aspect of the right lower extremity to the foot with associated dysesthesias  She has been dealing with low back pain for years, however recently had radiation into the right lower extremity about 4 months ago  She denies any trauma or inciting event  She denies any left lower extremity symptoms, bladder bowel incontinence, or saddle anesthesia  She denies any significant weakness of the right leg  Physical therapy has not been very helpful  She does continue to take gabapentin 300 mg b i d  and ibuprofen p r n  Oren Po She has not really found much relief with this  MRI of the lumbar spine demonstrates degenerative disc disease and facet arthropathy with varying degrees of central and foraminal stenosis from L2-3 to L5-S1  There is a disc herniation at L4-5 with possible contact on the right L4 nerve root  The patient rates her pain moderate and worse in the morning  The pain is constant and described as dull, aching, sharp, throbbing, and shooting  The pain is worse with standing, walking, and exercise  The pain is alleviated with sitting, relaxation, and lying down  Other than as stated above, the patient denies any interval changes in medications, medical condition, mental condition, symptoms, or allergies since the last office visit          I have personally reviewed and/or updated the patient's past medical history, past surgical history, family history, social history, current medications, allergies, and vital signs today      Review of Systems   Constitutional: Negative for fever and unexpected weight change  HENT: Negative for trouble swallowing  Eyes: Negative for visual disturbance  Respiratory: Negative for shortness of breath and wheezing  Cardiovascular: Negative for chest pain and palpitations  Gastrointestinal: Negative for constipation, diarrhea, nausea and vomiting  Endocrine: Negative for cold intolerance, heat intolerance and polydipsia  Genitourinary: Negative for difficulty urinating and frequency  Musculoskeletal: Positive for gait problem and joint swelling  Negative for arthralgias and myalgias  Decreased ROM, pain extremity   Skin: Negative for rash  Neurological: Negative for dizziness, seizures, syncope, weakness and headaches  Hematological: Does not bruise/bleed easily  Psychiatric/Behavioral: Negative for dysphoric mood  All other systems reviewed and are negative        Patient Active Problem List   Diagnosis    Adverse reaction to statin medication    Arthritis    Asthma    Colon polyps    GERD (gastroesophageal reflux disease)    Hyperlipemia, mixed    Essential hypertension    IBS (irritable bowel syndrome)    Nodule of apex of right lung    TB lung fibrosis, exam unkn    Class 1 obesity due to excess calories with serious comorbidity and body mass index (BMI) of 34 0 to 34 9 in adult    Spinal stenosis in cervical region    Former consumption of alcohol    Dense breast    Pleurisy    Cervical radiculopathy    DDD (degenerative disc disease), cervical    Acute suppurative otitis media of right ear without spontaneous rupture of tympanic membrane    Cervical disc disorder at C5-C6 level with radiculopathy    Chronic pain syndrome    Thoracic spine pain    Hepatic steatosis    History of colon polyps       Past Medical History:   Diagnosis Date    Abscess of back     Resolved 1/11/2018     Adverse reaction to statin medication     Resolved 1/11/2018     Alcoholism ([facility] [address] )     AGE 27    Anesthesia complication     extreme dizziness upon awakening    Anxiety     Chronic pain disorder      rt side pinched nerve    Colon polyp     Depression     Edema     GERD (gastroesophageal reflux disease)     Hyperlipidemia     Hypertension     Irregular heart beat     palpitations    Irritable bowel syndrome     Liver disease     fatty    Neck pain     Pinched nerve     Last assessed 9/26/2017     PONV (postoperative nausea and vomiting)     Tuberculosis     1986       Past Surgical History:   Procedure Laterality Date    CARDIAC CATHETERIZATION      due to brothers advanced heart disease and abnormal stress test    COLONOSCOPY N/A 12/6/2017    Procedure: COLONOSCOPY;  Surgeon: Yaz Spain MD;  Location: [place] [address] GI LAB; Service: Gastroenterology    COLONOSCOPY  12    ELBOW SURGERY Right     tendon repair, post op nausea and dizziness    ESOPHAGOGASTRODUODENOSCOPY N/A 12/6/2017    Procedure: ESOPHAGOGASTRODUODENOSCOPY (EGD); Surgeon: Yaz Spain MD;  [facility];   Service: Gastroenterology    ESOPHAGOGASTRODUODENOSCOPY  1986    Diagnostic     SKIN BIOPSY      Last assessed 9/28/2017     UPPER GASTROINTESTINAL ENDOSCOPY      WISDOM TOOTH EXTRACTION         Family History   Problem Relation Age of Onset    Alzheimer's disease Mother     Thyroid disease Mother     Edema Mother     Osteoarthritis Mother         Knee    Stroke Father     Hypertension Father     Hyperlipidemia Father     Arthritis Father     Coronary artery disease Father     Hypothyroidism Father     Other Father         status post coronary artery bypass graft     Stroke Sister     Heart failure Sister     Heart disease Sister     Drug abuse Brother     Completed Suicide  Brother     No Known Problems Maternal Grandmother     No Known Problems Maternal Grandfather     No Known Problems Paternal Grandmother     No Known Problems Paternal Grandfather     Breast cancer Paternal Aunt 48    Colon cancer Maternal Uncle 76    No Known Problems Maternal Aunt     No Known Problems Maternal Aunt        Social History     Occupational History    Not on file   Tobacco Use    Smoking status: Former Smoker     Packs/day: 1 00     Years:  00     Pack years:      Types: Cigarettes     Quit date:      Years since quittin 1    Smokeless tobacco: Never Used   Vaping Use    Vaping Use: Never used   Substance and Sexual Activity    Alcohol use: No     Comment: 1990    Drug use: Never    Sexual activity: Not Currently       Current Outpatient Medications on File Prior to Visit   Medication Sig    acetaminophen (TYLENOL) 650 mg CR tablet Take 650 mg by mouth every 8 (eight) hours as needed for mild pain    Calcium Carbonate-Vitamin D (CALCIUM 600+D HIGH POTENCY PO) Take by mouth daily    Cholecalciferol (VITAMIN D3) 5000 units CAPS Take by mouth daily    famotidine (PEPCID) 40 MG tablet Take 1 tablet by mouth daily as needed    Flaxseed, Linseed, (FLAX SEED OIL) 1000 MG CAPS Take 2 capsules by mouth daily     fluticasone (FLONASE) 50 mcg/act nasal spray 2 sprays into each nostril daily (Patient taking differently: 2 sprays into each nostril daily as needed  )    gabapentin (NEURONTIN) 300 mg capsule Take 1 capsule (300 mg total) by mouth 3 (three) times a day    ibuprofen (MOTRIN) 200 mg tablet Take by mouth every 6 (six) hours as needed for mild pain     losartan-hydrochlorothiazide (HYZAAR) 50-12 5 mg per tablet Take 1 tablet by mouth daily    losartan-hydrochlorothiazide (HYZAAR) 50-12 5 mg per tablet Take 1 tablet by mouth daily    meloxicam (MOBIC) 7 5 mg tablet Take 1 tablet (7 5 mg total) by mouth daily (Patient not taking: Reported on 2022 )    methylPREDNISolone 4 MG tablet therapy pack Use as directed on package    Na Sulfate-K Sulfate-Mg Sulf (Suprep Bowel Prep Kit) 17 5-3 13-1 6 GM/177ML SOLN Take 2 Bottles by mouth see administration instructions Please follow the instructions from the office    naloxone (NARCAN) 4 mg/0 1 mL nasal spray Administer 1 spray into a nostril  If breathing does not return to normal or if breathing difficulty resumes after 2-3 minutes, give another dose in the other nostril using a new spray  No current facility-administered medications on file prior to visit  Allergies   Allergen Reactions    Banana - Food Allergy GI Intolerance    Statins Arthralgia       Physical Exam    Ht 5' 1" (1 549 m)   Wt 83 5 kg (184 lb)   LMP  (LMP Unknown)   BMI 34 77 kg/m²     Constitutional: normal, well developed, well nourished, alert, in no distress and non-toxic and no overt pain behavior  Eyes: anicteric  HEENT: grossly intact  Neck: supple, symmetric, trachea midline and no masses   Pulmonary:even and unlabored  Cardiovascular:No edema or pitting edema present  Skin:Normal without rashes or lesions and well hydrated  Psychiatric:Mood and affect appropriate  Neurologic:Cranial Nerves II-XII grossly intact  Musculoskeletal:normal gait  Right lumbar paraspinals mildly tender to palpation from L3-L5  Bilateral SI joints nontender to palpation  Bilateral patellar and Achilles reflexes were 2/4 and symmetrical   No clonus was noted bilaterally  Bilateral lower extremity strength 5/5 in all muscle groups  Sensation intact to light touch in L3 through S1 dermatomes bilaterally  Positive straight leg raise on the right and negative on the left  Negative Augustin's test bilaterally  Imaging    PACS Images     Show images for XR spine lumbar 2 or 3 views injury    Study Result    Narrative & Impression   LUMBAR SPINE     INDICATION:   M54 16: Radiculopathy, lumbar region      COMPARISON:  None     VIEWS:  XR SPINE LUMBAR 2 OR 3 VIEWS INJURY        FINDINGS:     There are 5 non rib bearing lumbar vertebral bodies    Mild scoliotic curvature concave to the left centered at L2-3      Hypertrophic marginal osteophytes present throughout  Mild disc space narrowing at L4-5 on the right      Soft tissues are unremarkable      IMPRESSION:     Mild scoliosis and degenerative changes         Workstation performed: [id number]       PACS Images     Show images for MRI lumbar spine wo contrast    Study Result    Narrative & Impression   MRI LUMBAR SPINE WITHOUT CONTRAST     INDICATION: M54 16: Radiculopathy, lumbar region  Acute on chronic right-sided low back pain with right lumbar radiculopathy, evaluate for right L4 and L5 lumbar root compression      COMPARISON:  X-rays of the lumbar spine dated 1/14/2022     TECHNIQUE:  Sagittal T1, sagittal T2, sagittal inversion recovery, axial T1 and axial T2, coronal T2  Imaging performed on 3 0T MRI  IMAGE QUALITY:  Diagnostic     FINDINGS:     VERTEBRAL BODIES:  There are 5 lumbar type vertebral bodies  The vertebral body heights preserved  There is mild apex right scoliosis to the lumbar spine        There is mild to moderate multilevel osteophytic spurring involving the lumbar spine, most prominent at L2-L3 and L4-5  Fatty and edematous endplate changes are noted at L5  No discrete marrow lesions are identified      SACRUM:  Normal signal within the sacrum  No evidence of insufficiency or stress fracture      DISTAL CORD AND CONUS:  Normal size and signal within the distal cord and conus      PARASPINAL SOFT TISSUES:  Paraspinal soft tissues are unremarkable      LOWER THORACIC DISC SPACES:  Mild noncompressive lower thoracic degenerative change      LUMBAR DISC SPACES:  There is moderate degenerative disc disease at L4-L5  There is mild multilevel degenerative disc disease involving the remainder of the lumbar spine      L1-L2:  Normal      L2-L3:  There is a small right paracentral disc protrusion which extends into the right neural foramen resulting in mild right neural foramen narrowing    There is no significant central canal or left neural foramen narrowing      L3-L4:  There is diffuse disc bulge and mild facet hypertrophy resulting in mild right neural foramen, minimal to mild central canal, and mild left neural foramen narrowing      L4-L5:  There is diffuse disc bulge eccentric towards the right with mild bilateral facet hypertrophy  Findings result in mild-to-moderate right neural foramen, mild central canal, and minimal left neural foramen narrowing  On image 12 of series 4   there is possible contact of the exiting right L4 nerve root      L5-S1:  There is diffuse disc bulge with small superimposed central disc protrusion with annular fissure  There is mild to moderate bilateral facet hypertrophy  Findings result in minimal right neural foramen, mild central canal, and minimal left   neural foramen narrowing      IMPRESSION:     1  Mild apex right scoliosis with mild to moderate degenerative changes involving the lumbar spine, most prominent at L4-L5 where there is mild to moderate right neural foramen narrowing with possible contact of the exiting right L4 nerve root      2  Additional note is made of minimal to mild central canal and neural foramen narrowing involving the lumbar spine, see above for level by level analysis        Workstation performed: [id number]       PACS Images     Show images for XR spine thoracic 3 vw    Study Result    Narrative & Impression   THORACIC SPINE     INDICATION:   M54 6: Pain in thoracic spine      COMPARISON:  Chest x-ray, 10/3/2019      VIEWS:  XR SPINE THORACIC 3 VW  Images: 3     FINDINGS:     There is no fracture or pathologic bone lesion      Mild stable S-shaped thoracolumbar scoliosis    Alignment otherwise normal      Mild multilevel degenerative disc disease       There is no displacement of the paraspinal line       The pedicles appear intact      IMPRESSION:     No acute osseous abnormality         Workstation performed: [id number]

## 2022-02-21 ENCOUNTER — TELEPHONE (OUTPATIENT)
Dept: RADIOLOGY | Facility: CLINIC | Age: 57
End: 2022-02-21

## 2022-02-22 ENCOUNTER — OFFICE VISIT (OUTPATIENT)
Dept: PHYSICAL THERAPY | Facility: OTHER | Age: 57
End: 2022-02-22
Payer: COMMERCIAL

## 2022-02-22 DIAGNOSIS — G89.29 CHRONIC RIGHT-SIDED LOW BACK PAIN WITH RIGHT-SIDED SCIATICA: Primary | ICD-10-CM

## 2022-02-22 DIAGNOSIS — M54.41 CHRONIC RIGHT-SIDED LOW BACK PAIN WITH RIGHT-SIDED SCIATICA: Primary | ICD-10-CM

## 2022-02-22 PROCEDURE — 97112 NEUROMUSCULAR REEDUCATION: CPT

## 2022-02-22 PROCEDURE — 97110 THERAPEUTIC EXERCISES: CPT

## 2022-02-22 NOTE — PROGRESS NOTES
Daily Note     Today's date: 2022  Patient name: Payton Choe  : 1965  MRN: [id number]  Referring provider: Alfonso Parra  Dx:   Encounter Diagnosis     ICD-10-CM    1  Chronic right-sided low back pain with right-sided sciatica  M54 41     G89 29                 1 on 1 from PTA     Subjective: Patient states she started a higher does of gabapentin which has caused more fatigue  She also plans to get a lumbar injection  She states her ability to get up and down off the floor is still poor, and she continues to have high AM pain  Objective: See treatment diary below      Assessment: Tolerated treatment well  Provided TB for home use  Patient demonstrated fatigue post treatment, exhibited good technique with therapeutic exercises and would benefit from continued PT  Patient continues to respond well to PT intervention  Plan: Continue per plan of care        Precautions: chronic low back pain      Manuals 1/26 2/10 2/15 2/22   L upslip correct       R PI correct       Hip IR/ LS Mob       Slump Sliders       Central Gliders LAD                     Neuro Re-Ed       Bridge with add iso 2 x 10, 5"      Bridge with abd iso 2 x 10, 5"      PBALL dead bug 3 x 10  3 x 10 3 x 10  3 x 10   TA contraction  3 x 10 PBALL 3 x 10 PBALL 3 x 10 pball   bridge 3 x 10 PBALL 3 x 10 PBALL 3 x 10 PBALL 3 x 10 Pball    Multifidus column press 5 x 20" 10 x 20" 10 x 20"    Glut wall press 5 x 20" 10 x 20" 10 x 20" 10 x 20"   palov press 3 x 10 plum 3 x 10 plum 3 x 10 plum 3 x 10 Plum    Mini squat       Bird dog  3 x 10, 18" 3 x 10, 18" 3 x 10, 18"   Nerve glide 3 x 10       Seated GS       Seated GS STS       Seated GS STS w/ MF P 10 laps plum 3 x 10 plum 3 x 10 plum 3 x 10 plum    Sidestep with core activation 10 laps plum 10 laps plum 10 plum 10 [place]    PBALL TA activation ball rollout on table  10 x 18" box 10 x 18" box 15 x 18"          Ther Ex       TM       Standing ext       Prone prop Prone press up                                   Ther Activity                     Gait Training                     Modalities

## 2022-02-23 NOTE — TELEPHONE ENCOUNTER
Patient was contacted and scheduled for   All pre procedure instructions were given to patient   Nothing to eat or drink for 1 hour prior  Loose fitting clothing   Denies Anti Coag's   NSAIDS okay, ASA okay  Denies Antibx   Needs    Patient instructed to continue all routine medications   Patient instructed to contact our office if becomes sick   Refrain from any vaccines 2 weeks before & 2 weeks after  Insurance auth received but is not a guarantee of payment per your insurance company's authorization disclaimer and it is your responsibility to verify your benefits   COVID -19 screening complete

## 2022-03-01 ENCOUNTER — OFFICE VISIT (OUTPATIENT)
Dept: PHYSICAL THERAPY | Facility: OTHER | Age: 57
End: 2022-03-01
Payer: COMMERCIAL

## 2022-03-01 DIAGNOSIS — G89.29 CHRONIC RIGHT-SIDED LOW BACK PAIN WITH RIGHT-SIDED SCIATICA: Primary | ICD-10-CM

## 2022-03-01 DIAGNOSIS — M54.41 CHRONIC RIGHT-SIDED LOW BACK PAIN WITH RIGHT-SIDED SCIATICA: Primary | ICD-10-CM

## 2022-03-01 PROCEDURE — 97110 THERAPEUTIC EXERCISES: CPT | Performed by: PHYSICAL THERAPIST

## 2022-03-01 PROCEDURE — 97112 NEUROMUSCULAR REEDUCATION: CPT | Performed by: PHYSICAL THERAPIST

## 2022-03-01 NOTE — PROGRESS NOTES
Daily Note     Today's date: 3/1/2022  Patient name: James Crockett  : 1965  MRN: [id number]  Referring provider: Blayne Agosto  Dx:   Encounter Diagnosis     ICD-10-CM    1  Chronic right-sided low back pain with right-sided sciatica  M54 41     G89 29                 1 on 1 from PT for entirety    Subjective: Patient continues to report decreased motivation to complete HEP  States that she feels very drowsy with the gabopentin  Objective: See treatment diary below      Assessment: Tolerated treatment well  Patient demonstrated fatigue post treatment, exhibited good technique with therapeutic exercises and would benefit from continued PT  Patient continues to respond well to PT  Notes she feels the best immediately post PT  Continued patient education on the importance of completing home program to help further reduce symptoms b/w sessions  Plan: Continue per plan of care        Precautions: chronic low back pain      Manuals 1/26 2/10 2/15 2/22 3   L upslip correct        R PI correct        Hip IR/ LS Mob        Slump Sliders        Central Gliders LAD                        Neuro Re-Ed        Bridge with add iso 2 x 10, 5"       Bridge with abd iso 2 x 10, 5"       PBALL dead bug 3 x 10  3 x 10 3 x 10  3 x 10 3 x 10   TA contraction  3 x 10 PBALL 3 x 10 PBALL 3 x 10 pball 30" hold 4 x PBALL   bridge 3 x 10 PBALL 3 x 10 PBALL 3 x 10 PBALL 3 x 10 Pball  3 x 10, PBALL   Multifidus column press 5 x 20" 10 x 20" 10 x 20"  20" x 10   Glut wall press 5 x 20" 10 x 20" 10 x 20" 10 x 20" 30" x 4   palov press 3 x 10 plum 3 x 10 plum 3 x 10 plum 3 x 10 Plum  3 x 10 plum   Mini squat        Bird dog  3 x 10, 18" 3 x 10, 18" 3 x 10, 18" 3 x 10, 18"   Nerve glide 3 x 10     3 x 10    Seated GS        Seated GS STS        Seated GS STS w/ MF P 10 laps plum 3 x 10 plum 3 x 10 plum 3 x 10 plum  3 x 10, plum   Sidestep with core activation 10 laps plum 10 laps plum 10 plum 10 Plum  10 plum PBALL TA activation ball rollout on table  10 x 18" box 10 x 18" box 15 x 18" 20, 18"   Mini dead lift     10 x 10#KB   Mini dead lift with rotation (R&L)     10 x 10#   Ther Ex        TM        Standing ext        Prone prop         Prone press up                                        Ther Activity                        Gait Training                        Modalities

## 2022-03-15 ENCOUNTER — HOSPITAL ENCOUNTER (OUTPATIENT)
Dept: RADIOLOGY | Facility: CLINIC | Age: 57
Discharge: HOME | End: 2022-03-15
Attending: ANESTHESIOLOGY | Admitting: ANESTHESIOLOGY
Payer: COMMERCIAL

## 2022-03-15 ENCOUNTER — TELEPHONE (OUTPATIENT)
Dept: PAIN MEDICINE | Facility: CLINIC | Age: 57
End: 2022-03-15

## 2022-03-15 VITALS
OXYGEN SATURATION: 94 % | TEMPERATURE: 98.4 F | HEART RATE: 89 BPM | RESPIRATION RATE: 20 BRPM | SYSTOLIC BLOOD PRESSURE: 170 MMHG | DIASTOLIC BLOOD PRESSURE: 96 MMHG

## 2022-03-15 DIAGNOSIS — M54.16 LUMBAR RADICULOPATHY: ICD-10-CM

## 2022-03-15 PROCEDURE — 64483 NJX AA&/STRD TFRM EPI L/S 1: CPT | Performed by: ANESTHESIOLOGY

## 2022-03-15 RX ORDER — PAPAVERINE HCL 150 MG
10 CAPSULE, EXTENDED RELEASE ORAL ONCE
Status: COMPLETED | OUTPATIENT
Start: 2022-03-15 | End: 2022-03-15

## 2022-03-15 RX ADMIN — IOHEXOL 1 ML: 300 INJECTION, SOLUTION INTRAVENOUS at 09:29

## 2022-03-15 RX ADMIN — DEXAMETHASONE SODIUM PHOSPHATE 10 MG: 10 INJECTION, SOLUTION INTRAMUSCULAR; INTRAVENOUS at 09:29

## 2022-03-15 RX ADMIN — LIDOCAINE HYDROCHLORIDE 1 ML: 20 INJECTION, SOLUTION EPIDURAL; INFILTRATION; INTRACAUDAL; PERINEURAL at 09:30

## 2022-03-15 NOTE — TELEPHONE ENCOUNTER
Patient  [phone]  Dr Josie Connell    Please reach out to pt she has a question about her heart rate   She said that its over 100 beats, is that normal?  Pt said that it doesn't feel normal

## 2022-03-15 NOTE — TELEPHONE ENCOUNTER
HAM    S/w pt  Pt states that she has checked her HR and BP since she got home after her procedure and they have been climbing  Last HR manually checked was 108 but HR up to 133 by machine  BP max 168/101  Pt speaking clearly, denies sob or pain but admits to pressure over her right chest  Pt did take her BP med this am  Pt admits this may be anxiety  Pt advised that steroids may cause restlessness but she should go to ER immediately for evaluation  Pt aware will notify JW of events  Pt will CB with update

## 2022-03-15 NOTE — H&P
History of Present Illness: The patient is a 59 y o  female who presents with complaints of low back and leg pain      Patient Active Problem List   Diagnosis    Adverse reaction to statin medication    Arthritis    Asthma    Colon polyps    GERD (gastroesophageal reflux disease)    Hyperlipemia, mixed    Essential hypertension    IBS (irritable bowel syndrome)    Nodule of apex of right lung    TB lung fibrosis, exam unkn    Class 1 obesity due to excess calories with serious comorbidity and body mass index (BMI) of 34 0 to 34 9 in adult    Spinal stenosis in cervical region    Former consumption of alcohol    Dense breast    Pleurisy    Cervical radiculopathy    DDD (degenerative disc disease), cervical    Acute suppurative otitis media of right ear without spontaneous rupture of tympanic membrane    Cervical disc disorder at C5-C6 level with radiculopathy    Chronic pain syndrome    Thoracic spine pain    Hepatic steatosis    History of colon polyps    Lumbar spondylosis    Spinal stenosis of lumbar region    Lumbar radiculopathy       Past Medical History:   Diagnosis Date    Abscess of back     Resolved 1/11/2018     Adverse reaction to statin medication     Resolved 1/11/2018     Alcoholism ([address] )     AGE 27    Anesthesia complication     extreme dizziness upon awakening    Anxiety     Chronic pain disorder      rt side pinched nerve    Colon polyp     Depression     Edema     GERD (gastroesophageal reflux disease)     Hyperlipidemia     Hypertension     Irregular heart beat     palpitations    Irritable bowel syndrome     Liver disease     fatty    Neck pain     Pinched nerve     Last assessed 9/26/2017     PONV (postoperative nausea and vomiting)     Tuberculosis     1986       Past Surgical History:   Procedure Laterality Date    CARDIAC CATHETERIZATION      due to brothers advanced heart disease and abnormal stress test    COLONOSCOPY N/A 12/6/2017    Procedure: COLONOSCOPY;  Surgeon: Yaz Spain MD;  Location: [facility]; Service: Gastroenterology    COLONOSCOPY  12    ELBOW SURGERY Right     tendon repair, post op nausea and dizziness    ESOPHAGOGASTRODUODENOSCOPY N/A 12/6/2017    Procedure: ESOPHAGOGASTRODUODENOSCOPY (EGD); Surgeon: Yaz Spain MD;  Location: [facility];   Service: Gastroenterology    ESOPHAGOGASTRODUODENOSCOPY  1986    Diagnostic     SKIN BIOPSY      Last assessed 9/28/2017     UPPER GASTROINTESTINAL ENDOSCOPY      WISDOM TOOTH EXTRACTION           Current Outpatient Medications:     acetaminophen (TYLENOL) 650 mg CR tablet, Take 650 mg by mouth every 8 (eight) hours as needed for mild pain, Disp: , Rfl:     Calcium Carbonate-Vitamin D (CALCIUM 600+D HIGH POTENCY PO), Take by mouth daily, Disp: , Rfl:     Cholecalciferol (VITAMIN D3) 5000 units CAPS, Take by mouth daily, Disp: , Rfl:     famotidine (PEPCID) 40 MG tablet, Take 1 tablet by mouth daily as needed, Disp: , Rfl:     Flaxseed, Linseed, (FLAX SEED OIL) 1000 MG CAPS, Take 2 capsules by mouth daily , Disp: , Rfl:     fluticasone (FLONASE) 50 mcg/act nasal spray, 2 sprays into each nostril daily (Patient taking differently: 2 sprays into each nostril daily as needed  ), Disp: 16 g, Rfl: 0    gabapentin (NEURONTIN) 300 mg capsule, Take 2 capsules (600 mg total) by mouth 2 (two) times a day, Disp: 120 capsule, Rfl: 1    ibuprofen (MOTRIN) 200 mg tablet, Take by mouth every 6 (six) hours as needed for mild pain , Disp: , Rfl:     losartan-hydrochlorothiazide (HYZAAR) 50-12 5 mg per tablet, Take 1 tablet by mouth daily, Disp: 90 tablet, Rfl: 0    losartan-hydrochlorothiazide (HYZAAR) 50-12 5 mg per tablet, Take 1 tablet by mouth daily, Disp: 90 tablet, Rfl: 0    meloxicam (MOBIC) 7 5 mg tablet, Take 1 tablet (7 5 mg total) by mouth daily (Patient not taking: Reported on 1/14/2022 ), Disp: 14 tablet, Rfl: 0    methylPREDNISolone 4 MG tablet therapy pack, Use as directed on package, Disp: 21 tablet, Rfl: 0    Na Sulfate-K Sulfate-Mg Sulf (Suprep Bowel Prep Kit) 17 5-3 13-1 6 GM/177ML SOLN, Take 2 Bottles by mouth see administration instructions Please follow the instructions from the office, Disp: 177 mL, Rfl: 0    naloxone (NARCAN) 4 mg/0 1 mL nasal spray, Administer 1 spray into a nostril  If breathing does not return to normal or if breathing difficulty resumes after 2-3 minutes, give another dose in the other nostril using a new spray , Disp: 1 each, Rfl: 1    Current Facility-Administered Medications:     dexamethasone (PF) (DECADRON) injection 10 mg, 10 mg, Epidural, Once, Bella Lockett, DO    iohexol (OMNIPAQUE) 300 mg/mL injection 50 mL, 50 mL, Epidural, Once, Bereket Lockett,     lidocaine (PF) (XYLOCAINE-MPF) 2 % injection 2 mL, 2 mL, Epidural, Once, Bereket Lockett, DO    Allergies   Allergen Reactions    Banana - Food Allergy GI Intolerance    Statins Arthralgia       Physical Exam:   Vitals:    03/15/22 0903   BP: 142/85   Pulse: 79   Resp: 20   Temp: 98 4 °F (36 9 °C)   SpO2: 100%     General: Awake, Alert, Oriented x 3, Mood and affect appropriate  Respiratory: Respirations even and unlabored  Cardiovascular: Peripheral pulses intact; no edema  Musculoskeletal Exam:  Right lumbar paraspinals tender to palpation  ASA Score: 2    Patient/Chart Verification  Patient ID Verified: Verbal  ID Band Applied: No  Consents Confirmed: Procedural,To be obtained in the Pre-Procedure area  H&P( within 30 days) Verified: To be obtained in the Pre-Procedure area  Allergies Reviewed: Yes  Anticoag/NSAID held?: NA  Currently on antibiotics?: No  Pregnancy denied?: Yes    Assessment:   1   Lumbar radiculopathy        Plan: Right L4 TFESI

## 2022-03-15 NOTE — DISCHARGE INSTRUCTIONS
Epidural Steroid Injection   WHAT YOU NEED TO KNOW:   An epidural steroid injection (RAHEEM) is a procedure to inject steroid medicine into the epidural space  The epidural space is between your spinal cord and vertebrae  Steroids reduce inflammation and fluid buildup in your spine that may be causing pain  You may be given pain medicine along with the steroids  ACTIVITY  · Do not drive or operate machinery today  · No strenuous activity today - bending, lifting, etc   · You may resume normal activites starting tomorrow - start slowly and as tolerated  · You may shower today, but no tub baths or hot tubs  · You may have numbness for several hours from the local anesthetic  Please use caution and common sense, especially with weight-bearing activities  CARE OF THE INJECTION SITE  · If you have soreness or pain, apply ice to the area today (20 minutes on/20 minutes off)  · Starting tomorrow, you may use warm, moist heat or ice if needed  · You may have an increase or change in your discomfort for 36-48 hours after your treatment  · Apply ice and continue with any pain medication you have been prescribed  · Notify [facility] if you have any of the following: redness, drainage, swelling, headache, stiff neck or fever above 100°F     SPECIAL INSTRUCTIONS  · Our office will contact you in approximately 7 days for a progress report  MEDICATIONS  · Continue to take all routine medications  · Our office may have instructed you to hold some medications  As no general anesthesia was used in today's procedure, you should not experience any side effects related to anesthesia  If you have a problem specifically related to your procedure, please call our office at [phone]  Problems not related to your procedure should be directed to your primary care physician

## 2022-03-15 NOTE — TELEPHONE ENCOUNTER
I do not think that this is a direct result of the injection considering there is no epinephrine in the local anesthetic used  I agree with having the patient evaluated in the emergency room to rule out any cardiac pathology    Please follow-up with the patient tomorrow

## 2022-03-16 NOTE — TELEPHONE ENCOUNTER
S/w the patient and she stated she went to [facility] ([place], [place]) yesterday and her HR and BP continued to be elevated  They gave her ASA and pepcid for heartburn  She was admitted for 24 hour observation  Internal med just saw her and they seem to think it was a reaction to the steroid in the injection  I did review your previous comment about it  She stated she has had decadron in the past and it has increased her HR previously but she seems to think that her anxiety probably played a part  She is scheduled to have an echo before she leaves and she appreciated the call

## 2022-03-18 ENCOUNTER — APPOINTMENT (OUTPATIENT)
Dept: PHYSICAL THERAPY | Facility: OTHER | Age: 57
End: 2022-03-18
Payer: COMMERCIAL

## 2022-03-22 ENCOUNTER — OFFICE VISIT (OUTPATIENT)
Dept: PHYSICAL THERAPY | Facility: OTHER | Age: 57
End: 2022-03-22
Payer: COMMERCIAL

## 2022-03-22 ENCOUNTER — TELEPHONE (OUTPATIENT)
Dept: PAIN MEDICINE | Facility: CLINIC | Age: 57
End: 2022-03-22

## 2022-03-22 DIAGNOSIS — G89.29 CHRONIC RIGHT-SIDED LOW BACK PAIN WITH RIGHT-SIDED SCIATICA: Primary | ICD-10-CM

## 2022-03-22 DIAGNOSIS — M54.41 CHRONIC RIGHT-SIDED LOW BACK PAIN WITH RIGHT-SIDED SCIATICA: Primary | ICD-10-CM

## 2022-03-22 PROCEDURE — 97112 NEUROMUSCULAR REEDUCATION: CPT | Performed by: PHYSICAL THERAPIST

## 2022-03-22 PROCEDURE — 97110 THERAPEUTIC EXERCISES: CPT | Performed by: PHYSICAL THERAPIST

## 2022-03-22 NOTE — PROGRESS NOTES
Daily Note     Today's date: 3/22/2022  Patient name: Ngoc Kim  : 1965  MRN: [id number]  Referring provider: Celia Ellis  Dx:   Encounter Diagnosis     ICD-10-CM    1  Chronic right-sided low back pain with right-sided sciatica  M54 41     G89 29                 1 on 1 from PT from [phone], not billed remainder    Subjective: Patient reports she has not been doing the best  States that she had an adverse reaction to her steroid injection  Continues to report decreased motivation to complete HEP  Objective: See treatment diary below      Assessment: Tolerated treatment well  Patient demonstrated fatigue post treatment, exhibited good technique with therapeutic exercises and would benefit from continued PT  Patient reporting she was pain free with bridging for the first time since injection  Patient continues to respond well to PT  Notes she feels the best immediately post PT  Continued patient education on the importance of completing home program to help further reduce symptoms b/w sessions  Plan: Continue per plan of care        Precautions: chronic low back pain      Manuals 1/26 2/10 2/15 2/22 3/1 3/22    L upslip correct          R PI correct          Hip IR/ LS Mob          Slump Sliders          Central Gliders LAD                              Neuro Re-Ed          Bridge with add iso 2 x 10, 5"         Bridge with abd iso 2 x 10, 5"         PBALL dead bug 3 x 10  3 x 10 3 x 10  3 x 10 3 x 10 3 x 10    TA contraction  3 x 10 PBALL 3 x 10 PBALL 3 x 10 pball 30" hold 4 x PBALL 30" hold 4 x PBALL    bridge 3 x 10 PBALL 3 x 10 PBALL 3 x 10 PBALL 3 x 10 Pball  3 x 10, PBALL 3 x 10, PBALL    Multifidus column press 5 x 20" 10 x 20" 10 x 20"  20" x 10 30" x 4    Glut wall press 5 x 20" 10 x 20" 10 x 20" 10 x 20" 30" x 4 30" x 4    palov press 3 x 10 plum 3 x 10 plum 3 x 10 plum 3 x 10 Plum  3 x 10 plum 3 x 10 plum    Mini squat          Bird dog  3 x 10, 18" 3 x 10, 18" 3 x 10, 18" 3 x 10, 18" 3 x 10, 12"    Nerve glide 3 x 10     3 x 10  3 x 10     Seated GS          Seated GS STS          Seated GS STS w/ MF P 10 laps plum 3 x 10 plum 3 x 10 plum 3 x 10 plum  3 x 10, plum 3 x 10, plum    Sidestep with core activation 10 laps plum 10 laps plum 10 plum 10 Plum  10 plum 10 plum    PBALL TA activation ball rollout on table  10 x 18" box 10 x 18" box 15 x 18" 20, 18" 20 12"    Mini dead lift     10 x 10#KB 2 x 10  10#KB    Mini dead lift with rotation (R&L)     10 x 10# 2 x 10, 10#    Ther Ex          TM          Standing ext          Prone prop           Prone press up                                                  Ther Activity                              Gait Training                              Modalities

## 2022-03-24 DIAGNOSIS — I10 ESSENTIAL HYPERTENSION: ICD-10-CM

## 2022-03-25 RX ORDER — LOSARTAN POTASSIUM AND HYDROCHLOROTHIAZIDE 12.5; 5 MG/1; MG/1
1 TABLET ORAL DAILY
Qty: 90 TABLET | Refills: 0 | Status: SHIPPED | OUTPATIENT
Start: 2022-03-25 | End: 2022-04-13

## 2022-03-29 ENCOUNTER — OFFICE VISIT (OUTPATIENT)
Dept: PHYSICAL THERAPY | Facility: OTHER | Age: 57
End: 2022-03-29
Payer: COMMERCIAL

## 2022-03-29 ENCOUNTER — COSMETIC (OUTPATIENT)
Dept: DERMATOLOGY | Facility: CLINIC | Age: 57
End: 2022-03-29
Payer: COMMERCIAL

## 2022-03-29 VITALS — BODY MASS INDEX: 34.74 KG/M2 | TEMPERATURE: 98 F | HEIGHT: 61 IN | WEIGHT: 184 LBS

## 2022-03-29 DIAGNOSIS — M54.41 CHRONIC RIGHT-SIDED LOW BACK PAIN WITH RIGHT-SIDED SCIATICA: Primary | ICD-10-CM

## 2022-03-29 DIAGNOSIS — G89.29 CHRONIC RIGHT-SIDED LOW BACK PAIN WITH RIGHT-SIDED SCIATICA: Primary | ICD-10-CM

## 2022-03-29 DIAGNOSIS — L91.8 ACHROCHORDON: ICD-10-CM

## 2022-03-29 DIAGNOSIS — L82.1 SEBORRHEIC KERATOSIS: Primary | ICD-10-CM

## 2022-03-29 PROCEDURE — 97112 NEUROMUSCULAR REEDUCATION: CPT | Performed by: PHYSICAL THERAPIST

## 2022-03-29 PROCEDURE — 97140 MANUAL THERAPY 1/> REGIONS: CPT | Performed by: PHYSICAL THERAPIST

## 2022-03-29 PROCEDURE — LESIONRMLEACHADD LESION REMOVAL EACH ADD'L: Performed by: DERMATOLOGY

## 2022-03-29 PROCEDURE — 97110 THERAPEUTIC EXERCISES: CPT | Performed by: PHYSICAL THERAPIST

## 2022-03-29 PROCEDURE — LESIONRML10 LESION REMOVAL FIRST 10: Performed by: DERMATOLOGY

## 2022-03-29 NOTE — PATIENT INSTRUCTIONS
PROCEDURE:  DESTRUCTION OF BENIGN LESIONS  After a thorough discussion of treatment options and risk/benefits/alternatives (including but not limited to local pain, scarring, dyspigmentation, blistering, and possible superinfection), verbal and written consent were obtained and the aforementioned lesions were treated on with cryotherapy using liquid nitrogen x 1 cycle for 5-10 seconds  The patient tolerated the procedure well, and after-care instructions were provided  Once the areas that were treated today start to get crusty we recommend applying Vaseline daily until skin tags fall off

## 2022-03-29 NOTE — PROGRESS NOTES
THIS IS A COSMETIC PATIENT WHO PAID OUT OF POCKET AT TIME OF VISIT  DO NOT BILL  $160  THE PATIENT ALREADY PAID IN FULL FOR SERVICES      PROCEDURE:  DESTRUCTION OF BENIGN LESIONS  After a thorough discussion of treatment options and risk/benefits/alternatives (including but not limited to local pain, scarring, dyspigmentation, blistering, and possible superinfection), verbal and written consent were obtained and the aforementioned lesions were treated on with cryotherapy using liquid nitrogen x 1 cycle for 5-10 seconds   TOTAL NUMBER of 11 lesions were treated today on the ANATOMIC LOCATION: neck, back  The patient tolerated the procedure well, and after-care instructions were provided  Once the areas that were treated today start to get crusty we recommend applying Vaseline daily until skin tags fall off               Scribe Attestation    I,:  Jessica Greenberg am acting as a scribe while in the presence of the attending physician :       I,:  Sunday Freeman MD personally performed the services described in this documentation    as scribed in my presence :

## 2022-03-30 NOTE — PROGRESS NOTES
Daily Note     Today's date: 3/30/2022  Patient name: Blaine Morton  : 1965  MRN: [id number]  Referring provider: Gerald Comer  Dx:   Encounter Diagnosis     ICD-10-CM    1  Chronic right-sided low back pain with right-sided sciatica  M54 41     G89 29        Start Time: 1715  Stop Time: 1800  Total time in clinic (min): 45 minutes  1 on 1 from PT from [phone], not billed remainder    Subjective: Patient eports she feels her injection has finally worked  States she still has pain at times but, there are periods of time where she does forget about pain and has improved function  Objective: See treatment diary below      Assessment: Tolerated treatment well  Patient demonstrated fatigue post treatment, exhibited good technique with therapeutic exercises and would benefit from continued PT  Patient requesting to hold exercises in supine position due to having mole removed and having sensitivity  Patient with good tolerance to today's program focused on functional activity  Consider progressing functional lifting as tolerated  Plan: Continue per plan of care        Precautions: chronic low back pain      Manuals 2/15 2/22 3/1 3/22 3/29   L upslip correct        R PI correct        Hip IR/ LS Mob        Slump Sliders        Central Gliders LAD                        Neuro Re-Ed        Bridge with add iso        Bridge with abd iso        PBALL dead bug 3 x 10  3 x 10 3 x 10 3 x 10    TA contraction 3 x 10 PBALL 3 x 10 pball 30" hold 4 x PBALL 30" hold 4 x PBALL    bridge 3 x 10 PBALL 3 x 10 Pball  3 x 10, PBALL 3 x 10, PBALL    Multifidus column press 10 x 20"  20" x 10 30" x 4 30" x 4   Glut wall press 10 x 20" 10 x 20" 30" x 4 30" x 4 30" x 4   palov press 3 x 10 plum 3 x 10 Plum  3 x 10 plum 3 x 10 plum 3 x 10 plum   Mini squat        Bird dog 3 x 10, 18" 3 x 10, 18" 3 x 10, 18" 3 x 10, 12" 3 x10, 12"   Nerve glide   3 x 10  3 x 10  3 x 10   Seated GS        Seated GS STS        Seated GS STS w/ MF P 3 x 10 plum 3 x 10 plum  3 x 10, plum 3 x 10, plum    Sidestep with core activation 10 plum 10 Plum  10 plum 10 plum 10 plum   PBALL TA activation ball rollout on table 10 x 18" box 15 x 18" 20, 18" 20 12" 20 12"   Mini dead lift   10 x 10#KB 2 x 10  10#KB 2 x10, 10# KB   Mini dead lift with rotation (R&L)   10 x 10# 2 x 10, 10# 2 x 10 KB10#   Ther Ex        TM        Standing ext        Prone prop         Prone press up                                        Ther Activity                        Gait Training                        Modalities

## 2022-04-01 ENCOUNTER — HOSPITAL ENCOUNTER (OUTPATIENT)
Dept: RADIOLOGY | Age: 57
Discharge: HOME | End: 2022-04-01
Payer: COMMERCIAL

## 2022-04-01 VITALS — BODY MASS INDEX: 34.74 KG/M2 | WEIGHT: 184 LBS | HEIGHT: 61 IN

## 2022-04-01 DIAGNOSIS — Z12.31 ENCOUNTER FOR SCREENING MAMMOGRAM FOR MALIGNANT NEOPLASM OF BREAST: ICD-10-CM

## 2022-04-01 PROCEDURE — 77067 SCR MAMMO BI INCL CAD: CPT

## 2022-04-01 PROCEDURE — 77063 BREAST TOMOSYNTHESIS BI: CPT

## 2022-04-04 ENCOUNTER — OFFICE VISIT (OUTPATIENT)
Dept: PAIN MEDICINE | Facility: CLINIC | Age: 57
End: 2022-04-04
Payer: COMMERCIAL

## 2022-04-04 VITALS
HEIGHT: 61 IN | BODY MASS INDEX: 35.12 KG/M2 | SYSTOLIC BLOOD PRESSURE: 139 MMHG | HEART RATE: 92 BPM | WEIGHT: 186 LBS | DIASTOLIC BLOOD PRESSURE: 87 MMHG

## 2022-04-04 DIAGNOSIS — M48.02 SPINAL STENOSIS IN CERVICAL REGION: ICD-10-CM

## 2022-04-04 DIAGNOSIS — M47.816 LUMBAR SPONDYLOSIS: ICD-10-CM

## 2022-04-04 DIAGNOSIS — M79.18 MYOFASCIAL PAIN SYNDROME: ICD-10-CM

## 2022-04-04 DIAGNOSIS — G89.4 CHRONIC PAIN SYNDROME: Primary | ICD-10-CM

## 2022-04-04 DIAGNOSIS — M48.061 SPINAL STENOSIS OF LUMBAR REGION, UNSPECIFIED WHETHER NEUROGENIC CLAUDICATION PRESENT: ICD-10-CM

## 2022-04-04 DIAGNOSIS — M54.12 CERVICAL RADICULOPATHY: ICD-10-CM

## 2022-04-04 DIAGNOSIS — M54.16 LUMBAR RADICULOPATHY: ICD-10-CM

## 2022-04-04 DIAGNOSIS — M50.30 DDD (DEGENERATIVE DISC DISEASE), CERVICAL: ICD-10-CM

## 2022-04-04 DIAGNOSIS — M50.122 CERVICAL DISC DISORDER AT C5-C6 LEVEL WITH RADICULOPATHY: ICD-10-CM

## 2022-04-04 PROCEDURE — 99214 OFFICE O/P EST MOD 30 MIN: CPT | Performed by: NURSE PRACTITIONER

## 2022-04-04 RX ORDER — GABAPENTIN 300 MG/1
600 CAPSULE ORAL 2 TIMES DAILY
Qty: 120 CAPSULE | Refills: 1 | Status: SHIPPED | OUTPATIENT
Start: 2022-04-04

## 2022-04-04 RX ORDER — METHOCARBAMOL 500 MG/1
500 TABLET, FILM COATED ORAL
Qty: 30 TABLET | Refills: 0 | Status: SHIPPED | OUTPATIENT
Start: 2022-04-04 | End: 2022-04-13

## 2022-04-04 NOTE — PROGRESS NOTES
Assessment:  1  Chronic pain syndrome    2  Lumbar radiculopathy    3  Cervical radiculopathy    4  Lumbar spondylosis    5  Cervical disc disorder at C5-C6 level with radiculopathy    6  DDD (degenerative disc disease), cervical    7  Spinal stenosis in cervical region    8  Spinal stenosis of lumbar region, unspecified whether neurogenic claudication present    9  Myofascial pain syndrome        Plan:  1  Patient will increase gabapentin to 600 mg twice a day  I advised the patient that if they experience any side effects or issues with the changes in their medication regiment, they should give our office a call to discuss  I also advised the patient not to drive or operate machinery until they see how the changes in the medication regimen affects them  The patient was agreeable and verbalized an understanding  2  I will trial methocarbamol 500 mg q h s  p r n  myofascial pain I advised the patient that they should not drive or operate machinery while on this medication until they see how it affects them, as it could cause lethargy and mental cloudyness  I advised the patient to call our office if they experience any side effects or issues with the medication changes  The patient verbalized an understanding  3  Should the patient's right lower extremity symptoms return, could repeat right L4 TFESI, however would most likely clear through cardiology 1st as she was admitted into the emergency room the day of her procedure for what was determined to be steroid induced hypertension and tachycardia  4  May consider lumbar medial branch block should her axial low back pain worsen in the future   5  Patient is non opioid therapy from our practice secondary to history of alcoholism and substance abuse   6  Patient will continue with her home exercise program   7  I will avoid Tylenol products secondary to increased LFTs  Patient states she was also told to avoid NSAIDs  8   Patient will follow-up in 4 weeks or sooner if needed    M*Modal software was used to dictate this note  It may contain errors with dictating incorrect words or incorrect spelling  Please contact the provider directly with any questions  History of Present Illness: The patient is a 59 y o  female with a history of alcoholism and substance abuse last seen on 2/18/22 who presents for a follow up office visit in regards to chronic right-sided low back pain with radiculopathy into the right lower extremity secondary to lumbar degenerative disc disease, lumbar spondylosis, lumbar stenosis, lumbar radiculopathy and chronic pain syndrome  She is also complaining of pain near the right thoracic paraspinal musculature that she states radiates up from her right low back  The patient denies bowel or bladder incontinence or saddle anesthesia  Patient is status post right L4 TFESI on March 15, 2022  She reports essentially resolved right lower extremity symptoms from the procedure, however she does continue with some low back pain  She has completed physical therapy in the past without much relief  She takes gabapentin 300 mg in the morning and 600 mg at bedtime  She states she was advised to avoid NSAIDs and Tylenol secondary to elevated LFTs  The patient rates her pain a 3 to 4/10 on the numeric pain rating scale  She states her pain is most bothersome the morning and is described as dull aching, sharp, cramping, pressure like and shooting    I have personally reviewed and/or updated the patient's past medical history, past surgical history, family history, social history, current medications, allergies, and vital signs today  Review of Systems:    Review of Systems   Respiratory: Negative for shortness of breath  Cardiovascular: Negative for chest pain  Gastrointestinal: Negative for constipation, diarrhea, nausea and vomiting  Musculoskeletal: Positive for gait problem  Negative for arthralgias, joint swelling and myalgias     Skin: Negative for rash  Neurological: Negative for dizziness, seizures and weakness  All other systems reviewed and are negative  Past Medical History:   Diagnosis Date    Abscess of back     Resolved 1/11/2018     Adverse reaction to statin medication     Resolved 1/11/2018     Alcoholism ([address] )     AGE 27    Anesthesia complication     extreme dizziness upon awakening    Anxiety     Chronic pain disorder      rt side pinched nerve    Colon polyp     Depression     Edema     GERD (gastroesophageal reflux disease)     Hyperlipidemia     Hypertension     Irregular heart beat     palpitations    Irritable bowel syndrome     Liver disease     fatty    Neck pain     Pinched nerve     Last assessed 9/26/2017     PONV (postoperative nausea and vomiting)     Tuberculosis     1986       Past Surgical History:   Procedure Laterality Date    CARDIAC CATHETERIZATION      due to brothers advanced heart disease and abnormal stress test    COLONOSCOPY N/A 12/6/2017    Procedure: COLONOSCOPY;  Surgeon: Emmett Mancera MD;  Location: [place] [address] [facility]; Service: Gastroenterology    COLONOSCOPY  12    ELBOW SURGERY Right     tendon repair, post op nausea and dizziness    ESOPHAGOGASTRODUODENOSCOPY N/A 12/6/2017    Procedure: ESOPHAGOGASTRODUODENOSCOPY (EGD); Surgeon: Emmett Mancera MD;  Location: [facility];   Service: Gastroenterology    ESOPHAGOGASTRODUODENOSCOPY  1986    Diagnostic     SKIN BIOPSY      Last assessed 9/28/2017     UPPER GASTROINTESTINAL ENDOSCOPY      WISDOM TOOTH EXTRACTION         Family History   Problem Relation Age of Onset    Alzheimer's disease Mother     Thyroid disease Mother     Edema Mother     Osteoarthritis Mother         Knee    Stroke Father     Hypertension Father     Hyperlipidemia Father     Arthritis Father     Coronary artery disease Father     Hypothyroidism Father     Other Father         status post coronary artery bypass graft     Stroke Sister  Heart failure Sister     Heart disease Sister     Drug abuse Brother     Completed Suicide  Brother     No Known Problems Maternal Grandmother     No Known Problems Maternal Grandfather     No Known Problems Paternal Grandmother     No Known Problems Paternal Grandfather     Breast cancer Paternal Aunt 48    Colon cancer Maternal Uncle 76    No Known Problems Maternal Aunt     No Known Problems Maternal Aunt        Social History     Occupational History    Not on file   Tobacco Use    Smoking status: Former Smoker     Packs/day: 1 00     Years: 9 00     Pack years: 9 00     Types: Cigarettes     Quit date:      Years since quittin 2    Smokeless tobacco: Never Used   Vaping Use    Vaping Use: Never used   Substance and Sexual Activity    Alcohol use: No     Comment: 1990    Drug use: Never    Sexual activity: Not Currently         Current Outpatient Medications:     gabapentin (NEURONTIN) 300 mg capsule, Take 2 capsules (600 mg total) by mouth 2 (two) times a day, Disp: 120 capsule, Rfl: 1    acetaminophen (TYLENOL) 650 mg CR tablet, Take 650 mg by mouth every 8 (eight) hours as needed for mild pain, Disp: , Rfl:     Calcium Carbonate-Vitamin D (CALCIUM 600+D HIGH POTENCY PO), Take by mouth daily, Disp: , Rfl:     Cholecalciferol (VITAMIN D3) 5000 units CAPS, Take by mouth daily, Disp: , Rfl:     famotidine (PEPCID) 40 MG tablet, Take 1 tablet by mouth daily as needed, Disp: , Rfl:     Flaxseed, Linseed, (FLAX SEED OIL) 1000 MG CAPS, Take 2 capsules by mouth daily , Disp: , Rfl:     fluticasone (FLONASE) 50 mcg/act nasal spray, 2 sprays into each nostril daily (Patient not taking: Reported on 2022 ), Disp: 16 g, Rfl: 0    ibuprofen (MOTRIN) 200 mg tablet, Take by mouth every 6 (six) hours as needed for mild pain , Disp: , Rfl:     losartan-hydrochlorothiazide (HYZAAR) 50-12 5 mg per tablet, Take 1 tablet by mouth daily, Disp: 90 tablet, Rfl: 0   losartan-hydrochlorothiazide (HYZAAR) 50-12 5 mg per tablet, Take 1 tablet by mouth daily, Disp: 90 tablet, Rfl: 0    meloxicam (MOBIC) 7 5 mg tablet, Take 1 tablet (7 5 mg total) by mouth daily, Disp: 14 tablet, Rfl: 0    methocarbamol (ROBAXIN) 500 mg tablet, Take 1 tablet (500 mg total) by mouth daily at bedtime as needed for muscle spasms, Disp: 30 tablet, Rfl: 0    methylPREDNISolone 4 MG tablet therapy pack, Use as directed on package, Disp: 21 tablet, Rfl: 0    Na Sulfate-K Sulfate-Mg Sulf (Suprep Bowel Prep Kit) 17 5-3 13-1 6 GM/177ML SOLN, Take 2 Bottles by mouth see administration instructions Please follow the instructions from the office, Disp: 177 mL, Rfl: 0    naloxone (NARCAN) 4 mg/0 1 mL nasal spray, Administer 1 spray into a nostril  If breathing does not return to normal or if breathing difficulty resumes after 2-3 minutes, give another dose in the other nostril using a new spray , Disp: 1 each, Rfl: 1    Allergies   Allergen Reactions    Banana - Food Allergy GI Intolerance    Statins Arthralgia       Physical Exam:    /87   Pulse 92   Ht 5' 1" (1 549 m)   Wt 84 4 kg (186 lb)   LMP  (LMP Unknown)   BMI 35 14 kg/m²     Constitutional:normal, well developed, well nourished, alert, in no distress and non-toxic and no overt pain behavior  Eyes:anicteric  HEENT:grossly intact  Neck:supple, symmetric, trachea midline and no masses   Pulmonary:even and unlabored  Cardiovascular:No edema or pitting edema present  Skin:Normal without rashes or lesions and well hydrated  Psychiatric:Mood and affect appropriate  Neurologic:Cranial Nerves II-XII grossly intact  Musculoskeletal:normal gait      Imaging  No orders to display     MRI LUMBAR SPINE WITHOUT CONTRAST   INDICATION: [id number]: Radiculopathy, lumbar region  Acute on chronic right-sided low back pain with right lumbar radiculopathy, evaluate for right L4 and L5 lumbar root compression     COMPARISON: X-rays of the lumbar spine dated 1/14/2022   TECHNIQUE: Sagittal T1, sagittal T2, sagittal inversion recovery, axial T1 and axial T2, coronal T2  Imaging performed on 3 0T MRI IMAGE QUALITY: Diagnostic   FINDINGS:   VERTEBRAL BODIES: There are 5 lumbar type vertebral bodies  The vertebral body heights preserved  There is mild apex right scoliosis to the lumbar spine  There is mild to moderate multilevel osteophytic spurring involving the lumbar spine, most prominent at L2-L3 and L4-5  Fatty and edematous endplate changes are noted at L5  No discrete marrow lesions are identified  SACRUM: Normal signal within the sacrum  No evidence of insufficiency or stress fracture  DISTAL CORD AND CONUS: Normal size and signal within the distal cord and conus  PARASPINAL SOFT TISSUES: Paraspinal soft tissues are unremarkable  LOWER THORACIC DISC SPACES: Mild noncompressive lower thoracic degenerative change  LUMBAR DISC SPACES: There is moderate degenerative disc disease at L4-L5  There is mild multilevel degenerative disc disease involving the remainder of the lumbar spine  L1-L2: Normal    L2-L3: There is a small right paracentral disc protrusion which extends into the right neural foramen resulting in mild right neural foramen narrowing  There is no significant central canal or left neural foramen narrowing  L3-L4: There is diffuse disc bulge and mild facet hypertrophy resulting in mild right neural foramen, minimal to mild central canal, and mild left neural foramen narrowing  L4-L5: There is diffuse disc bulge eccentric towards the right with mild bilateral facet hypertrophy  Findings result in mild-to-moderate right neural foramen, mild central canal, and minimal left neural foramen narrowing  On image 12 of series 4   there is possible contact of the exiting right L4 nerve root  L5-S1: There is diffuse disc bulge with small superimposed central disc protrusion with annular fissure   There is mild to moderate bilateral facet hypertrophy  Findings result in minimal right neural foramen, mild central canal, and minimal left   neural foramen narrowing  IMPRESSION:   1  Mild apex right scoliosis with mild to moderate degenerative changes involving the lumbar spine, most prominent at L4-L5 where there is mild to moderate right neural foramen narrowing with possible contact of the exiting right L4 nerve root  2  Additional note is made of minimal to mild central canal and neural foramen narrowing involving the lumbar spine, see above for level by level analysis      No orders of the defined types were placed in this encounter

## 2022-04-04 NOTE — PATIENT INSTRUCTIONS
Methocarbamol (By mouth)   Methocarbamol (meth-oh-NISHA-ba-mol)  Treats muscle pain and spasms  Brand Name(s): Robaxin   There may be other brand names for this medicine  When This Medicine Should Not Be Used: This medicine is not right for everyone  Do not use it if you had an allergic reaction to methocarbamol  How to Use This Medicine:   Tablet  · Take your medicine as directed  Your dose may need to be changed several times to find what works best for you  · Missed dose: Take a dose as soon as you remember  If it is almost time for your next dose, wait until then and take a regular dose  Do not take extra medicine to make up for a missed dose  · Store the medicine in a closed container at room temperature, away from heat, moisture, and direct light  Drugs and Foods to Avoid:   Ask your doctor or pharmacist before using any other medicine, including over-the-counter medicines, vitamins, and herbal products  · Some medicines can affect how methocarbamol works  Tell your doctor if you are using pyridostigmine  · Do not drink alcohol while you are using this medicine  · Tell your doctor if you use anything else that makes you sleepy  Some examples are allergy medicine, narcotic pain medicine, and alcohol  Warnings While Using This Medicine:   · Tell your doctor if you are pregnant or breastfeeding, or if you have kidney disease, liver disease, or myasthenia gravis  · This medicine may make you dizzy or drowsy  Do not drive or do anything that could be dangerous until you know how this medicine affects you  · Tell any doctor or dentist who treats you that you are using this medicine  This medicine may affect certain medical test results  · Your doctor will check your progress and the effects of this medicine at regular visits  Keep all appointments  · Keep all medicine out of the reach of children  Never share your medicine with anyone    Possible Side Effects While Using This Medicine:   Call your doctor right away if you notice any of these side effects:  · Allergic reaction: Itching or hives, swelling in your face or hands, swelling or tingling in your mouth or throat, chest tightness, trouble breathing  · Blurred vision, redness, pain, or swelling of the eyes  · Dark urine or pale stools, nausea, vomiting, loss of appetite, stomach pain, yellow skin or eyes  · Fever  · Lightheadedness, dizziness, fainting  · Seizures  · Slow heartbeat  · Unusual bleeding, bruising, or weakness  If you notice these less serious side effects, talk with your doctor:   · Confusion, trouble sleeping  · Headache  · Warmth or redness in your face, neck, arms, or upper chest  If you notice other side effects that you think are caused by this medicine, tell your doctor  Call your doctor for medical advice about side effects  You may report side effects to FDA at 6-118-FDA-1087    © Copyright [address] 2022 Information is for End User's use only and may not be sold, redistributed or otherwise used for commercial purposes  The above information is an  only  It is not intended as medical advice for individual conditions or treatments  Talk to your doctor, nurse or pharmacist before following any medical regimen to see if it is safe and effective for you

## 2022-04-13 ENCOUNTER — OFFICE VISIT (OUTPATIENT)
Dept: CARDIOLOGY CLINIC | Facility: CLINIC | Age: 57
End: 2022-04-13
Payer: COMMERCIAL

## 2022-04-13 VITALS
HEART RATE: 83 BPM | BODY MASS INDEX: 36.06 KG/M2 | DIASTOLIC BLOOD PRESSURE: 82 MMHG | WEIGHT: 191 LBS | HEIGHT: 61 IN | SYSTOLIC BLOOD PRESSURE: 130 MMHG | OXYGEN SATURATION: 97 %

## 2022-04-13 DIAGNOSIS — M54.12 CERVICAL RADICULOPATHY: ICD-10-CM

## 2022-04-13 DIAGNOSIS — I10 ESSENTIAL HYPERTENSION: ICD-10-CM

## 2022-04-13 DIAGNOSIS — E78.2 HYPERLIPEMIA, MIXED: ICD-10-CM

## 2022-04-13 DIAGNOSIS — R00.2 PALPITATIONS: Primary | ICD-10-CM

## 2022-04-13 PROCEDURE — 99204 OFFICE O/P NEW MOD 45 MIN: CPT | Performed by: INTERNAL MEDICINE

## 2022-04-13 PROCEDURE — 93000 ELECTROCARDIOGRAM COMPLETE: CPT | Performed by: INTERNAL MEDICINE

## 2022-04-13 RX ORDER — WHEAT DEXTRIN/ASPARTAME 3 G/6 G
1 POWDER IN PACKET (EA) ORAL DAILY
Start: 2022-04-13

## 2022-04-13 RX ORDER — EZETIMIBE 10 MG/1
10 TABLET ORAL DAILY
Qty: 30 TABLET | Refills: 6 | Status: SHIPPED | OUTPATIENT
Start: 2022-04-13

## 2022-04-13 NOTE — PROGRESS NOTES
[facility]  Office Consultation  Clementine Tilley 59 y o  female MRN: [id number]        Problems    1  Palpitations  POCT ECG   2  Hyperlipemia, mixed  ezetimibe (ZETIA) 10 mg tablet    wheat dextrin (BENEFIBER)   3  Essential hypertension     4  Cervical radiculopathy         Impression:       Recovering alcoholic, sober 31 year   HTN  o A well controlled   Hyperlipidemia, mixed  o Severe triglyceride elevations in the past up to 700, currently on Krill oil, with some dietary modifications, recent triglycerides 300  The 10-year ASCVD risk score (Adina Tucker et al , 2013) is: 5 5%    Values used to calculate the score:      Age: 59 years      Sex: Female      Is Non- : No      Diabetic: No      Tobacco smoker: No      Systolic Blood Pressure: 657 mmHg      Is BP treated: Yes      HDL Cholesterol: 39 mg/dL  o     Total Cholesterol: 211 mg/dL  o Ideally should be on statin therapy considering her strong family history, but completely statin intolerant   o Does not think she has ever been on Zetia, she has a low-fiber diet especially considering her version to vegetables   Palpitations/Sinus tachycardia  o A reaction to cervical neck injection with steroids  o ECHO was normal  o ECG confirmed sinus tachycardia  o Normal ECG today    Plan     Zetia, Fiber, diet changes recommended   F/u lipids 3 months     She may have subsequent injections knowing she has a mild reaction to steroids, delete only sinus tachycardia discovered      Reason for Consult / Principal Problem:  Palpitations    HPI: Clementine Tliley is a 59y o  year old female with a strong family history of premature heart disease, suicidal death in her brother, overweight, hypertension, mixed hyperlipidemia, history of severe hypertriglyceridemia, comes to see me after having palpitations/racing heart sensation after undergoing cervical neck injection with lidocaine/dexamethasone, the rhythm discovered with sinus tachycardia when she went to the ER at [facility], by the next morning sinus tachycardia had resolved, her echo was normal, and her workup was otherwise unremarkable  She may consider additional cervical injections, and comes to me for assessment of risk  She had a prior stress test which was read as abnormal, but the description appears to suggest breast shadowing, she underwent cardiac catheterization none the less back in 2016 which was normal at that time  Her triglycerides are 300, , total cholesterol over 200, and her HDL low at 39  She does take [place]  She has a lot of intolerance to Tricor, multiple statins, Lopid, does not think she has ever been on Zetia  She has well-controlled blood pressure on losartan/HCTZ  Review of Systems   Constitutional: Negative  HENT: Negative  Eyes: Negative  Respiratory: Negative  Cardiovascular: Positive for palpitations  Gastrointestinal: Negative  Endocrine: Negative  Genitourinary: Negative  Musculoskeletal: Negative  Skin: Negative  Neurological: Negative  Hematological: Negative  Psychiatric/Behavioral: Negative            Past Medical History:   Diagnosis Date    Abscess of back     Resolved 1/11/2018     Adverse reaction to statin medication     Resolved 1/11/2018     Alcoholism ([address] )     AGE 27    Anesthesia complication     extreme dizziness upon awakening    Anxiety     Chronic pain disorder      rt side pinched nerve    Colon polyp     Depression     Edema     GERD (gastroesophageal reflux disease)     Hyperlipidemia     Hypertension     Irregular heart beat     palpitations    Irritable bowel syndrome     Liver disease     fatty    Neck pain     Pinched nerve     Last assessed 9/26/2017     PONV (postoperative nausea and vomiting)     Tuberculosis     1986     Past Surgical History:   Procedure Laterality Date    CARDIAC CATHETERIZATION      due to brothers advanced heart disease and abnormal stress test    COLONOSCOPY N/A 2017    Procedure: COLONOSCOPY;  Surgeon: Dixie Phan MD;  Location: [facility] GI LAB; Service: Gastroenterology    COLONOSCOPY  12    ELBOW SURGERY Right     tendon repair, post op nausea and dizziness    ESOPHAGOGASTRODUODENOSCOPY N/A 2017    Procedure: ESOPHAGOGASTRODUODENOSCOPY (EGD); Surgeon: Dixie Phan MD;  Location: [facility] GI LAB; Service: Gastroenterology    ESOPHAGOGASTRODUODENOSCOPY      Diagnostic     SKIN BIOPSY      Last assessed 2017     UPPER GASTROINTESTINAL ENDOSCOPY      WISDOM TOOTH EXTRACTION       Social History     Substance and Sexual Activity   Alcohol Use No    Comment: 1990     Social History     Substance and Sexual Activity   Drug Use Never     Social History     Tobacco Use   Smoking Status Former Smoker    Packs/day:     Years:     Pack years:     Types: Cigarettes    Quit date: 12    Years since quittin 3   Smokeless Tobacco Never Used     Family History   Problem Relation Age of Onset    Alzheimer's disease Mother     Thyroid disease Mother     Edema Mother     Osteoarthritis Mother         Knee    Stroke Father     Hypertension Father     Hyperlipidemia Father     Arthritis Father     Coronary artery disease Father     Hypothyroidism Father     Other Father         status post coronary artery bypass graft     Stroke Sister     Heart failure Sister     Heart disease Sister     Drug abuse Brother     Completed Suicide  Brother     No Known Problems Maternal Grandmother     No Known Problems Maternal Grandfather     No Known Problems Paternal Grandmother     No Known Problems Paternal Grandfather     Breast cancer Paternal Aunt 48    Colon cancer Maternal Uncle 76    No Known Problems Maternal Aunt     No Known Problems Maternal Aunt        Allergies:   Allergies   Allergen Reactions    Banana - Food Allergy GI Intolerance    Statins Arthralgia Medications:     Current Outpatient Medications:     acetaminophen (TYLENOL) 650 mg CR tablet, Take 650 mg by mouth every 8 (eight) hours as needed for mild pain, Disp: , Rfl:     Calcium Carbonate-Vitamin D (CALCIUM 600+D HIGH POTENCY PO), Take by mouth daily, Disp: , Rfl:     Cholecalciferol (VITAMIN D3) 5000 units CAPS, Take by mouth daily, Disp: , Rfl:     famotidine (PEPCID) 40 MG tablet, Take 1 tablet by mouth daily as needed, Disp: , Rfl:     Flaxseed, Linseed, (FLAX SEED OIL) 1000 MG CAPS, Take 2 capsules by mouth daily , Disp: , Rfl:     gabapentin (NEURONTIN) 300 mg capsule, Take 2 capsules (600 mg total) by mouth 2 (two) times a day, Disp: 120 capsule, Rfl: 1    ibuprofen (MOTRIN) 200 mg tablet, Take by mouth every 6 (six) hours as needed for mild pain , Disp: , Rfl:     losartan-hydrochlorothiazide (HYZAAR) 50-12 5 mg per tablet, Take 1 tablet by mouth daily, Disp: 90 tablet, Rfl: 0    ezetimibe (ZETIA) 10 mg tablet, Take 1 tablet (10 mg total) by mouth daily, Disp: 30 tablet, Rfl: 6    wheat dextrin (BENEFIBER), Take 1 packet by mouth daily, Disp: , Rfl:       Vitals:    04/13/22 1546   BP: 130/82   Pulse: 83   SpO2: 97%     Weight (last 2 days)     Date/Time Weight    04/13/22 1546 86 6 (191)        Physical Exam  Vitals reviewed  Constitutional:       General: She is not in acute distress  Appearance: She is obese  She is not diaphoretic  HENT:      Head: Normocephalic and atraumatic  Eyes:      General: No scleral icterus  Conjunctiva/sclera: Conjunctivae normal       Pupils: Pupils are equal, round, and reactive to light  Neck:      Thyroid: No thyromegaly  Vascular: No JVD  Trachea: No tracheal deviation  Cardiovascular:      Rate and Rhythm: Normal rate and regular rhythm  Heart sounds: Normal heart sounds  No murmur heard  No friction rub  No gallop  Pulmonary:      Effort: Pulmonary effort is normal  No respiratory distress        Breath sounds: Normal breath sounds  No wheezing or rales  Abdominal:      General: Bowel sounds are normal  There is no distension  Palpations: Abdomen is soft  Tenderness: There is no abdominal tenderness  Musculoskeletal:         General: No tenderness or deformity  Normal range of motion  Cervical back: Normal range of motion and neck supple  Right lower leg: No edema  Left lower leg: No edema  Skin:     General: Skin is warm and dry  Findings: No erythema or rash  Neurological:      Mental Status: She is alert and oriented to person, place, and time  Cranial Nerves: No cranial nerve deficit  Psychiatric:         Judgment: Judgment normal            Laboratory Studies:    Laboratory studies personally reviewed    Cardiac testing:     EKG reviewed personally:   Results for orders placed or performed in visit on 04/13/22   POCT ECG    Impression    Normal sinus rhythm, normal EKG       Echocardiogram:  3/16/2022-[facility]-normal echo    Stress test:      Holter:      Cardiac catheterization:        Trevor Avalos MD    Portions of the record may have been created with voice recognition software  Occasional wrong word or "sound a like" substitutions may have occurred due to the inherent limitations of voice recognition software  Read the chart carefully and recognize, using context, where substitutions have occurred

## 2022-04-13 NOTE — PATIENT INSTRUCTIONS
Benefiber or Metamucil are great options, make sure it is the water soluble once, and drink it with a full 16 oz of water in the morning  Start Zetia as well, but I would delay starting 1 of them by at least 2 weeks to make sure that you do not have a side effect and are not sure which 1 caused a side effect    Check her cholesterol in approximately 3 months once your on the fiber and on the Zetia  Try to eat less saturated fat in food, a lot a red meat, chicken wings, pizza, cheese, ground beef, are terrible for you

## 2022-04-14 ENCOUNTER — OFFICE VISIT (OUTPATIENT)
Dept: URGENT CARE | Age: 57
End: 2022-04-14
Payer: COMMERCIAL

## 2022-04-14 VITALS
WEIGHT: 191 LBS | OXYGEN SATURATION: 99 % | SYSTOLIC BLOOD PRESSURE: 130 MMHG | HEIGHT: 61 IN | BODY MASS INDEX: 36.06 KG/M2 | TEMPERATURE: 97.2 F | HEART RATE: 90 BPM | DIASTOLIC BLOOD PRESSURE: 90 MMHG | RESPIRATION RATE: 18 BRPM

## 2022-04-14 DIAGNOSIS — L73.9 FOLLICULITIS: Primary | ICD-10-CM

## 2022-04-14 PROCEDURE — S9083 URGENT CARE CENTER GLOBAL: HCPCS

## 2022-04-14 PROCEDURE — G0382 LEV 3 HOSP TYPE B ED VISIT: HCPCS

## 2022-04-14 RX ORDER — SULFAMETHOXAZOLE AND TRIMETHOPRIM 800; 160 MG/1; MG/1
1 TABLET ORAL EVERY 12 HOURS SCHEDULED
Qty: 10 TABLET | Refills: 0 | Status: SHIPPED | OUTPATIENT
Start: 2022-04-14 | End: 2022-04-19

## 2022-04-14 NOTE — PATIENT INSTRUCTIONS
1  Drink plenty fluids  2   Take probiotics [i e  Yogurt, Acidophilus, Florastor (liquid)] daily  3   Over-the-counter medications as needed for symptomatic care  4    Advance activities as tolerated  5    Follow-up with your primary care physician in 3-4 days  6   Go to emergency room if symptoms are worsening  7   Use a humidifier at bedtime  Folliculitis   WHAT YOU NEED TO KNOW:   What is folliculitis? Folliculitis is inflammation of your hair follicles  A hair follicle is a sac under your skin  Your hair grows out of the follicle  Folliculitis is caused by bacteria or fungus, most commonly a germ called Staph  Folliculitis can occur anywhere you have hair  What increases my risk for folliculitis? · Skin injury:  Injuries to your skin include scratches, cuts, and surgery wounds  Shaving can also cause irritation and injury  Body hair may curve over into a hair follicle and lead to folliculitis  Acne and other conditions that damage your skin may also increase your risk of folliculitis  · Skin to skin contact and sharing personal items:  Skin contact with people who have a skin infection may increase your risk of folliculitis  Sharing items such as towels and bar soap may also increase your risk  · Pools and hot tubs:  Pools and hot tubs that are not cleaned regularly or do not have enough chlorine have more germs  If you use a pool or hot tub that is not cleaned well, you may have a higher risk of folliculitis  · Weak immune system:  Medical problems such as HIV and diabetes weaken your immune system and make it hard to fight infections  · Tight clothing:  When you wear tight clothing, it rubs against your skin and causes irritation in your hair follicles  What are the signs and symptoms of folliculitis?    · One or more small red, white, or yellow rash-like bumps around your hair follicles    · Pus filled bumps that may break open and form a crust on your skin    · Itching, pain, or redness on or around your hair follicles    How is folliculitis diagnosed? Your healthcare provider will examine your skin  Tell him how long you have had symptoms and if you have had folliculitis in the past  Also tell your healthcare provider if you have had other bacterial skin infections in the past   · Skin sample:  One of the bumps on your skin may be removed and sent to a lab for tests  A skin sample may help your healthcare provider learn what is causing your folliculitis  · Wound culture:  Cultures are done to learn what kind of germ caused your infection  A culture may be done by swabbing a draining area on your skin  How is folliculitis treated? Your folliculitis may heal on its own without treatment  If your folliculitis is severe or is not healing, you may need treatment  · Antibiotics: This medicine is given to fight or prevent an infection caused by bacteria  It may be given as an ointment that you apply to your skin or as a pill  Always take your antibiotics exactly as ordered by your healthcare provider  Never save antibiotics or take leftover antibiotics that were given to you for another illness  · Antifungal medicine: This medicine helps kill fungus that may be causing your folliculitis  It may be given as an cream that you apply to your skin or as a pill  · Steroids: This medicine may be given to decrease inflammation  · NSAIDs , such as ibuprofen, help decrease swelling, pain, and fever  This medicine is available with or without a doctor's order  NSAIDs can cause stomach bleeding or kidney problems in certain people  If you take blood thinner medicine, always ask if NSAIDs are safe for you  Always read the medicine label and follow directions  Do not give these medicines to children under 10months of age without direction from your child's healthcare provider  · Antihistamines: This medicine may be given to help decrease itching       · UV light therapy: During this treatment, ultraviolet light is used to help decrease the inflammation on the skin  UV light treatments are only used to treat certain types of folliculitis  What are the risks of folliculitis? If you have a severe infection, you may have scarring on your skin after it heals  Folliculitis may return, even after you are treated  Your hair follicles may be damaged and cause permanent hair loss  How can I manage folliculitis? · Use warm compresses:  Wet a washcloth with warm water and apply it to the infected skin area to help decrease pain and swelling  Warm compresses may also help drain pus and improve healing  · Clean the area:  Use antibacterial soap to wash the affected area  Change your washcloths and towels every day  · Avoid shaving the area: If possible, do not shave areas that have folliculitis  If you must shave, use an electric razor or new blade every time you shave  How can I prevent folliculitis? · Do not share personal items:  Do not share towels, soap, or any personal items with other people  · Do not wear tight clothing:  Do not wear tight-fitting clothes that rub against and irritate your skin  · Treat skin injuries right away:  Treat injuries such as cuts and scrapes right away  Wash them with warm, soapy water, and cover the area to prevent infection  When should I contact my healthcare provider? · You have a fever  · You have foul-smelling pus coming from the bumps on your skin  · Your rash is spreading  · You have questions or concerns about your condition or care  When should I seek immediate care? · You develop large areas of red, warm, tender skin around the folliculitis  · You develop boils  CARE AGREEMENT:   You have the right to help plan your care  Learn about your health condition and how it may be treated  Discuss treatment options with your healthcare providers to decide what care you want to receive   You always have the right to refuse treatment  The above information is an  only  It is not intended as medical advice for individual conditions or treatments  Talk to your doctor, nurse or pharmacist before following any medical regimen to see if it is safe and effective for you  © Copyright [address] 2022 Information is for End User's use only and may not be sold, redistributed or otherwise used for commercial purposes   All illustrations and images included in CareNotes® are the copyrighted property of A D A M , Inc  or [address] [place] [address]

## 2022-04-14 NOTE — PROGRESS NOTES
[facility] [address] [facility]        NAME: Santana Chahal is a 59 y o  female  : 1965    MRN: [id number]  DATE: 2022  TIME: 7:43 PM    Assessment and Plan   Folliculitis [E89 8]  1  Folliculitis  sulfamethoxazole-trimethoprim (BACTRIM DS) 800-160 mg per tablet         Patient Instructions     antibiotics as discussed  Follow up with PCP in 3-5 days  Proceed to  ER if symptoms worsen  Chief Complaint     Chief Complaint   Patient presents with    Recurrent Skin Infections     PATINENT STATED SHE WAS STWISTING HAIR FROM FACE AND CHIN AND IT GOT INFECTED AND ALSO  WARM TO THE TOUCH IT'S BEEN 3 DAYS  History of Present Illness       Patient presenting for evaluation of a skin infection  She states this started 3 days ago  Patient states that she had a few chin hairs that she picked, and developed erythema and pustules to the area  She states that the area was warm to touch  She denies any drainage at this time  Denies any fevers, chills or tenderness to palpation over the affected area  Patient states that this has happened before, and she was treated with oral antibiotics and responded well  Review of Systems   Review of Systems   Constitutional: Negative for chills and fever  HENT: Negative for ear pain and sore throat  Eyes: Negative for pain and visual disturbance  Respiratory: Negative for cough and shortness of breath  Cardiovascular: Negative for chest pain and palpitations  Gastrointestinal: Negative for abdominal pain and vomiting  Genitourinary: Negative for dysuria and hematuria  Musculoskeletal: Negative for arthralgias and back pain  Skin: Positive for rash  Negative for color change  Neurological: Negative for seizures and syncope  All other systems reviewed and are negative          Current Medications       Current Outpatient Medications:     acetaminophen (TYLENOL) 650 mg CR tablet, Take 650 mg by mouth every 8 (eight) hours as needed for mild pain, Disp: , Rfl:     Calcium Carbonate-Vitamin D (CALCIUM 600+D HIGH POTENCY PO), Take by mouth daily, Disp: , Rfl:     Cholecalciferol (VITAMIN D3) 5000 units CAPS, Take by mouth daily, Disp: , Rfl:     ezetimibe (ZETIA) 10 mg tablet, Take 1 tablet (10 mg total) by mouth daily, Disp: 30 tablet, Rfl: 6    famotidine (PEPCID) 40 MG tablet, Take 1 tablet by mouth daily as needed, Disp: , Rfl:     Flaxseed, Linseed, (FLAX SEED OIL) 1000 MG CAPS, Take 2 capsules by mouth daily , Disp: , Rfl:     gabapentin (NEURONTIN) 300 mg capsule, Take 2 capsules (600 mg total) by mouth 2 (two) times a day, Disp: 120 capsule, Rfl: 1    ibuprofen (MOTRIN) 200 mg tablet, Take by mouth every 6 (six) hours as needed for mild pain , Disp: , Rfl:     losartan-hydrochlorothiazide (HYZAAR) 50-12 5 mg per tablet, Take 1 tablet by mouth daily, Disp: 90 tablet, Rfl: 0    wheat dextrin (BENEFIBER), Take 1 packet by mouth daily, Disp: , Rfl:     sulfamethoxazole-trimethoprim (BACTRIM DS) 800-160 mg per tablet, Take 1 tablet by mouth every 12 (twelve) hours for 5 days, Disp: 10 tablet, Rfl: 0    Current Allergies     Allergies as of 04/14/2022 - Reviewed 04/14/2022   Allergen Reaction Noted    Banana - food allergy GI Intolerance 09/14/2020    Statins Arthralgia 03/31/2021            The following portions of the patient's history were reviewed and updated as appropriate: allergies, current medications, past family history, past medical history, past social history, past surgical history and problem list      Past Medical History:   Diagnosis Date    Abscess of back     Resolved 1/11/2018     Adverse reaction to statin medication     Resolved 1/11/2018     Alcoholism ([address] )     AGE 27    Anesthesia complication     extreme dizziness upon awakening    Anxiety     Chronic pain disorder      rt side pinched nerve    Colon polyp     Depression     Edema     GERD (gastroesophageal reflux disease)     Hyperlipidemia     Hypertension     Irregular heart beat     palpitations    Irritable bowel syndrome     Liver disease     fatty    Neck pain     Pinched nerve     Last assessed 9/26/2017     PONV (postoperative nausea and vomiting)     Tuberculosis     1986       Past Surgical History:   Procedure Laterality Date    CARDIAC CATHETERIZATION      due to brothers advanced heart disease and abnormal stress test    COLONOSCOPY N/A 12/6/2017    Procedure: COLONOSCOPY;  Surgeon: Radha Woods MD;  Location: [place] [address] GI LAB; Service: Gastroenterology    COLONOSCOPY  12    ELBOW SURGERY Right     tendon repair, post op nausea and dizziness    ESOPHAGOGASTRODUODENOSCOPY N/A 12/6/2017    Procedure: ESOPHAGOGASTRODUODENOSCOPY (EGD); Surgeon: Rahda Woods MD;  Location[facility]; Service: Gastroenterology    ESOPHAGOGASTRODUODENOSCOPY  1986    Diagnostic     SKIN BIOPSY      Last assessed 9/28/2017     UPPER GASTROINTESTINAL ENDOSCOPY      WISDOM TOOTH EXTRACTION         Family History   Problem Relation Age of Onset    Alzheimer's disease Mother     Thyroid disease Mother     Edema Mother     Osteoarthritis Mother         Knee    Stroke Father     Hypertension Father     Hyperlipidemia Father     Arthritis Father     Coronary artery disease Father     Hypothyroidism Father     Other Father         status post coronary artery bypass graft     Stroke Sister     Heart failure Sister     Heart disease Sister     Drug abuse Brother     Completed Suicide  Brother     No Known Problems Maternal Grandmother     No Known Problems Maternal Grandfather     No Known Problems Paternal Grandmother     No Known Problems Paternal Grandfather     Breast cancer Paternal Aunt 48    Colon cancer Maternal Uncle 76    No Known Problems Maternal Aunt     No Known Problems Maternal Aunt          Medications have been verified          Objective   /90   Pulse 90   Temp (!) 97 2 °F (36 2 °C) (Temporal)   Resp 18  5' 1" (1 549 m)   Wt 86 6 kg (191 lb)   LMP  (LMP Unknown)   SpO2 99%   BMI 36 09 kg/m²        Physical Exam     Physical Exam  Vitals and nursing note reviewed  Constitutional:       General: She is not in acute distress  Appearance: Normal appearance  She is not ill-appearing  HENT:      Head: Normocephalic and atraumatic  Right Ear: Tympanic membrane normal       Left Ear: Tympanic membrane normal       Nose: No congestion or rhinorrhea  Mouth/Throat:      Mouth: Mucous membranes are moist       Pharynx: Oropharynx is clear  No oropharyngeal exudate or posterior oropharyngeal erythema  Eyes:      Extraocular Movements: Extraocular movements intact  Conjunctiva/sclera: Conjunctivae normal       Pupils: Pupils are equal, round, and reactive to light  Cardiovascular:      Rate and Rhythm: Normal rate and regular rhythm  Pulses: Normal pulses  Heart sounds: Normal heart sounds  No murmur heard  No friction rub  No gallop  Pulmonary:      Effort: No respiratory distress  Breath sounds: Normal breath sounds  No wheezing, rhonchi or rales  Abdominal:      General: Bowel sounds are normal       Palpations: Abdomen is soft  Tenderness: There is no abdominal tenderness  Musculoskeletal:         General: No tenderness  Normal range of motion  Cervical back: Normal range of motion and neck supple  Skin:     General: Skin is warm and dry  Capillary Refill: Capillary refill takes less than 2 seconds  Findings: Rash present  Rash is pustular  Neurological:      General: No focal deficit present  Mental Status: She is alert and oriented to person, place, and time     Psychiatric:         Mood and Affect: Mood normal          Behavior: Behavior normal

## 2022-05-02 ENCOUNTER — OFFICE VISIT (OUTPATIENT)
Dept: PAIN MEDICINE | Facility: CLINIC | Age: 57
End: 2022-05-02
Payer: COMMERCIAL

## 2022-05-02 VITALS
SYSTOLIC BLOOD PRESSURE: 146 MMHG | BODY MASS INDEX: 36.06 KG/M2 | HEIGHT: 61 IN | HEART RATE: 93 BPM | DIASTOLIC BLOOD PRESSURE: 98 MMHG | WEIGHT: 191 LBS

## 2022-05-02 DIAGNOSIS — G89.4 CHRONIC PAIN SYNDROME: Primary | ICD-10-CM

## 2022-05-02 DIAGNOSIS — M54.12 CERVICAL RADICULOPATHY: ICD-10-CM

## 2022-05-02 DIAGNOSIS — M47.816 LUMBAR SPONDYLOSIS: ICD-10-CM

## 2022-05-02 DIAGNOSIS — M50.122 CERVICAL DISC DISORDER AT C5-C6 LEVEL WITH RADICULOPATHY: ICD-10-CM

## 2022-05-02 DIAGNOSIS — M48.061 SPINAL STENOSIS OF LUMBAR REGION, UNSPECIFIED WHETHER NEUROGENIC CLAUDICATION PRESENT: ICD-10-CM

## 2022-05-02 DIAGNOSIS — M54.16 LUMBAR RADICULOPATHY: ICD-10-CM

## 2022-05-02 DIAGNOSIS — M50.30 DDD (DEGENERATIVE DISC DISEASE), CERVICAL: ICD-10-CM

## 2022-05-02 PROCEDURE — 99214 OFFICE O/P EST MOD 30 MIN: CPT | Performed by: NURSE PRACTITIONER

## 2022-05-02 NOTE — PROGRESS NOTES
Assessment:  1  Chronic pain syndrome    2  Cervical radiculopathy    3  Lumbar radiculopathy    4  DDD (degenerative disc disease), cervical    5  Cervical disc disorder at C5-C6 level with radiculopathy    6  Lumbar spondylosis    7  Spinal stenosis of lumbar region, unspecified whether neurogenic claudication present        Plan:  1  Patient wishes to wean gabapentin as she feels it is causing worsening anxiety  Patient was given instructions on how to wean during today's office  2  Can repeat right L4 TFESI p r n    3  Patient is non opioid therapy from our practice secondary to history of alcoholism and substance abuse  4  Patient will continue with her home exercise program   5  Patient will follow-up in 4 weeks or sooner if needed    M*Modal software was used to dictate this note  It may contain errors with dictating incorrect words or incorrect spelling  Please contact the provider directly with any questions  History of Present Illness: The patient is a 59 y o  female with a history of alcoholism and substance abuse last seen on 4/4/22 who presents for a follow up office visit in regards to chronic right-sided low back pain with radiculopathy into the right lower extremity secondary to lumbar degenerative disc disease, lumbar spondylosis, lumbar stenosis, lumbar radiculopathy and chronic pain syndrome  The patient denies bowel or bladder incontinence or saddle anesthesia  Patient also complains of right thoracic paraspinal pain  She did have complete resolution of lower extremity symptoms from right L4 TFESI in March 2022  She also experience postprocedural tachycardia  She did present to the emergency room where an EKG and echo were performed, both of which were normal   EKG just showed sinus tachycardia  She was evaluated by Cardiology who cleared her for further injections  She has completed physical therapy without much relief    Last office visit, we increase gabapentin to 600 mg twice a day which she feels has been worsening her anxiety and wishes to wean back down if not off the medication completely  She states she was advised to avoid NSAIDs and Tylenol secondary to elevated LFTs    Patient rates her pain a 4/10 on the numeric pain rating scale  She intermittently has pain in the morning which is described as dull aching, sharp, throbbing, pressure-like and shooting  I have personally reviewed and/or updated the patient's past medical history, past surgical history, family history, social history, current medications, allergies, and vital signs today  Review of Systems:    Review of Systems   Respiratory: Negative for shortness of breath  Cardiovascular: Negative for chest pain  Gastrointestinal: Negative for constipation, diarrhea, nausea and vomiting  Musculoskeletal: Negative for arthralgias, gait problem, joint swelling and myalgias  Skin: Negative for rash  Neurological: Negative for dizziness, seizures and weakness  All other systems reviewed and are negative          Past Medical History:   Diagnosis Date    Abscess of back     Resolved 1/11/2018     Adverse reaction to statin medication     Resolved 1/11/2018     Alcoholism ([facility] [address] )     AGE 27    Anesthesia complication     extreme dizziness upon awakening    Anxiety     Chronic pain disorder      rt side pinched nerve    Colon polyp     Depression     Edema     GERD (gastroesophageal reflux disease)     Hyperlipidemia     Hypertension     Irregular heart beat     palpitations    Irritable bowel syndrome     Liver disease     fatty    Neck pain     Pinched nerve     Last assessed 9/26/2017     PONV (postoperative nausea and vomiting)     Tuberculosis     1986       Past Surgical History:   Procedure Laterality Date    CARDIAC CATHETERIZATION      due to brothers advanced heart disease and abnormal stress test    COLONOSCOPY N/A 12/6/2017    Procedure: COLONOSCOPY;  Surgeon: Dixie Phan MD; Location: [place] [address] GI LAB; Service: Gastroenterology    COLONOSCOPY  12    ELBOW SURGERY Right     tendon repair, post op nausea and dizziness    ESOPHAGOGASTRODUODENOSCOPY N/A 2017    Procedure: ESOPHAGOGASTRODUODENOSCOPY (EGD); Surgeon: Dixie Phan MD;  Location: [facility];   Service: Gastroenterology    ESOPHAGOGASTRODUODENOSCOPY      Diagnostic     SKIN BIOPSY      Last assessed 2017     UPPER GASTROINTESTINAL ENDOSCOPY      WISDOM TOOTH EXTRACTION         Family History   Problem Relation Age of Onset    Alzheimer's disease Mother     Thyroid disease Mother     Edema Mother     Osteoarthritis Mother         Knee    Stroke Father     Hypertension Father     Hyperlipidemia Father     Arthritis Father     Coronary artery disease Father     Hypothyroidism Father     Other Father         status post coronary artery bypass graft     Stroke Sister     Heart failure Sister     Heart disease Sister     Drug abuse Brother     Completed Suicide  Brother     No Known Problems Maternal Grandmother     No Known Problems Maternal Grandfather     No Known Problems Paternal Grandmother     No Known Problems Paternal Grandfather     Breast cancer Paternal Aunt 48    Colon cancer Maternal Uncle 76    No Known Problems Maternal Aunt     No Known Problems Maternal Aunt        Social History     Occupational History    Not on file   Tobacco Use    Smoking status: Former Smoker     Packs/day: 1 00     Years: 9 00     Pack years: 9 00     Types: Cigarettes     Quit date:      Years since quittin 3    Smokeless tobacco: Never Used   Vaping Use    Vaping Use: Never used   Substance and Sexual Activity    Alcohol use: No     Comment: 1990    Drug use: Never    Sexual activity: Not Currently         Current Outpatient Medications:     acetaminophen (TYLENOL) 650 mg CR tablet, Take 650 mg by mouth every 8 (eight) hours as needed for mild pain, Disp: , Rfl:    Calcium Carbonate-Vitamin D (CALCIUM 600+D HIGH POTENCY PO), Take by mouth daily, Disp: , Rfl:     Cholecalciferol (VITAMIN D3) 5000 units CAPS, Take by mouth daily, Disp: , Rfl:     ezetimibe (ZETIA) 10 mg tablet, Take 1 tablet (10 mg total) by mouth daily, Disp: 30 tablet, Rfl: 6    famotidine (PEPCID) 40 MG tablet, Take 1 tablet by mouth daily as needed, Disp: , Rfl:     Flaxseed, Linseed, (FLAX SEED OIL) 1000 MG CAPS, Take 2 capsules by mouth daily , Disp: , Rfl:     gabapentin (NEURONTIN) 300 mg capsule, Take 2 capsules (600 mg total) by mouth 2 (two) times a day, Disp: 120 capsule, Rfl: 1    ibuprofen (MOTRIN) 200 mg tablet, Take by mouth every 6 (six) hours as needed for mild pain , Disp: , Rfl:     losartan-hydrochlorothiazide (HYZAAR) 50-12 5 mg per tablet, Take 1 tablet by mouth daily, Disp: 90 tablet, Rfl: 0    wheat dextrin (BENEFIBER), Take 1 packet by mouth daily, Disp: , Rfl:     Allergies   Allergen Reactions    Banana - Food Allergy GI Intolerance    Statins Arthralgia       Physical Exam:    /98   Pulse 93   Ht 5' 1" (1 549 m)   Wt 86 6 kg (191 lb)   LMP  (LMP Unknown)   BMI 36 09 kg/m²     Constitutional:normal, well developed, well nourished, alert, in no distress and non-toxic and no overt pain behavior  Eyes:anicteric  HEENT:grossly intact  Neck:supple, symmetric, trachea midline and no masses   Pulmonary:even and unlabored  Cardiovascular:No edema or pitting edema present  Skin:Normal without rashes or lesions and well hydrated  Psychiatric:Mood and affect appropriate  Neurologic:Cranial Nerves II-XII grossly intact  Musculoskeletal:normal gait      Imaging  No orders to display     MRI LUMBAR SPINE WITHOUT CONTRAST   INDICATION: [id number]: Radiculopathy, lumbar region  Acute on chronic right-sided low back pain with right lumbar radiculopathy, evaluate for right L4 and L5 lumbar root compression     COMPARISON: X-rays of the lumbar spine dated 1/14/2022   TECHNIQUE: Sagittal T1, sagittal T2, sagittal inversion recovery, axial T1 and axial T2, coronal T2  Imaging performed on 3 0T MRI IMAGE QUALITY: Diagnostic   FINDINGS:   VERTEBRAL BODIES: There are 5 lumbar type vertebral bodies  The vertebral body heights preserved  There is mild apex right scoliosis to the lumbar spine  There is mild to moderate multilevel osteophytic spurring involving the lumbar spine, most prominent at L2-L3 and L4-5  Fatty and edematous endplate changes are noted at L5  No discrete marrow lesions are identified  SACRUM: Normal signal within the sacrum  No evidence of insufficiency or stress fracture  DISTAL CORD AND CONUS: Normal size and signal within the distal cord and conus  PARASPINAL SOFT TISSUES: Paraspinal soft tissues are unremarkable  LOWER THORACIC DISC SPACES: Mild noncompressive lower thoracic degenerative change  LUMBAR DISC SPACES: There is moderate degenerative disc disease at L4-L5  There is mild multilevel degenerative disc disease involving the remainder of the lumbar spine  L1-L2: Normal    L2-L3: There is a small right paracentral disc protrusion which extends into the right neural foramen resulting in mild right neural foramen narrowing  There is no significant central canal or left neural foramen narrowing  L3-L4: There is diffuse disc bulge and mild facet hypertrophy resulting in mild right neural foramen, minimal to mild central canal, and mild left neural foramen narrowing  L4-L5: There is diffuse disc bulge eccentric towards the right with mild bilateral facet hypertrophy  Findings result in mild-to-moderate right neural foramen, mild central canal, and minimal left neural foramen narrowing  On image 12 of series 4   there is possible contact of the exiting right L4 nerve root  L5-S1: There is diffuse disc bulge with small superimposed central disc protrusion with annular fissure  There is mild to moderate bilateral facet hypertrophy   Findings result in minimal right neural foramen, mild central canal, and minimal left   neural foramen narrowing  IMPRESSION:   1  Mild apex right scoliosis with mild to moderate degenerative changes involving the lumbar spine, most prominent at L4-L5 where there is mild to moderate right neural foramen narrowing with possible contact of the exiting right L4 nerve root  2  Additional note is made of minimal to mild central canal and neural foramen narrowing involving the lumbar spine, see above for level by level analysis  No orders of the defined types were placed in this encounter

## 2022-05-31 ENCOUNTER — OFFICE VISIT (OUTPATIENT)
Dept: PAIN MEDICINE | Facility: CLINIC | Age: 57
End: 2022-05-31
Payer: COMMERCIAL

## 2022-05-31 VITALS
BODY MASS INDEX: 36.06 KG/M2 | WEIGHT: 191 LBS | SYSTOLIC BLOOD PRESSURE: 138 MMHG | HEIGHT: 61 IN | DIASTOLIC BLOOD PRESSURE: 81 MMHG | HEART RATE: 74 BPM

## 2022-05-31 DIAGNOSIS — G89.4 CHRONIC PAIN SYNDROME: Primary | ICD-10-CM

## 2022-05-31 DIAGNOSIS — M50.122 CERVICAL DISC DISORDER AT C5-C6 LEVEL WITH RADICULOPATHY: ICD-10-CM

## 2022-05-31 DIAGNOSIS — M47.816 LUMBAR SPONDYLOSIS: ICD-10-CM

## 2022-05-31 DIAGNOSIS — M50.30 DDD (DEGENERATIVE DISC DISEASE), CERVICAL: ICD-10-CM

## 2022-05-31 DIAGNOSIS — M54.16 LUMBAR RADICULOPATHY: ICD-10-CM

## 2022-05-31 DIAGNOSIS — M54.12 CERVICAL RADICULOPATHY: ICD-10-CM

## 2022-05-31 DIAGNOSIS — M48.061 SPINAL STENOSIS OF LUMBAR REGION, UNSPECIFIED WHETHER NEUROGENIC CLAUDICATION PRESENT: ICD-10-CM

## 2022-05-31 PROCEDURE — 99213 OFFICE O/P EST LOW 20 MIN: CPT | Performed by: NURSE PRACTITIONER

## 2022-07-12 DIAGNOSIS — I10 ESSENTIAL HYPERTENSION: ICD-10-CM

## 2022-07-12 RX ORDER — LOSARTAN POTASSIUM AND HYDROCHLOROTHIAZIDE 12.5; 5 MG/1; MG/1
1 TABLET ORAL DAILY
Qty: 90 TABLET | Refills: 0 | Status: SHIPPED | OUTPATIENT
Start: 2022-07-12 | End: 2022-10-27 | Stop reason: SDUPTHER

## 2022-08-31 ENCOUNTER — OFFICE VISIT (OUTPATIENT)
Dept: FAMILY MEDICINE CLINIC | Facility: CLINIC | Age: 57
End: 2022-08-31
Payer: COMMERCIAL

## 2022-08-31 VITALS
BODY MASS INDEX: 35.87 KG/M2 | OXYGEN SATURATION: 97 % | HEIGHT: 61 IN | HEART RATE: 90 BPM | TEMPERATURE: 97.7 F | RESPIRATION RATE: 16 BRPM | WEIGHT: 190 LBS | SYSTOLIC BLOOD PRESSURE: 124 MMHG | DIASTOLIC BLOOD PRESSURE: 80 MMHG

## 2022-08-31 DIAGNOSIS — M79.642 BILATERAL HAND PAIN: ICD-10-CM

## 2022-08-31 DIAGNOSIS — E78.2 HYPERLIPEMIA, MIXED: ICD-10-CM

## 2022-08-31 DIAGNOSIS — Z13.6 SCREENING FOR CARDIOVASCULAR CONDITION: ICD-10-CM

## 2022-08-31 DIAGNOSIS — I10 ESSENTIAL HYPERTENSION: ICD-10-CM

## 2022-08-31 DIAGNOSIS — T46.6X5A ADVERSE REACTION TO STATIN MEDICATION: ICD-10-CM

## 2022-08-31 DIAGNOSIS — M79.641 BILATERAL HAND PAIN: ICD-10-CM

## 2022-08-31 DIAGNOSIS — Z00.00 WELL ADULT EXAM: Primary | ICD-10-CM

## 2022-08-31 PROBLEM — G89.29 CHRONIC LOWER BACK PAIN: Status: ACTIVE | Noted: 2022-03-15

## 2022-08-31 PROBLEM — M54.50 CHRONIC LOWER BACK PAIN: Status: ACTIVE | Noted: 2022-03-15

## 2022-08-31 PROCEDURE — 99396 PREV VISIT EST AGE 40-64: CPT | Performed by: FAMILY MEDICINE

## 2022-08-31 NOTE — PROGRESS NOTES
FAMILY PRACTICE HEALTH MAINTENANCE OFFICE VISIT  [facility]    NAME: Gabby Florian  AGE: 59 y o  SEX: female  : 1965     DATE: 2022    Assessment and Plan     1  Well adult exam  -     CBC; Future    2  Essential hypertension  -     CBC; Future    3  Hyperlipemia, mixed  Assessment & Plan:  Pt is not taking the Zetia states it causes pain    Orders:  -     CBC; Future  -     Comprehensive metabolic panel; Future; Expected date: 2023  -     Lipid Panel with Direct LDL reflex; Future; Expected date: 2023    4  Adverse reaction to statin medication  -     CBC; Future    5  Bilateral hand pain  -     RF Screen w/ Reflex to Titer; Future  -     Cyclic citrul peptide antibody, IgG; Future  -     CBC; Future  -     XR hand 3+ vw right; Future; Expected date: 2022    6  Screening for cardiovascular condition  -     CBC; Future  -     Comprehensive metabolic panel; Future  -     Lipid Panel with Direct LDL reflex; Future        Patient Counseling:   Nutrition: Stressed importance of a well balanced diet, moderation of sodium/saturated fat, caloric balance and sufficient intake of fiber  Exercise: Stressed the importance of regular exercise with a goal of 150 minutes per week  Dental Health: Discussed daily flossing and brushing and regular dental visits     Immunizations reviewed: Up To Date  Discussed benefits of:  Colon Cancer Screening, Mammogram , Cervical Cancer screening and Screening labs  BMI Counseling: Body mass index is 35 9 kg/m²  Discussed with patient's BMI with her  The BMI is above normal  Nutrition recommendations include reducing portion sizes  Return in about 6 months (around 2023) for Recheck          Chief Complaint     Chief Complaint   Patient presents with    Physical Exam     Lamont Melo MA         History of Present Illness     Pt is here for a full physical  Pt did not get labs for physical    Pt states in addition to her physical she has problems to discuss  Pt states she is cocerned she may have rheumatoid arthritis  States pushing buttons with her thumb is sore   Pt states she noticed a lump on her left thum at the joint and the index finger on the rt as well  Pt states her hand shurt  Hands are stiff in the am    Pt had her pap/pelvic by her OBGYN is Up to date      Well Adult Physical   Patient here for a comprehensive physical exam       Diet and Physical Activity  Diet: well balanced diet  Exercise: rarely      Depression Screen  PHQ-2/9 Depression Screening    Little interest or pleasure in doing things: 0 - not at all  Feeling down, depressed, or hopeless: 1 - several days  PHQ-2 Score: 1  PHQ-2 Interpretation: Negative depression screen          General Health  Hearing: Normal:  bilateral  Vision: wears glasses  Dental: regular dental visits    Reproductive Health  No issues       The following portions of the patient's history were reviewed and updated as appropriate: allergies, current medications, past family history, past medical history, past social history, past surgical history and problem list     Review of Systems     Review of Systems   Constitutional: Negative  Negative for activity change, appetite change, chills, diaphoresis and fatigue  HENT: Negative  Negative for dental problem, ear pain, sinus pressure and sore throat  Eyes: Negative  Negative for photophobia, pain, discharge, redness, itching and visual disturbance  Respiratory: Negative for apnea and chest tightness  Cardiovascular: Negative  Negative for chest pain, palpitations and leg swelling  Gastrointestinal: Negative  Negative for abdominal distention, abdominal pain, constipation and diarrhea  Endocrine: Negative  Negative for cold intolerance and heat intolerance  Genitourinary: Negative  Negative for difficulty urinating and dyspareunia  Musculoskeletal: Positive for arthralgias   Negative for back pain    Skin: Negative  Allergic/Immunologic: Negative for environmental allergies  Neurological: Negative  Negative for dizziness  Psychiatric/Behavioral: Negative  Negative for agitation  Past Medical History     Past Medical History:   Diagnosis Date    Abscess of back     Resolved 1/11/2018     Adverse reaction to statin medication     Resolved 1/11/2018     Alcoholism ([address] )     AGE 27    Anesthesia complication     extreme dizziness upon awakening    Anxiety     Chronic pain disorder      rt side pinched nerve    Colon polyp     Depression     Edema     GERD (gastroesophageal reflux disease)     Hyperlipidemia     Hypertension     Irregular heart beat     palpitations    Irritable bowel syndrome     Liver disease     fatty    Neck pain     Pinched nerve     Last assessed 9/26/2017     PONV (postoperative nausea and vomiting)     Tuberculosis     1986       Past Surgical History     Past Surgical History:   Procedure Laterality Date    CARDIAC CATHETERIZATION      due to brothers advanced heart disease and abnormal stress test    COLONOSCOPY N/A 12/6/2017    Procedure: COLONOSCOPY;  Surgeon: Brandi Quiroz MD;  Location: [facility]; Service: Gastroenterology    COLONOSCOPY  12    ELBOW SURGERY Right     tendon repair, post op nausea and dizziness    ESOPHAGOGASTRODUODENOSCOPY N/A 12/6/2017    Procedure: ESOPHAGOGASTRODUODENOSCOPY (EGD); Surgeon: Brandi Quiroz MD;  Location: [facility];   Service: Gastroenterology    ESOPHAGOGASTRODUODENOSCOPY  1986    Diagnostic     SKIN BIOPSY      Last assessed 9/28/2017     UPPER GASTROINTESTINAL ENDOSCOPY      WISDOM TOOTH EXTRACTION         Social History     Social History     Socioeconomic History    Marital status: Single     Spouse name: None    Number of children: None    Years of education: None    Highest education level: None   Occupational History    None   Tobacco Use    Smoking status: Former Smoker Packs/day: 1 00     Years: 9 00     Pack years: 9 00     Types: Cigarettes     Quit date: 12     Years since quittin 6    Smokeless tobacco: Never Used   Vaping Use    Vaping Use: Never used   Substance and Sexual Activity    Alcohol use: No     Comment: 1990    Drug use: Never    Sexual activity: Not Currently   Other Topics Concern    None   Social History Narrative    None     Social Determinants of Health     Financial Resource Strain: Not on file   Food Insecurity: Not on file   Transportation Needs: Not on file   Physical Activity: Not on file   Stress: Not on file   Social Connections: Not on file   Intimate Partner Violence: Not on file   Housing Stability: Not on file       Family History     Family History   Problem Relation Age of Onset    Alzheimer's disease Mother     Thyroid disease Mother     Edema Mother     Osteoarthritis Mother         Knee    Stroke Father     Hypertension Father     Hyperlipidemia Father     Arthritis Father     Coronary artery disease Father     Hypothyroidism Father     Other Father         status post coronary artery bypass graft     Stroke Sister     Heart failure Sister     Heart disease Sister     Drug abuse Brother     Completed Suicide  Brother     No Known Problems Maternal Grandmother     No Known Problems Maternal Grandfather     No Known Problems Paternal Grandmother     No Known Problems Paternal Grandfather     Breast cancer Paternal Aunt 48    Colon cancer Maternal Uncle 76    No Known Problems Maternal Aunt     No Known Problems Maternal Aunt        Current Medications       Current Outpatient Medications:     acetaminophen (TYLENOL) 650 mg CR tablet, Take 650 mg by mouth every 8 (eight) hours as needed for mild pain, Disp: , Rfl:     Calcium Carbonate-Vitamin D (CALCIUM 600+D HIGH POTENCY PO), Take by mouth daily, Disp: , Rfl:     Cholecalciferol (VITAMIN D3) 5000 units CAPS, Take by mouth daily, Disp: , Rfl:    famotidine (PEPCID) 40 MG tablet, Take 1 tablet by mouth daily as needed, Disp: , Rfl:     Flaxseed, Linseed, (FLAX SEED OIL) 1000 MG CAPS, Take 2 capsules by mouth daily , Disp: , Rfl:     ibuprofen (MOTRIN) 200 mg tablet, Take by mouth every 6 (six) hours as needed for mild pain , Disp: , Rfl:     losartan-hydrochlorothiazide (HYZAAR) 50-12 5 mg per tablet, Take 1 tablet by mouth daily, Disp: 90 tablet, Rfl: 0    wheat dextrin (BENEFIBER), Take 1 packet by mouth daily, Disp: , Rfl:      Allergies     Allergies   Allergen Reactions    Banana - Food Allergy GI Intolerance    Statins Arthralgia    Fish Oil [Omega-3 Fatty Acids] Myalgia    Zetia [Ezetimibe] Myalgia       Objective     /80   Pulse 90   Temp 97 7 °F (36 5 °C)   Resp 16   Ht 5' 1" (1 549 m)   Wt 86 2 kg (190 lb)   LMP  (LMP Unknown)   SpO2 97%   BMI 35 90 kg/m²      Physical Exam  Vitals and nursing note reviewed  Constitutional:       General: She is not in acute distress  Appearance: She is well-developed  She is not diaphoretic  HENT:      Head: Normocephalic and atraumatic  Right Ear: External ear normal       Left Ear: External ear normal       Nose: Nose normal       Mouth/Throat:      Pharynx: No oropharyngeal exudate  Eyes:      General: No scleral icterus  Right eye: No discharge  Left eye: No discharge  Pupils: Pupils are equal, round, and reactive to light  Neck:      Thyroid: No thyromegaly  Cardiovascular:      Rate and Rhythm: Normal rate  Heart sounds: Normal heart sounds  No murmur heard  Pulmonary:      Effort: Pulmonary effort is normal  No respiratory distress  Breath sounds: Normal breath sounds  No wheezing  Abdominal:      General: Bowel sounds are normal  There is no distension  Palpations: Abdomen is soft  There is no mass  Tenderness: There is no abdominal tenderness  There is no guarding or rebound     Musculoskeletal:         General: Normal range of motion  Skin:     General: Skin is warm and dry  Findings: No erythema or rash  Neurological:      Mental Status: She is alert        Coordination: Coordination normal       Deep Tendon Reflexes: Reflexes normal    Psychiatric:         Behavior: Behavior normal             Visual Acuity Screening    Right eye Left eye Both eyes   Without correction:      With correction: 20/25 20/25 [address] [place]

## 2022-09-04 ENCOUNTER — APPOINTMENT (OUTPATIENT)
Dept: RADIOLOGY | Age: 57
End: 2022-09-04
Payer: COMMERCIAL

## 2022-09-04 ENCOUNTER — LAB (OUTPATIENT)
Dept: LAB | Age: 57
End: 2022-09-04
Payer: COMMERCIAL

## 2022-09-04 ENCOUNTER — OFFICE VISIT (OUTPATIENT)
Dept: URGENT CARE | Age: 57
End: 2022-09-04
Payer: COMMERCIAL

## 2022-09-04 VITALS
SYSTOLIC BLOOD PRESSURE: 140 MMHG | TEMPERATURE: 97.1 F | DIASTOLIC BLOOD PRESSURE: 82 MMHG | RESPIRATION RATE: 16 BRPM | OXYGEN SATURATION: 97 % | HEART RATE: 77 BPM

## 2022-09-04 DIAGNOSIS — I10 ESSENTIAL HYPERTENSION: ICD-10-CM

## 2022-09-04 DIAGNOSIS — M79.642 BILATERAL HAND PAIN: ICD-10-CM

## 2022-09-04 DIAGNOSIS — R21 RASH: ICD-10-CM

## 2022-09-04 DIAGNOSIS — M79.641 BILATERAL HAND PAIN: ICD-10-CM

## 2022-09-04 DIAGNOSIS — E78.2 HYPERLIPEMIA, MIXED: ICD-10-CM

## 2022-09-04 DIAGNOSIS — T46.6X5A ADVERSE REACTION TO STATIN MEDICATION: ICD-10-CM

## 2022-09-04 DIAGNOSIS — Z00.00 WELL ADULT EXAM: ICD-10-CM

## 2022-09-04 DIAGNOSIS — Z13.6 SCREENING FOR CARDIOVASCULAR CONDITION: ICD-10-CM

## 2022-09-04 DIAGNOSIS — E78.2 MIXED HYPERLIPIDEMIA: ICD-10-CM

## 2022-09-04 DIAGNOSIS — H00.011 HORDEOLUM EXTERNUM OF RIGHT UPPER EYELID: Primary | ICD-10-CM

## 2022-09-04 LAB
ALBUMIN SERPL BCP-MCNC: 4 G/DL (ref 3.5–5)
ALP SERPL-CCNC: 68 U/L (ref 46–116)
ALT SERPL W P-5'-P-CCNC: 58 U/L (ref 12–78)
ANION GAP SERPL CALCULATED.3IONS-SCNC: 5 MMOL/L (ref 4–13)
AST SERPL W P-5'-P-CCNC: 28 U/L (ref 5–45)
BILIRUB SERPL-MCNC: 1.26 MG/DL (ref 0.2–1)
BUN SERPL-MCNC: 10 MG/DL (ref 5–25)
CALCIUM SERPL-MCNC: 9.1 MG/DL (ref 8.3–10.1)
CHLORIDE SERPL-SCNC: 107 MMOL/L (ref 96–108)
CHOLEST SERPL-MCNC: 232 MG/DL
CO2 SERPL-SCNC: 27 MMOL/L (ref 21–32)
CREAT SERPL-MCNC: 0.84 MG/DL (ref 0.6–1.3)
ERYTHROCYTE [DISTWIDTH] IN BLOOD BY AUTOMATED COUNT: 12.3 % (ref 11.6–15.1)
GFR SERPL CREATININE-BSD FRML MDRD: 76 ML/MIN/1.73SQ M
GLUCOSE P FAST SERPL-MCNC: 93 MG/DL (ref 65–99)
HCT VFR BLD AUTO: 44.5 % (ref 34.8–46.1)
HDLC SERPL-MCNC: 36 MG/DL
HGB BLD-MCNC: 15 G/DL (ref 11.5–15.4)
LDLC SERPL DIRECT ASSAY-MCNC: 124 MG/DL (ref 0–100)
MCH RBC QN AUTO: 29 PG (ref 26.8–34.3)
MCHC RBC AUTO-ENTMCNC: 33.7 G/DL (ref 31.4–37.4)
MCV RBC AUTO: 86 FL (ref 82–98)
NONHDLC SERPL-MCNC: 196 MG/DL
PLATELET # BLD AUTO: 239 THOUSANDS/UL (ref 149–390)
PMV BLD AUTO: 10.6 FL (ref 8.9–12.7)
POTASSIUM SERPL-SCNC: 3.9 MMOL/L (ref 3.5–5.3)
PROT SERPL-MCNC: 7.7 G/DL (ref 6.4–8.4)
RBC # BLD AUTO: 5.17 MILLION/UL (ref 3.81–5.12)
SODIUM SERPL-SCNC: 139 MMOL/L (ref 135–147)
TRIGL SERPL-MCNC: 501 MG/DL
WBC # BLD AUTO: 4.98 THOUSAND/UL (ref 4.31–10.16)

## 2022-09-04 PROCEDURE — 73130 X-RAY EXAM OF HAND: CPT

## 2022-09-04 PROCEDURE — 80053 COMPREHEN METABOLIC PANEL: CPT

## 2022-09-04 PROCEDURE — 83721 ASSAY OF BLOOD LIPOPROTEIN: CPT

## 2022-09-04 PROCEDURE — 86430 RHEUMATOID FACTOR TEST QUAL: CPT

## 2022-09-04 PROCEDURE — 80061 LIPID PANEL: CPT

## 2022-09-04 PROCEDURE — 36415 COLL VENOUS BLD VENIPUNCTURE: CPT

## 2022-09-04 PROCEDURE — 85027 COMPLETE CBC AUTOMATED: CPT

## 2022-09-04 PROCEDURE — 86200 CCP ANTIBODY: CPT

## 2022-09-04 PROCEDURE — 99213 OFFICE O/P EST LOW 20 MIN: CPT

## 2022-09-04 RX ORDER — ERYTHROMYCIN 5 MG/G
0.5 OINTMENT OPHTHALMIC EVERY 12 HOURS SCHEDULED
Qty: 14 G | Refills: 0 | Status: SHIPPED | OUTPATIENT
Start: 2022-09-04 | End: 2022-09-11

## 2022-09-04 NOTE — PATIENT INSTRUCTIONS
Please use erythromycin ointment to your right eye once in the morning and once at night for the next 7 days  Please apply hydrocortisone cream to your rash if necessary  If symptoms persist, please follow-up with your primary care provider

## 2022-09-04 NOTE — PROGRESS NOTES
[id number]        NAME: Abilio Narayanan is a 59 y o  female  : 1965    MRN: [id number]  DATE: 2022  TIME: 10:04 AM    Assessment and Plan   Hordeolum externum of right upper eyelid [H00 011]  1  Hordeolum externum of right upper eyelid  erythromycin (ILOTYCIN) ophthalmic ointment   2  Rash           Patient Instructions     Antibiotic eye ointment to rightt eye 2 times daily for 7 days  Cream to rash if needed  Follow up with PCP in 3-5 days  Proceed to  ER if symptoms worsen  Chief Complaint     Chief Complaint   Patient presents with    Eye Problem     Right eye stye, since yesterday, rash on abdomen, red bumps, no itching, for 3 weeks         History of Present Illness       Patient presenting for evaluation of right stye  Patient states that she developed a stye to her right upper eyelid 1 day ago  She denies any drainage from the area  She states that she is having mild eye tearing, but denies any ocular drainage or visual changes  She also complains of a rash on her abdomen that developed 3 weeks ago  She denies any exposure to new lotions, detergents or soaps, but states that she recently moved and was over he did  She states that the rash is non pruritic, and denies any bleeding or drainage from the area  She denies any current treatment for the rash  She states that it is improving  He denies any shortness of breath, chest pains, fevers, chills  Review of Systems   Review of Systems   Constitutional: Negative for chills and fever  HENT: Negative for ear pain and sore throat  Eyes: Positive for pain  Negative for visual disturbance  Respiratory: Negative for cough and shortness of breath  Cardiovascular: Negative for chest pain and palpitations  Gastrointestinal: Negative for abdominal pain and vomiting  Genitourinary: Negative for dysuria and hematuria  Musculoskeletal: Negative for arthralgias and back pain  Skin: Positive for rash  Negative for color change  Neurological: Negative for seizures and syncope  All other systems reviewed and are negative          Current Medications       Current Outpatient Medications:     acetaminophen (TYLENOL) 650 mg CR tablet, Take 650 mg by mouth every 8 (eight) hours as needed for mild pain, Disp: , Rfl:     Calcium Carbonate-Vitamin D (CALCIUM 600+D HIGH POTENCY PO), Take by mouth daily, Disp: , Rfl:     Cholecalciferol (VITAMIN D3) 5000 units CAPS, Take by mouth daily, Disp: , Rfl:     erythromycin (ILOTYCIN) ophthalmic ointment, Administer 0 5 inches to the right eye every 12 (twelve) hours for 7 days, Disp: 14 g, Rfl: 0    famotidine (PEPCID) 40 MG tablet, Take 1 tablet by mouth daily as needed, Disp: , Rfl:     Flaxseed, Linseed, (FLAX SEED OIL) 1000 MG CAPS, Take 2 capsules by mouth daily , Disp: , Rfl:     ibuprofen (MOTRIN) 200 mg tablet, Take by mouth every 6 (six) hours as needed for mild pain , Disp: , Rfl:     losartan-hydrochlorothiazide (HYZAAR) 50-12 5 mg per tablet, Take 1 tablet by mouth daily, Disp: 90 tablet, Rfl: 0    wheat dextrin (Evangelina Budd), Take 1 packet by mouth daily, Disp: , Rfl:     Current Allergies     Allergies as of 09/04/2022 - Reviewed 09/04/2022   Allergen Reaction Noted    Banana - food allergy GI Intolerance 09/14/2020    Statins Arthralgia 03/31/2021    Fish oil [omega-3 fatty acids] Myalgia 08/31/2022    Zetia [ezetimibe] Myalgia 08/31/2022            The following portions of the patient's history were reviewed and updated as appropriate: allergies, current medications, past family history, past medical history, past social history, past surgical history and problem list      Past Medical History:   Diagnosis Date    Abscess of back     Resolved 1/11/2018     Adverse reaction to statin medication     Resolved 1/11/2018     Alcoholism ([facility] [address] )     AGE 27    Anesthesia complication     extreme dizziness upon awakening    Anxiety     Chronic pain disorder      rt side pinched nerve    Colon polyp     Depression     Edema     GERD (gastroesophageal reflux disease)     Hyperlipidemia     Hypertension     Irregular heart beat     palpitations    Irritable bowel syndrome     Liver disease     fatty    Neck pain     Pinched nerve     Last assessed 9/26/2017     PONV (postoperative nausea and vomiting)     Tuberculosis     1986       Past Surgical History:   Procedure Laterality Date    CARDIAC CATHETERIZATION      due to brothers advanced heart disease and abnormal stress test    COLONOSCOPY N/A 12/6/2017    Procedure: COLONOSCOPY;  Surgeon: Ksenia Briceno MD;  Location: [place] [id number] GI LAB; Service: Gastroenterology    COLONOSCOPY  12    ELBOW SURGERY Right     tendon repair, post op nausea and dizziness    ESOPHAGOGASTRODUODENOSCOPY N/A 12/6/2017    Procedure: ESOPHAGOGASTRODUODENOSCOPY (EGD); Surgeon: Ksenia Briceno MD;  Location: [facility] GI [facility];   Service: Gastroenterology    ESOPHAGOGASTRODUODENOSCOPY  1986    Diagnostic     SKIN BIOPSY      Last assessed 9/28/2017     UPPER GASTROINTESTINAL ENDOSCOPY      WISDOM TOOTH EXTRACTION         Family History   Problem Relation Age of Onset    Alzheimer's disease Mother     Thyroid disease Mother     Edema Mother     Osteoarthritis Mother         Knee    Stroke Father     Hypertension Father     Hyperlipidemia Father     Arthritis Father     Coronary artery disease Father     Hypothyroidism Father     Other Father         status post coronary artery bypass graft     Stroke Sister     Heart failure Sister     Heart disease Sister     Drug abuse Brother     Completed Suicide  Brother     No Known Problems Maternal Grandmother     No Known Problems Maternal Grandfather     No Known Problems Paternal Grandmother     No Known Problems Paternal Grandfather     Breast cancer Paternal Aunt 48    Colon cancer Maternal Uncle 76    No Known Problems Maternal Aunt     No Known Problems Maternal Aunt          Medications have been verified  Objective   /82   Pulse 77   Temp (!) 97 1 °F (36 2 °C)   Resp 16   LMP  (LMP Unknown)   SpO2 97%        Physical Exam     Physical Exam  Vitals and nursing note reviewed  Constitutional:       General: She is not in acute distress  Appearance: Normal appearance  She is not ill-appearing, toxic-appearing or diaphoretic  HENT:      Head: Normocephalic and atraumatic  Right Ear: Tympanic membrane normal       Left Ear: Tympanic membrane normal       Nose: Nose normal  No congestion or rhinorrhea  Mouth/Throat:      Mouth: Mucous membranes are moist       Pharynx: Oropharynx is clear  No oropharyngeal exudate or posterior oropharyngeal erythema  Eyes:      General: Lids are normal  Lids are everted, no foreign bodies appreciated  Vision grossly intact  Right eye: Hordeolum (Right upper eyelid) present  No foreign body or discharge  Left eye: No discharge  Extraocular Movements: Extraocular movements intact  Right eye: Normal extraocular motion and no nystagmus  Left eye: Normal extraocular motion and no nystagmus  Conjunctiva/sclera: Conjunctivae normal       Left eye: Left conjunctiva is not injected  Pupils: Pupils are equal, round, and reactive to light  Cardiovascular:      Rate and Rhythm: Normal rate and regular rhythm  Pulses: Normal pulses  Heart sounds: Normal heart sounds  No murmur heard  No friction rub  No gallop  Pulmonary:      Effort: Pulmonary effort is normal  No respiratory distress  Breath sounds: Normal breath sounds  No stridor  No wheezing, rhonchi or rales  Chest:      Chest wall: No tenderness  Abdominal:      General: Abdomen is flat  Bowel sounds are normal       Palpations: Abdomen is soft  Tenderness: There is no abdominal tenderness  There is no guarding or rebound  Musculoskeletal:         General: No tenderness   Normal range of motion  Cervical back: Normal range of motion and neck supple  Skin:     General: Skin is warm and dry  Capillary Refill: Capillary refill takes less than 2 seconds  Findings: Rash (Generalized macular rash to abdomen, no crusting, bleeding or drainage) present  Neurological:      General: No focal deficit present  Mental Status: She is alert and oriented to person, place, and time     Psychiatric:         Mood and Affect: Mood normal          Behavior: Behavior normal

## 2022-09-06 LAB — RHEUMATOID FACT SER QL LA: NEGATIVE

## 2022-09-07 LAB — CCP AB SER IA-ACNC: 1.1

## 2022-09-21 ENCOUNTER — TELEPHONE (OUTPATIENT)
Dept: FAMILY MEDICINE CLINIC | Facility: CLINIC | Age: 57
End: 2022-09-21

## 2022-09-21 DIAGNOSIS — M85.649 BONE CYST OF HAND: Primary | ICD-10-CM

## 2022-09-22 ENCOUNTER — TELEPHONE (OUTPATIENT)
Dept: OBGYN CLINIC | Facility: HOSPITAL | Age: 57
End: 2022-09-22

## 2022-09-22 NOTE — TELEPHONE ENCOUNTER
Patient needs to schedule with hand doc - Bone cyst (Right) - Patient can be reached at [phone]

## 2022-09-22 NOTE — TELEPHONE ENCOUNTER
Called pt to discuss the x rya of the hand, mild arthritis was seen as well as a bone cyst   I feel this is not likely causing her issues  We discussed it should would like to see ortho to make sure its ok

## 2022-10-11 ENCOUNTER — OFFICE VISIT (OUTPATIENT)
Dept: OBGYN CLINIC | Facility: CLINIC | Age: 57
End: 2022-10-11
Payer: COMMERCIAL

## 2022-10-11 VITALS
HEIGHT: 61 IN | DIASTOLIC BLOOD PRESSURE: 84 MMHG | HEART RATE: 91 BPM | SYSTOLIC BLOOD PRESSURE: 137 MMHG | WEIGHT: 190 LBS | BODY MASS INDEX: 35.87 KG/M2

## 2022-10-11 DIAGNOSIS — M19.041 PRIMARY OSTEOARTHRITIS OF RIGHT HAND: Primary | ICD-10-CM

## 2022-10-11 DIAGNOSIS — M79.641 PAIN OF RIGHT HAND: ICD-10-CM

## 2022-10-11 PROCEDURE — 99243 OFF/OP CNSLTJ NEW/EST LOW 30: CPT | Performed by: SURGERY

## 2022-10-11 NOTE — PROGRESS NOTES
ORTHOPAEDIC HAND, WRIST, AND ELBOW OFFICE  VISIT       ASSESSMENT/PLAN:      59 y o female who presents with Right thumb DIP Osteoarthritis      Diagnostics reviewed and physical exam performed  Diagnosis, treatment options and associated risks were discussed with the patient including no treatment, nonsurgical treatment and potential for surgical intervention  The patient was given the opportunity to ask questions regarding each  The patient verbalized understanding of exam findings and treatment plan  We engaged in the shared decision-making process and treatment options were discussed at length with the patient  Surgical and conservative management discussed today along with risks and benefits  Spoke with pt about Osteoarthritic changes in her joints  Advised her that surgical intervention could be discussed at a future appointment if pain and loss of motion become a limiting factor of overall function  Plain films reviewed independently:  No scaphoid cyst identified, AP view is the tubercle overlying the distal scaphoid  Some thumb IP arthritic changes noted  No signs of trigger thumb clinically  Pt understood and had no further questions    Diagnoses and all orders for this visit:    Primary osteoarthritis of right hand    Pain of right hand  -     Ambulatory referral to Orthopedic Surgery        Follow Up:  Return if symptoms worsen or fail to improve  General Discussions:  Osteoarthritis:  The anatomy and physiology of osteoarthritis was discussed with the patient today in the office  Deterioration of the articular cartilage eventually leads to altered mobility at the joint, resulting in joint subluxation, osteophyte formation, cystic changes, as well as subchondral sclerosis  Eventually, pain, limited mobility, and compensatory hypermobility at surrounding joints may develop    While normal activity and usage of the joint may provide a painful experience to the patient, this typically does not result in damage to the limb  Treatment options include splints to decreased joint edema, pain, and inflammation  Therapy exercises to strengthen the surrounding musculature may relieve pain, but do not alter the overall continued development of osteoarthritis  Oral medications, topical medications, corticosteroid injections may decrease pain and increase overall function  Eventually, some patients may require surgical intervention  ____________________________________________________________________________________________________________________________________________      CHIEF COMPLAINT:  Chief Complaint   Patient presents with   • Right Hand - Pain       SUBJECTIVE:  Ritika Springer is a 59y o  year old  female who presents with Right thumb DIP pain    Pt states she has some soreness and lack of motion in the DIP of her right thumb  She states it does bother her with some activities but otherwise she is pain free  She states the dorsal area of her scaphoid is not painful to palpations   She was referred by her PCP          I have personally reviewed all the relevant PMH, PSH, SH, FH, Medications and allergies      PAST MEDICAL HISTORY:  Past Medical History:   Diagnosis Date   • Abscess of back     Resolved 1/11/2018    • Adverse reaction to statin medication     Resolved 1/11/2018    • Alcoholism ([address] )     AGE 27   • Anesthesia complication     extreme dizziness upon awakening   • Anxiety    • Chronic pain disorder      rt side pinched nerve   • Colon polyp    • Depression    • Edema    • GERD (gastroesophageal reflux disease)    • Hyperlipidemia    • Hypertension    • Irregular heart beat     palpitations   • Irritable bowel syndrome    • Liver disease     fatty   • Neck pain    • Pinched nerve     Last assessed 9/26/2017    • PONV (postoperative nausea and vomiting)    • Tuberculosis     1986       PAST SURGICAL HISTORY:  Past Surgical History:   Procedure Laterality Date   • CARDIAC CATHETERIZATION      due to brothers advanced heart disease and abnormal stress test   • COLONOSCOPY N/A 2017    Procedure: COLONOSCOPY;  Surgeon: Timo King MD;  Location: [facility] GI LAB; Service: Gastroenterology   • COLONOSCOPY     • ELBOW SURGERY Right     tendon repair, post op nausea and dizziness   • ESOPHAGOGASTRODUODENOSCOPY N/A 2017    Procedure: ESOPHAGOGASTRODUODENOSCOPY (EGD); Surgeon: Timo King MD;  Location: [facility] GI LAB;   Service: Gastroenterology   • ESOPHAGOGASTRODUODENOSCOPY      Diagnostic    • SKIN BIOPSY      Last assessed 2017    • UPPER GASTROINTESTINAL ENDOSCOPY     • WISDOM TOOTH EXTRACTION         FAMILY HISTORY:  Family History   Problem Relation Age of Onset   • Alzheimer's disease Mother    • Thyroid disease Mother    • Edema Mother    • Osteoarthritis Mother         Knee   • Stroke Father    • Hypertension Father    • Hyperlipidemia Father    • Arthritis Father    • Coronary artery disease Father    • Hypothyroidism Father    • Other Father         status post coronary artery bypass graft    • Stroke Sister    • Heart failure Sister    • Heart disease Sister    • Drug abuse Brother    • Completed Suicide  Brother    • No Known Problems Maternal Grandmother    • No Known Problems Maternal Grandfather    • No Known Problems Paternal Grandmother    • No Known Problems Paternal Grandfather    • Breast cancer Paternal Aunt 48   • Colon cancer Maternal Uncle 76   • No Known Problems Maternal Aunt    • No Known Problems Maternal Aunt        SOCIAL HISTORY:  Social History     Tobacco Use   • Smoking status: Former Smoker     Packs/day: 1 00     Years: 9      Pack years: 9      Types: Cigarettes     Quit date:      Years since quittin 8   • Smokeless tobacco: Never Used   Vaping Use   • Vaping Use: Never used   Substance Use Topics   • Alcohol use: No     Comment: 1990   • Drug use: Never       MEDICATIONS:    Current Outpatient Medications: •  acetaminophen (TYLENOL) 650 mg CR tablet, Take 650 mg by mouth every 8 (eight) hours as needed for mild pain, Disp: , Rfl:   •  Calcium Carbonate-Vitamin D (CALCIUM 600+D HIGH POTENCY PO), Take by mouth daily, Disp: , Rfl:   •  Cholecalciferol (VITAMIN D3) 5000 units CAPS, Take by mouth daily, Disp: , Rfl:   •  famotidine (PEPCID) 40 MG tablet, Take 1 tablet by mouth daily as needed, Disp: , Rfl:   •  Flaxseed, Linseed, (FLAX SEED OIL) 1000 MG CAPS, Take 2 capsules by mouth daily , Disp: , Rfl:   •  ibuprofen (MOTRIN) 200 mg tablet, Take by mouth every 6 (six) hours as needed for mild pain , Disp: , Rfl:   •  losartan-hydrochlorothiazide (HYZAAR) 50-12 5 mg per tablet, Take 1 tablet by mouth daily, Disp: 90 tablet, Rfl: 0  •  wheat dextrin (Vedia Olive), Take 1 packet by mouth daily (Patient not taking: Reported on 10/11/2022), Disp: , Rfl:     ALLERGIES:  Allergies   Allergen Reactions   • Banana - Food Allergy GI Intolerance   • Statins Arthralgia   • Fish Oil [Omega-3 Fatty Acids] Myalgia   • Zetia [Ezetimibe] Myalgia           REVIEW OF SYSTEMS:  Review of Systems   Constitutional: Negative for chills and fever  HENT: Negative for ear pain and sore throat  Eyes: Negative for pain and visual disturbance  Respiratory: Negative for cough and shortness of breath  Cardiovascular: Negative for chest pain and palpitations  Gastrointestinal: Negative for abdominal pain and vomiting  Genitourinary: Negative for dysuria and hematuria  Musculoskeletal: Positive for arthralgias  Negative for back pain  Skin: Negative for color change and rash  Neurological: Negative for seizures and syncope  All other systems reviewed and are negative        VITALS:  Vitals:    10/11/22 1714   BP: 137/84   Pulse: 91       LABS:  HgA1c: No results found for: HGBA1C  BMP:   Lab Results   Component Value Date    CALCIUM 9 1 09/04/2022    K 3 9 09/04/2022    CO2 27 09/04/2022     09/04/2022    BUN 10 09/04/2022 CREATININE 0 84 09/04/2022       _____________________________________________________  PHYSICAL EXAMINATION:  General: well developed and well nourished, alert, oriented times 3 and appears comfortable  Psychiatric: Normal  HEENT: Normocephalic, Atraumatic Trachea Midline, No torticollis  Pulmonary: No audible wheezing or respiratory distress   Abdomen/GI: Non tender, non distended   Cardiovascular: No pitting edema, 2+ radial pulse   Skin: No masses, erythema, lacerations, fluctation, ulcerations  Neurovascular: Sensation Intact to the Median, Ulnar, Radial Nerve, Motor Intact to the Median, Ulnar, Radial Nerve and Pulses Intact  Musculoskeletal: Normal, except as noted in detailed exam and in HPI        MUSCULOSKELETAL EXAMINATION:    Limited flexion of Thumb DIP     ___________________________________________________  STUDIES REVIEWED:  I have personally reviewed AP lateral and oblique radiographs of Right hand   which demonstrate     Osteoarthritis of Right thumb DIP     No cyst noted on X-ray      PROCEDURES PERFORMED:  Procedures  No Procedures performed today    _____________________________________________________      Janice Gutierrez    I,:  Rhys Collins am acting as a scribe while in the presence of the attending physician :       I,:  Dominique Bishop MD personally performed the services described in this documentation    as scribed in my presence :

## 2022-10-24 ENCOUNTER — HOSPITAL ENCOUNTER (EMERGENCY)
Facility: HOSPITAL | Age: 57
Discharge: HOME | End: 2022-10-24
Attending: EMERGENCY MEDICINE
Payer: COMMERCIAL

## 2022-10-24 ENCOUNTER — APPOINTMENT (EMERGENCY)
Dept: RADIOLOGY | Facility: HOSPITAL | Age: 57
End: 2022-10-24
Payer: COMMERCIAL

## 2022-10-24 VITALS
OXYGEN SATURATION: 96 % | DIASTOLIC BLOOD PRESSURE: 82 MMHG | HEART RATE: 76 BPM | TEMPERATURE: 97.9 F | RESPIRATION RATE: 18 BRPM | SYSTOLIC BLOOD PRESSURE: 160 MMHG

## 2022-10-24 DIAGNOSIS — R06.02 SOB (SHORTNESS OF BREATH): ICD-10-CM

## 2022-10-24 DIAGNOSIS — R07.9 CHEST PAIN: Primary | ICD-10-CM

## 2022-10-24 LAB
2HR DELTA HS TROPONIN: 0 NG/L
ALBUMIN SERPL BCP-MCNC: 4.1 G/DL (ref 3.5–5)
ALP SERPL-CCNC: 72 U/L (ref 46–116)
ALT SERPL W P-5'-P-CCNC: 57 U/L (ref 12–78)
ANION GAP SERPL CALCULATED.3IONS-SCNC: 6 MMOL/L (ref 4–13)
AST SERPL W P-5'-P-CCNC: 30 U/L (ref 5–45)
ATRIAL RATE: 78 BPM
BASOPHILS # BLD AUTO: 0.06 THOUSANDS/ÂΜL (ref 0–0.1)
BASOPHILS NFR BLD AUTO: 1 % (ref 0–1)
BILIRUB SERPL-MCNC: 0.87 MG/DL (ref 0.2–1)
BUN SERPL-MCNC: 13 MG/DL (ref 5–25)
CALCIUM SERPL-MCNC: 9.5 MG/DL (ref 8.3–10.1)
CARDIAC TROPONIN I PNL SERPL HS: 17 NG/L
CARDIAC TROPONIN I PNL SERPL HS: 17 NG/L
CHLORIDE SERPL-SCNC: 109 MMOL/L (ref 96–108)
CO2 SERPL-SCNC: 26 MMOL/L (ref 21–32)
CREAT SERPL-MCNC: 0.84 MG/DL (ref 0.6–1.3)
EOSINOPHIL # BLD AUTO: 0.3 THOUSAND/ÂΜL (ref 0–0.61)
EOSINOPHIL NFR BLD AUTO: 5 % (ref 0–6)
ERYTHROCYTE [DISTWIDTH] IN BLOOD BY AUTOMATED COUNT: 12.2 % (ref 11.6–15.1)
FLUAV RNA RESP QL NAA+PROBE: NEGATIVE
FLUBV RNA RESP QL NAA+PROBE: NEGATIVE
GFR SERPL CREATININE-BSD FRML MDRD: 76 ML/MIN/1.73SQ M
GLUCOSE SERPL-MCNC: 128 MG/DL (ref 65–140)
HCT VFR BLD AUTO: 42.9 % (ref 34.8–46.1)
HGB BLD-MCNC: 14.4 G/DL (ref 11.5–15.4)
IMM GRANULOCYTES # BLD AUTO: 0.01 THOUSAND/UL (ref 0–0.2)
IMM GRANULOCYTES NFR BLD AUTO: 0 % (ref 0–2)
LYMPHOCYTES # BLD AUTO: 1.94 THOUSANDS/ÂΜL (ref 0.6–4.47)
LYMPHOCYTES NFR BLD AUTO: 32 % (ref 14–44)
MCH RBC QN AUTO: 29 PG (ref 26.8–34.3)
MCHC RBC AUTO-ENTMCNC: 33.6 G/DL (ref 31.4–37.4)
MCV RBC AUTO: 86 FL (ref 82–98)
MONOCYTES # BLD AUTO: 0.73 THOUSAND/ÂΜL (ref 0.17–1.22)
MONOCYTES NFR BLD AUTO: 12 % (ref 4–12)
NEUTROPHILS # BLD AUTO: 2.98 THOUSANDS/ÂΜL (ref 1.85–7.62)
NEUTS SEG NFR BLD AUTO: 50 % (ref 43–75)
NRBC BLD AUTO-RTO: 0 /100 WBCS
P AXIS: 69 DEGREES
PLATELET # BLD AUTO: 253 THOUSANDS/UL (ref 149–390)
PMV BLD AUTO: 10.3 FL (ref 8.9–12.7)
POTASSIUM SERPL-SCNC: 3.9 MMOL/L (ref 3.5–5.3)
PR INTERVAL: 138 MS
PROT SERPL-MCNC: 7.8 G/DL (ref 6.4–8.4)
QRS AXIS: 59 DEGREES
QRSD INTERVAL: 80 MS
QT INTERVAL: 364 MS
QTC INTERVAL: 414 MS
RBC # BLD AUTO: 4.97 MILLION/UL (ref 3.81–5.12)
RSV RNA RESP QL NAA+PROBE: NEGATIVE
SARS-COV-2 RNA RESP QL NAA+PROBE: NEGATIVE
SODIUM SERPL-SCNC: 141 MMOL/L (ref 135–147)
T WAVE AXIS: 47 DEGREES
VENTRICULAR RATE: 78 BPM
WBC # BLD AUTO: 6.02 THOUSAND/UL (ref 4.31–10.16)

## 2022-10-24 PROCEDURE — 80053 COMPREHEN METABOLIC PANEL: CPT | Performed by: EMERGENCY MEDICINE

## 2022-10-24 PROCEDURE — 36415 COLL VENOUS BLD VENIPUNCTURE: CPT | Performed by: EMERGENCY MEDICINE

## 2022-10-24 PROCEDURE — 93010 ELECTROCARDIOGRAM REPORT: CPT | Performed by: INTERNAL MEDICINE

## 2022-10-24 PROCEDURE — 0241U HB NFCT DS VIR RESP RNA 4 TRGT: CPT | Performed by: EMERGENCY MEDICINE

## 2022-10-24 PROCEDURE — 84484 ASSAY OF TROPONIN QUANT: CPT | Performed by: EMERGENCY MEDICINE

## 2022-10-24 PROCEDURE — 71046 X-RAY EXAM CHEST 2 VIEWS: CPT

## 2022-10-24 PROCEDURE — 93005 ELECTROCARDIOGRAM TRACING: CPT

## 2022-10-24 PROCEDURE — 85025 COMPLETE CBC W/AUTO DIFF WBC: CPT | Performed by: EMERGENCY MEDICINE

## 2022-10-24 NOTE — ED PROVIDER NOTES
History  No chief complaint on file  Pt is a 61yo F who presents for ***          Prior to Admission Medications   Prescriptions Last Dose Informant Patient Reported? Taking?    Calcium Carbonate-Vitamin D (CALCIUM 600+D HIGH POTENCY PO)  Self Yes No   Sig: Take by mouth daily   Cholecalciferol (VITAMIN D3) 5000 units CAPS  Self Yes No   Sig: Take by mouth daily   Flaxseed, Linseed, (FLAX SEED OIL) 1000 MG CAPS  Self Yes No   Sig: Take 2 capsules by mouth daily    acetaminophen (TYLENOL) 650 mg CR tablet  Self Yes No   Sig: Take 650 mg by mouth every 8 (eight) hours as needed for mild pain   famotidine (PEPCID) 40 MG tablet  Self Yes No   Sig: Take 1 tablet by mouth daily as needed   ibuprofen (MOTRIN) 200 mg tablet  Self Yes No   Sig: Take by mouth every 6 (six) hours as needed for mild pain    losartan-hydrochlorothiazide (HYZAAR) 50-12 5 mg per tablet   No No   Sig: Take 1 tablet by mouth daily   wheat dextrin (BENEFIBER)   No No   Sig: Take 1 packet by mouth daily   Patient not taking: Reported on 10/11/2022      Facility-Administered Medications: None       Past Medical History:   Diagnosis Date   • Abscess of back     Resolved 1/11/2018    • Adverse reaction to statin medication     Resolved 1/11/2018    • Alcoholism ([address] )     AGE 26   • Anesthesia complication     extreme dizziness upon awakening   • Anxiety    • Chronic pain disorder      rt side pinched nerve   • Colon polyp    • Depression    • Edema    • GERD (gastroesophageal reflux disease)    • Hyperlipidemia    • Hypertension    • Irregular heart beat     palpitations   • Irritable bowel syndrome    • Liver disease     fatty   • Neck pain    • Pinched nerve     Last assessed 9/26/2017    • PONV (postoperative nausea and vomiting)    • Tuberculosis     1986       Past Surgical History:   Procedure Laterality Date   • CARDIAC CATHETERIZATION      due to brothers advanced heart disease and abnormal stress test   • COLONOSCOPY N/A 12/6/2017 Procedure: COLONOSCOPY;  Surgeon: Dannie Castaneda MD;  Location: [facility]; Service: Gastroenterology   • COLONOSCOPY     • ELBOW SURGERY Right     tendon repair, post op nausea and dizziness   • ESOPHAGOGASTRODUODENOSCOPY N/A 2017    Procedure: ESOPHAGOGASTRODUODENOSCOPY (EGD); Surgeon: Dannie Castaneda MD;  Location: [facility]; Service: Gastroenterology   • ESOPHAGOGASTRODUODENOSCOPY      Diagnostic    • SKIN BIOPSY      Last assessed 2017    • UPPER GASTROINTESTINAL ENDOSCOPY     • WISDOM TOOTH EXTRACTION         Family History   Problem Relation Age of Onset   • Alzheimer's disease Mother    • Thyroid disease Mother    • Edema Mother    • Osteoarthritis Mother         Knee   • Stroke Father    • Hypertension Father    • Hyperlipidemia Father    • Arthritis Father    • Coronary artery disease Father    • Hypothyroidism Father    • Other Father         status post coronary artery bypass graft    • Stroke Sister    • Heart failure Sister    • Heart disease Sister    • Drug abuse Brother    • Completed Suicide  Brother    • No Known Problems Maternal Grandmother    • No Known Problems Maternal Grandfather    • No Known Problems Paternal Grandmother    • No Known Problems Paternal Grandfather    • Breast cancer Paternal Aunt 46   • Colon cancer Maternal Uncle 76   • No Known Problems Maternal Aunt    • No Known Problems Maternal Aunt      I have reviewed and agree with the history as documented      E-Cigarette/Vaping   • E-Cigarette Use Never User      E-Cigarette/Vaping Substances   • Nicotine No    • THC No    • CBD No    • Flavoring No    • Other No    • Unknown No      Social History     Tobacco Use   • Smoking status: Former Smoker     Packs/day: 1 00     Years: 9 00     Pack years: 9 00     Types: Cigarettes     Quit date:      Years since quittin 8   • Smokeless tobacco: Never Used   Vaping Use   • Vaping Use: Never used   Substance Use Topics   • Alcohol use: No     Comment: 09/1990   • Drug use: Never        Review of Systems    Physical Exam  ED Triage Vitals   Temp Pulse Resp BP SpO2   -- -- -- -- --      Temp src Heart Rate Source Patient Position - Orthostatic VS BP Location FiO2 (%)   -- -- -- -- --      Pain Score       --             Orthostatic Vital Signs  There were no vitals filed for this visit  Physical Exam    ED Medications  Medications - No data to display    Diagnostic Studies  Results Reviewed     None                 No orders to display         Procedures  Procedures      ED Course  ED Course as of 10/24/22 1318   Mon Oct 24, 2022   1315 ECG 12 lead  Procedure Note: EKG  Date/Time: 10/24/22 1:15 PM   Interpreted by: Zulma Brody MD  Indications / Diagnosis: CP  ECG reviewed by me, the ED Physician: yes   The EKG demonstrates:  Rhythm: normal sinus  Intervals: normal intervals  Axis: normal axis  QRS/Blocks: normal QRS  ST Changes: No acute ST Changes, no STD/ISA  Q waves inferior and laterally  No significant change from prior  MDM  Number of Diagnoses or Management Options  Diagnosis management comments: Pt is a 61yo F who presents with ***  Exam pertinent for ***  Differential diagnosis to include but not limited to ***  Plan to ***        Disposition  Final diagnoses:   None     ED Disposition     None      Follow-up Information    None         Patient's Medications   Discharge Prescriptions    No medications on file     No discharge procedures on file  PDMP Review     None           ED Provider  Attending physically available and evaluated Danay Benson I managed the patient along with the ED Attending      Electronically Signed by normal  No respiratory distress  Breath sounds: Normal breath sounds  No wheezing or rhonchi  Chest:      Chest wall: Tenderness (L sided) present  Abdominal:      General: Bowel sounds are normal  There is no distension  Palpations: Abdomen is soft  Tenderness: There is no abdominal tenderness  Musculoskeletal:         General: Normal range of motion  Cervical back: Normal range of motion and neck supple  Skin:     General: Skin is warm and dry  Capillary Refill: Capillary refill takes less than 2 seconds  Coloration: Skin is not pale  Findings: No erythema or rash  Neurological:      General: No focal deficit present  Mental Status: She is alert and oriented to person, place, and time  Psychiatric:         Speech: Speech normal          Behavior: Behavior is cooperative  ED Medications  Medications - No data to display    Diagnostic Studies  Results Reviewed     Procedure Component Value Units Date/Time    HS Troponin I 2hr [[id number]]  (Normal) Collected: 10/24/22 1531    Lab Status: Final result Specimen: Blood from Arm, Right Updated: 10/24/22 1617     hs TnI 2hr 17 ng/L      Delta 2hr hsTnI 0 ng/L     FLU/RSV/COVID - if FLU/RSV clinically relevant [[id number]]  (Normal) Collected: 10/24/22 1319    Lab Status: Final result Specimen: Nares from Nose Updated: 10/24/22 1509     SARS-CoV-2 Negative     INFLUENZA A PCR Negative     INFLUENZA B PCR Negative     RSV PCR Negative    Narrative:      FOR PEDIATRIC PATIENTS - copy/paste COVID Guidelines URL to browser: [email or web address]    SARS-CoV-2 assay is a Nucleic Acid Amplification assay intended for the  qualitative detection of nucleic acid from SARS-CoV-2 in nasopharyngeal  swabs  Results are for the presumptive identification of SARS-CoV-2 RNA      Positive results are indicative of infection with SARS-CoV-2, the virus  causing COVID-19, but do not rule out bacterial infection or co-infection  with other viruses  Laboratories within the [place] and its  territories are required to report all positive results to the appropriate  public health authorities  Negative results do not preclude SARS-CoV-2  infection and should not be used as the sole basis for treatment or other  patient management decisions  Negative results must be combined with  clinical observations, patient history, and epidemiological information  This test has not been FDA cleared or approved  This test has been authorized by FDA under an Emergency Use Authorization  (EUA)  This test is only authorized for the duration of time the  declaration that circumstances exist justifying the authorization of the  emergency use of an in vitro diagnostic tests for detection of SARS-CoV-2  virus and/or diagnosis of COVID-19 infection under section 564(b)(1) of  the Act, 21 U  S C  551HTK-4(N)(5), unless the authorization is terminated  or revoked sooner  The test has been validated but independent review by FDA  and CLIA is pending  Test performed using [employer] GeneXpert: This RT-PCR assay targets N2,  a region unique to SARS-CoV-2  A conserved region in the E-gene was chosen  for pan-Sarbecovirus detection which includes SARS-CoV-2  According to CMS-2020-01-R, this platform meets the definition of high-throughput technology      HS Troponin 0hr (reflex protocol) [[id number]]  (Normal) Collected: 10/24/22 1324    Lab Status: Final result Specimen: Blood from Arm, Right Updated: 10/24/22 1403     hs TnI 0hr 17 ng/L     Comprehensive metabolic panel [[id number]]  (Abnormal) Collected: 10/24/22 1324    Lab Status: Final result Specimen: Blood from Arm, Right Updated: 10/24/22 1357     Sodium 141 mmol/L      Potassium 3 9 mmol/L      Chloride 109 mmol/L      CO2 26 mmol/L      ANION GAP 6 mmol/L      BUN 13 mg/dL      Creatinine 0 84 mg/dL      Glucose 128 mg/dL      Calcium 9 5 mg/dL      AST 30 U/L      ALT 57 U/L      Alkaline Phosphatase 72 U/L      Total Protein 7 8 g/dL      Albumin 4 1 g/dL      Total Bilirubin 0 87 mg/dL      eGFR 76 ml/min/1 73sq m     Narrative:      [place] guidelines for Chronic Kidney Disease (CKD):   •  Stage 1 with normal or high GFR (GFR > 90 mL/min/1 73 square meters)  •  Stage 2 Mild CKD (GFR = 60-89 mL/min/1 73 square meters)  •  Stage 3A Moderate CKD (GFR = 45-59 mL/min/1 73 square meters)  •  Stage 3B Moderate CKD (GFR = 30-44 mL/min/1 73 square meters)  •  Stage 4 Severe CKD (GFR = 15-29 mL/min/1 73 square meters)  •  Stage 5 End Stage CKD (GFR <15 mL/min/1 73 square meters)  Note: GFR calculation is accurate only with a steady state creatinine    CBC and differential [[id number]] Collected: 10/24/22 1324    Lab Status: Final result Specimen: Blood from Arm, Right Updated: 10/24/22 1342     WBC 6 02 Thousand/uL      RBC 4 97 Million/uL      Hemoglobin 14 4 g/dL      Hematocrit 42 9 %      MCV 86 fL      MCH 29 0 pg      MCHC 33 6 g/dL      RDW 12 2 %      MPV 10 3 fL      Platelets 292 Thousands/uL      nRBC 0 /100 WBCs      Neutrophils Relative 50 %      Immat GRANS % 0 %      Lymphocytes Relative 32 %      Monocytes Relative 12 %      Eosinophils Relative 5 %      Basophils Relative 1 %      Neutrophils Absolute 2 98 Thousands/µL      Immature Grans Absolute 0 01 Thousand/uL      Lymphocytes Absolute 1 94 Thousands/µL      Monocytes Absolute 0 73 Thousand/µL      Eosinophils Absolute 0 30 Thousand/µL      Basophils Absolute 0 06 Thousands/µL                  XR chest 2 views   Final Result by Miller Dumont MD (10/24 1958)      No acute cardiopulmonary disease                    Workstation performed: [id number]               Procedures  Procedures      ED Course  ED Course as of 11/07/22 1014   Mon Oct 24, 2022   1315 ECG 12 lead  Procedure Note: EKG  Date/Time: 10/24/22 1:15 PM   Interpreted by: Emeka Bledsoe MD  Indications / Diagnosis: CP  ECG reviewed by me, the ED Physician: yes   The EKG demonstrates:  Rhythm: normal sinus  Intervals: normal intervals  Axis: normal axis  QRS/Blocks: normal QRS  ST Changes: No acute ST Changes, no STD/ISA  Q waves inferior and laterally  No significant change from prior  1425 CBC and differential  Reviewed and without actionable derangement  1425 Comprehensive metabolic panel(!)  Reviewed and without actionable derangement  1425 hs TnI 0hr: 17  Will require delta  1510 FLU/RSV/COVID - if FLU/RSV clinically relevant  Negative  1511 XR chest 2 views  No acute findings on wet read  [address]  [address] hsTnI: 0  WNL  HEART Risk Score    Flowsheet Row Most Recent Value   Heart Score Risk Calculator    History 0 Filed at: 10/24/2022 1621   ECG 0 Filed at: 10/24/2022 1621   Age 1 Filed at: 10/24/2022 1621   Risk Factors 2 Filed at: 10/24/2022 1621   Troponin 1 Filed at: 10/24/2022 1621   HEART Score 4 Filed at: 10/24/2022 1621                                MDM  Number of Diagnoses or Management Options  Chest pain  SOB (shortness of breath)  Diagnosis management comments: Pt is a 63yo F who presents with chest pain  Differential diagnosis to include but not limited to ACS, myocarditis, pericarditis, MSK  Will plan for cardiac w/u and COVID/Flu/RSV swab  See ED course for details  All results discussed with pt as well as plan for DC  Pt agreeable to plan  Plan to discharge pt with f/u to PCP and cardiology  Discussed returning the ED with significant worsening of symptoms  Discussed use of over the counter medications as stated on the bottle as needed for pain  Pt expressed understanding of discharge instructions, return precautions, and medication instructions  All questions were answered and pt was discharged without incident              Amount and/or Complexity of Data Reviewed  Clinical lab tests: ordered and reviewed  Tests in the radiology section of CPT®: ordered and reviewed        Disposition  Final diagnoses:   Chest pain   SOB (shortness of breath)     Time reflects when diagnosis was documented in both MDM as applicable and the Disposition within this note     Time User Action Codes Description Comment    10/24/2022  4:21 PM Edwardlarry Carmel Add [R07 9] Chest pain     10/24/2022  4:21 PM Edwardlarry Carmel Ferraro [R06 02] SOB (shortness of breath)       ED Disposition     ED Disposition   Discharge    Condition   Stable    Date/Time   Mon Oct 24, 2022  4:21 PM    [address] discharge to home/self care                 Follow-up Information     Follow up With Specialties Details Why Contact Info Additional Information    Dianne Gandhi,  Family Medicine, Wound Care Call  As needed [place] [id number] [address]       [address] Cardiology Call  As needed [address]  [address] [place] [address] Cardiology [address], [address] [place] [address] Ave          Discharge Medication List as of 10/24/2022  5:20 PM      CONTINUE these medications which have NOT CHANGED    Details   acetaminophen (TYLENOL) 650 mg CR tablet Take 650 mg by mouth every 8 (eight) hours as needed for mild pain, Historical Med      Calcium Carbonate-Vitamin D (CALCIUM 600+D HIGH POTENCY PO) Take by mouth daily, Historical Med      Cholecalciferol (VITAMIN D3) 5000 units CAPS Take by mouth daily, Historical Med      famotidine (PEPCID) 40 MG tablet Take 1 tablet by mouth daily as needed, Historical Med      Flaxseed, Linseed, (FLAX SEED OIL) 1000 MG CAPS Take 2 capsules by mouth daily , Historical Med      ibuprofen (MOTRIN) 200 mg tablet Take by mouth every 6 (six) hours as needed for mild pain , Historical Med      wheat dextrin (BENEFIBER) Take 1 packet by mouth daily, Starting Wed 4/13/2022, No Print losartan-hydrochlorothiazide (HYZAAR) 50-12 5 mg per tablet Take 1 tablet by mouth daily, Starting Tue 7/12/2022, Normal           No discharge procedures on file  PDMP Review     None           ED Provider  Attending physically available and evaluated Viridiana Del Cid I managed the patient along with the ED Attending      Electronically Signed by         Luba Monique MD  11/07/22 9403

## 2022-10-24 NOTE — ED ATTENDING ATTESTATION
10/24/2022  I, Enma Rios MD, saw and evaluated the patient  I have discussed the patient with the resident/non-physician practitioner and agree with the resident's/non-physician practitioner's findings, Plan of Care, and MDM as documented in the resident's/non-physician practitioner's note, except where noted  All available labs and Radiology studies were reviewed  I was present for key portions of any procedure(s) performed by the resident/non-physician practitioner and I was immediately available to provide assistance  At this point I agree with the current assessment done in the Emergency Department  I have conducted an independent evaluation of this patient a history and physical is as follows:    53-year-old woman with history including hypertension and hyperlipidemia presenting with chest pain  Patient states onset of chest pain was 1 week ago, is mostly central chest pain but there is also occasionally some left chest wall pain  Pain is not worse when taking a deep breath  Not worse with exertion  No associated shortness of breath  No fever  Has had a dry cough  No leg pain or swelling  No abdominal pain, nausea or vomiting  There is family history of cardiac disease  On exam patient awake and alert in no acute distress  Head atraumatic normocephalic  Moist mucous membranes  Neck is supple  Heart regular rate rhythm, no murmurs rubs or gallops  Lungs clear to auscultation bilaterally  There is some mild left lateral chest wall tenderness  No central chest wall tenderness  No rashes  No extremity swelling or edema  Will get EKG, labs, chest x-ray, treatment as indicated      ED Course         Critical Care Time  Procedures

## 2022-10-24 NOTE — DISCHARGE INSTRUCTIONS
Follow-up with PCP and cardiology for further care  Contact info provided below if needed  Use over the counter medications as stated on the bottle as needed for symptom control  Return to the ED with new or worsening symptoms

## 2022-10-27 DIAGNOSIS — I10 ESSENTIAL HYPERTENSION: ICD-10-CM

## 2022-10-27 RX ORDER — LOSARTAN POTASSIUM AND HYDROCHLOROTHIAZIDE 12.5; 5 MG/1; MG/1
1 TABLET ORAL DAILY
Qty: 90 TABLET | Refills: 0 | Status: SHIPPED | OUTPATIENT
Start: 2022-10-27 | End: 2022-11-04

## 2022-11-04 DIAGNOSIS — I10 ESSENTIAL HYPERTENSION: ICD-10-CM

## 2022-11-04 RX ORDER — LOSARTAN POTASSIUM AND HYDROCHLOROTHIAZIDE 12.5; 5 MG/1; MG/1
TABLET ORAL
Qty: 90 TABLET | Refills: 0 | Status: SHIPPED | OUTPATIENT
Start: 2022-11-04

## 2022-12-03 ENCOUNTER — OFFICE VISIT (OUTPATIENT)
Dept: FAMILY MEDICINE CLINIC | Facility: CLINIC | Age: 57
End: 2022-12-03

## 2022-12-03 VITALS
OXYGEN SATURATION: 99 % | RESPIRATION RATE: 18 BRPM | DIASTOLIC BLOOD PRESSURE: 60 MMHG | HEART RATE: 86 BPM | SYSTOLIC BLOOD PRESSURE: 110 MMHG | BODY MASS INDEX: 35.68 KG/M2 | WEIGHT: 189 LBS | TEMPERATURE: 97.2 F | HEIGHT: 61 IN

## 2022-12-03 DIAGNOSIS — L08.9 SKIN INFECTION: Primary | ICD-10-CM

## 2022-12-03 RX ORDER — CEPHALEXIN 500 MG/1
500 CAPSULE ORAL EVERY 12 HOURS SCHEDULED
Qty: 20 CAPSULE | Refills: 0 | Status: SHIPPED | OUTPATIENT
Start: 2022-12-03 | End: 2022-12-13

## 2022-12-03 NOTE — PROGRESS NOTES
Assessment/Plan:    1  Skin infection  Comments:  advised to use warm compresses and skin care advised and will follow back for any worsening or no improvement  Orders:  -     cephalexin (KEFLEX) 500 mg capsule; Take 1 capsule (500 mg total) by mouth every 12 (twelve) hours for 10 days          Patient Instructions: Take medication with food  It is important that you take the entire course of antibiotics prescribed  May also take a probiotic of your choice to maintain healthy GI june  Can take some probiotic and yogurt with the medication  Supportive care discussed and advised  Advised to RTO for any worsening and no improvement  Follow up for no improvement and worsening of conditions  Patient advised and educated when to see immediate medical care  Return if symptoms worsen or fail to improve  Future Appointments   Date Time Provider Jaylyn Paul   3/1/2023  6:45 PM Chasidy Stover DO [facility] [employer]   4/5/2023  6:40 AM [facility] 1 [facility] [employer]           Subjective:      Patient ID: Kody Palumbo is a 59 y o  female  Chief Complaint   Patient presents with   • L groin lump noted      Tender  noted 2 weeks ago  [id number]         Vitals:  /60   Pulse 86   Temp (!) 97 2 °F (36 2 °C)   Resp 18   Ht 5' 1" (1 549 m)   Wt 85 7 kg (189 lb)   LMP  (LMP Unknown)   SpO2 99%   BMI 35 71 kg/m²   Wt Readings from Last 3 Encounters:   12/03/22 85 7 kg (189 lb)   10/11/22 86 2 kg (190 lb)   08/31/22 86 2 kg (190 lb)      HPI  Patient stated that does gets lumps in groin area and stated that usually resolves on its own and this time developed about 2 weeks ago and doing warm compresses but no improvement  Denies fever, chills and discharge from area             PHQ-2/9 Depression Screening    Little interest or pleasure in doing things: 0 - not at all  Feeling down, depressed, or hopeless: 0 - not at all  PHQ-2 Score: 0  PHQ-2 Interpretation: Negative depression screen The following portions of the patient's history were reviewed and updated as appropriate: allergies, current medications, past family history, past medical history, past social history, past surgical history and problem list       Review of Systems   Constitutional: Negative  HENT: Negative  Respiratory: Negative  Cardiovascular: Negative  Gastrointestinal: Negative  Genitourinary:        As noted in HPI     Neurological: Negative  Objective:    Social History     Tobacco Use   Smoking Status Former   • Packs/day: 1 00   • Years: 10 00   • Pack years: 10 00   • Types: Cigarettes   • Start date: 12   • Quit date: 12   • Years since quittin 9   Smokeless Tobacco Never       Allergies: Allergies   Allergen Reactions   • Banana - Food Allergy GI Intolerance   • Statins Arthralgia   • Fish Oil [Omega-3 Fatty Acids] Myalgia   • Zetia [Ezetimibe] Myalgia         Current Outpatient Medications   Medication Sig Dispense Refill   • acetaminophen (TYLENOL) 650 mg CR tablet Take 650 mg by mouth every 8 (eight) hours as needed for mild pain     • Calcium Carbonate-Vitamin D (CALCIUM 600+D HIGH POTENCY PO) Take by mouth daily     • cephalexin (KEFLEX) 500 mg capsule Take 1 capsule (500 mg total) by mouth every 12 (twelve) hours for 10 days 20 capsule 0   • Cholecalciferol (VITAMIN D3) 5000 units CAPS Take by mouth daily     • famotidine (PEPCID) 40 MG tablet Take 1 tablet by mouth daily as needed     • Flaxseed, Linseed, (FLAX SEED OIL) 1000 MG CAPS Take 2 capsules by mouth daily      • ibuprofen (MOTRIN) 200 mg tablet Take by mouth every 6 (six) hours as needed for mild pain      • losartan-hydrochlorothiazide (HYZAAR) 50-12 5 mg per tablet TAKE ONE TABLET BY MOUTH EVERY DAY 90 tablet 0   • wheat dextrin (BENEFIBER) Take 1 packet by mouth daily (Patient not taking: Reported on 10/11/2022)       No current facility-administered medications for this visit            Physical Exam  Exam conducted with a chaperone present (Cecelia Mcdonald)  Constitutional:       Appearance: Normal appearance  HENT:      Head: Normocephalic and atraumatic  Nose: Nose normal    Eyes:      Conjunctiva/sclera: Conjunctivae normal    Cardiovascular:      Rate and Rhythm: Normal rate and regular rhythm  Pulses: Normal pulses  Heart sounds: Normal heart sounds  Pulmonary:      Effort: Pulmonary effort is normal       Breath sounds: Normal breath sounds  Genitourinary:      Skin:     General: Skin is warm and dry  Findings: No rash  Neurological:      Mental Status: She is alert and oriented to person, place, and time  Psychiatric:         Mood and Affect: Mood normal          Behavior: Behavior normal          Thought Content:  Thought content normal          Judgment: Judgment normal                      PAUL Castellano

## 2022-12-03 NOTE — PATIENT INSTRUCTIONS
Cephalexin (By mouth)   Cephalexin (egf-d-VCL-in)  Treats infections  This medicine is a cephalosporin antibiotic  Brand Name(s): Keflex   There may be other brand names for this medicine  When This Medicine Should Not Be Used: This medicine is not right for everyone  Do not use this medicine if you had an allergic reaction to cephalexin or another cephalosporin medicine  How to Use This Medicine:   Capsule, Tablet, Tablet for Suspension, Liquid  Your doctor will tell you how much medicine to use  Do not use more than directed  Read and follow the patient instructions that come with this medicine  Talk to your doctor or pharmacist if you have any questions  You may take your medicine with food or milk to avoid stomach upset  Oral liquid: Shake well just before use  Measure the oral liquid medicine with a marked measuring spoon, oral syringe, or medicine cup  Take all of the medicine in your prescription to clear up your infection, even if you feel better after the first few doses  Missed dose: Take a dose as soon as you remember  If it is almost time for your next dose, wait until then and take a regular dose  Do not take extra medicine to make up for a missed dose  Capsule or tablet: Store at room temperature away from heat, moisture, and direct light  Oral liquid: Store in the refrigerator for 14 days  After 14 days, throw away any unused medicine  Do not freeze  Drugs and Foods to Avoid:   Ask your doctor or pharmacist before using any other medicine, including over-the-counter medicines, vitamins, and herbal products  Some medicines and foods can affect how cephalexin works  Tell your doctor if you are also using  Metformin  Probenecid  Warnings While Using This Medicine:   Tell your doctor if you are pregnant or breastfeeding, or if you have kidney disease, liver disease, or a history of digestive problems, such as colitis  Tell your doctor if you are allergic to penicillin    This medicine can cause diarrhea  Call your doctor if the diarrhea becomes severe, does not stop, or is bloody  Do not take any medicine to stop diarrhea until you have talked to your doctor  Diarrhea can occur 2 months or more after you stop taking this medicine  Tell any doctor or dentist who treats you that you are using this medicine  This medicine may affect certain medical test results  Call your doctor if your symptoms do not improve or if they get worse  Keep all medicine out of the reach of children  Never share your medicine with anyone  Possible Side Effects While Using This Medicine:   Call your doctor right away if you notice any of these side effects: Allergic reaction: Itching or hives, swelling in your face or hands, swelling or tingling in your mouth or throat, chest tightness, trouble breathing  Blistering, peeling, red skin rash  Severe diarrhea, especially if bloody or ongoing  Severe stomach pain, vomiting  If you notice these less serious side effects, talk with your doctor:   Mild diarrhea or nausea  If you notice other side effects that you think are caused by this medicine, tell your doctor  Call your doctor for medical advice about side effects  You may report side effects to FDA at 4-123-FDA-1695    © Copyright [employer] 2022 Information is for End User's use only and may not be sold, redistributed or otherwise used for commercial purposes  The above information is an  only  It is not intended as medical advice for individual conditions or treatments  Talk to your doctor, nurse or pharmacist before following any medical regimen to see if it is safe and effective for you

## 2023-01-26 ENCOUNTER — OFFICE VISIT (OUTPATIENT)
Dept: FAMILY MEDICINE CLINIC | Facility: CLINIC | Age: 58
End: 2023-01-26

## 2023-01-26 VITALS
SYSTOLIC BLOOD PRESSURE: 110 MMHG | BODY MASS INDEX: 35.87 KG/M2 | HEIGHT: 61 IN | DIASTOLIC BLOOD PRESSURE: 64 MMHG | HEART RATE: 84 BPM | RESPIRATION RATE: 16 BRPM | WEIGHT: 190 LBS | OXYGEN SATURATION: 96 % | TEMPERATURE: 97.5 F

## 2023-01-26 DIAGNOSIS — G57.11 MERALGIA PARESTHETICA OF RIGHT SIDE: ICD-10-CM

## 2023-01-26 DIAGNOSIS — I10 ESSENTIAL HYPERTENSION: Primary | ICD-10-CM

## 2023-01-26 DIAGNOSIS — K76.0 HEPATIC STEATOSIS: ICD-10-CM

## 2023-01-26 DIAGNOSIS — R22.9 MASS OF SUBCUTANEOUS TISSUE: ICD-10-CM

## 2023-01-26 DIAGNOSIS — M54.40 CHRONIC LOW BACK PAIN WITH SCIATICA, SCIATICA LATERALITY UNSPECIFIED, UNSPECIFIED BACK PAIN LATERALITY: ICD-10-CM

## 2023-01-26 DIAGNOSIS — G89.29 CHRONIC LOW BACK PAIN WITH SCIATICA, SCIATICA LATERALITY UNSPECIFIED, UNSPECIFIED BACK PAIN LATERALITY: ICD-10-CM

## 2023-01-26 RX ORDER — PREDNISONE 20 MG/1
TABLET ORAL
Qty: 32 TABLET | Refills: 0 | Status: SHIPPED | OUTPATIENT
Start: 2023-01-26

## 2023-01-26 NOTE — PROGRESS NOTES
Assessment/Plan:    1  Essential hypertension  Assessment & Plan:  stable      2  Hepatic steatosis  Assessment & Plan:  Last set of LFT's are acceptable  3  Mass of subcutaneous tissue  -     US abdomen limited; Future; Expected date: 01/26/2023    4  Meralgia paresthetica of right side  -     Ambulatory Referral to Physical Therapy; Future  -     predniSONE 20 mg tablet; 4 tabs for three days, 3 tabs for three days, 2 tabs for three days, 1 tab for three days, 1/2 tab for 4 days    5  Chronic low back pain with sciatica, sciatica laterality unspecified, unspecified back pain laterality  -     Ambulatory Referral to Physical Therapy; Future            There are no Patient Instructions on file for this visit  Return for Next scheduled follow up  Subjective:      Patient ID: Sukhwinder Hicks is a 59 y o  female  Chief Complaint   Patient presents with   • cyst on ribcage     wmcma       Pt is sched for "cyst by ribcage"    Pt states she has noticed a lump on her left flank  States she does not think she ever felt it before, states the other day it was swollen but has gone down  States she may have felt some nerve pain from it  Pt states the ant part of her rt thigh is numb  States two weeks before [facility] her back whet into spasma nd this started and did not stop  No weakness in leg on the rt no loss of bowel or bladder control          The following portions of the patient's history were reviewed and updated as appropriate: allergies, current medications, past family history, past medical history, past social history, past surgical history and problem list     Review of Systems   Skin:        Lump in left flank   Neurological: Positive for numbness           Current Outpatient Medications   Medication Sig Dispense Refill   • acetaminophen (TYLENOL) 650 mg CR tablet Take 650 mg by mouth every 8 (eight) hours as needed for mild pain     • Calcium Carbonate-Vitamin D (CALCIUM 600+D HIGH POTENCY PO) Take by mouth daily     • Cholecalciferol (VITAMIN D3) 5000 units CAPS Take by mouth daily     • famotidine (PEPCID) 40 MG tablet Take 1 tablet by mouth daily as needed     • Flaxseed, Linseed, (FLAX SEED OIL) 1000 MG CAPS Take 2 capsules by mouth daily      • ibuprofen (MOTRIN) 200 mg tablet Take by mouth every 6 (six) hours as needed for mild pain      • losartan-hydrochlorothiazide (HYZAAR) 50-12 5 mg per tablet TAKE ONE TABLET BY MOUTH EVERY DAY 90 tablet 0   • predniSONE 20 mg tablet 4 tabs for three days, 3 tabs for three days, 2 tabs for three days, 1 tab for three days, 1/2 tab for 4 days 32 tablet 0   • wheat dextrin (BENEFIBER) Take 1 packet by mouth daily (Patient not taking: Reported on 10/11/2022)       No current facility-administered medications for this visit         Objective:    /64 (BP Location: Right arm, Patient Position: Sitting, Cuff Size: Standard)   Pulse 84   Temp 97 5 °F (36 4 °C) (Temporal)   Resp 16   Ht 5' 1" (1 549 m)   Wt 86 2 kg (190 lb)   LMP  (LMP Unknown)   SpO2 96%   BMI 35 90 kg/m²        Physical Exam  Skin:     Comments: Lima bean sized tender mass in sub q tuissue in left flank, freely mobile                Destin Glimpse, DO

## 2023-02-02 ENCOUNTER — HOSPITAL ENCOUNTER (OUTPATIENT)
Dept: RADIOLOGY | Age: 58
Discharge: HOME | End: 2023-02-02

## 2023-02-02 DIAGNOSIS — R22.9 MASS OF SUBCUTANEOUS TISSUE: ICD-10-CM

## 2023-02-02 DIAGNOSIS — I10 ESSENTIAL HYPERTENSION: ICD-10-CM

## 2023-02-02 RX ORDER — LOSARTAN POTASSIUM AND HYDROCHLOROTHIAZIDE 12.5; 5 MG/1; MG/1
1 TABLET ORAL DAILY
Qty: 90 TABLET | Refills: 0 | Status: SHIPPED | OUTPATIENT
Start: 2023-02-02

## 2023-02-06 ENCOUNTER — EVALUATION (OUTPATIENT)
Dept: PHYSICAL THERAPY | Age: 58
End: 2023-02-06

## 2023-02-06 DIAGNOSIS — G89.29 CHRONIC LOW BACK PAIN WITH SCIATICA, SCIATICA LATERALITY UNSPECIFIED, UNSPECIFIED BACK PAIN LATERALITY: ICD-10-CM

## 2023-02-06 DIAGNOSIS — G57.11 MERALGIA PARESTHETICA OF RIGHT SIDE: Primary | ICD-10-CM

## 2023-02-06 DIAGNOSIS — M54.40 CHRONIC LOW BACK PAIN WITH SCIATICA, SCIATICA LATERALITY UNSPECIFIED, UNSPECIFIED BACK PAIN LATERALITY: ICD-10-CM

## 2023-02-06 NOTE — PROGRESS NOTES
PT Evaluation     Today's date: 2023  Patient name: Sharla Webster  : 1965  MRN: [id number]  Referring provider: Darylene Gauss, DO  Dx:   Encounter Diagnosis     ICD-10-CM    1  Meralgia paresthetica of right side  G57 11 Ambulatory Referral to Physical Therapy      2  Chronic low back pain with sciatica, sciatica laterality unspecified, unspecified back pain laterality  M54 40 Ambulatory Referral to Physical Therapy    G89 29                      Assessment  Assessment details: Pt is a 59 y o  female who presents to IE with chief c/o R-sided low back pain   Signs and symptoms indicate probable diagnosis of mechanical low back pain with positive response to extension-bias and R side-gliding on IE  She has primary impairments of decreased lumbar mobility, decreased lumbar ROM, decreased core strength, decreased LE strength, decreased quadriceps flexibility and femoral nerve flexibility, and increased pain  She is limited functionally as she has difficulty with prolonged sitting, standing, and walking, difficulty completing usual ADL's, and difficulty participating in usual leisure activities  Provided pt with HEP for lumbar ROM/mobility and stretching which she was able to tolerate completing on IE  Educated pt on proper completion of HEP, normal response to exercises, activity modifications, and POC  She verbalizes understanding of all education provided  All questions answered  Pt would benefit from skilled OP PT in order to improve upon impairments and return to PLOF     Impairments: abnormal or restricted ROM, impaired physical strength, lacks appropriate home exercise program, pain with function and poor posture   Understanding of Dx/Px/POC: good   Prognosis: good    Goals  Short term goals  Pt will improve strength in LE's by 1/2 grade in order to improve tolerance to ADL's  Pt will improve lumbar ROM by at least 10% in each direction  Pt will be able to tolerate sitting/standing/walking for 30 minutes with <3/10 pain    Long term goals  Pt will be independent with HEP   Pt will return to PLOF and perform normal leisure activities with a 90% reduction in symptoms by d/c  Pt will be able to tolerate all work duties without pain  Pt will be able to perform all ADL's without pain  Pt will be able to tolerate sitting/standing/walking for 1 hour without pain   Pt will improve FOTO >/= expected         Plan  Planned therapy interventions: patient education, therapeutic exercise, graded exercise, functional ROM exercises, flexibility, home exercise program, manual therapy, activity modification, strengthening, stretching, joint mobilization, massage and therapeutic activities  Frequency: 2x week  Duration in visits: 8  Duration in weeks: 4  Treatment plan discussed with: patient        Subjective Evaluation    History of Present Illness  Mechanism of injury: Pt has had R sided low back pain "forever " She said recently right before  her back went into spasms  She was opening tables setting up for a party and they were heavy  A few days later he back started to spasm  She was unable to decorate for joao  Now she is experiencing numbness in her R thigh  Thigh is numb all the time but it is worse at night when she is laying down  Sometimes it will burn or itch  Pt was on gabapentin for a few years but her back pain would get worse on it so she weaned herself off of it which improved her muscle pain  She hasn't been taking any medication for this  She was prescribed a steroid but she hasn't been taking it  Laying down on her back makes symptoms worse  Pt does do Karate and she feels okay doing this             Recurrent probem    Pain  Current pain ratin  At best pain ratin  At worst pain rating: 10  Quality: sharp, radiating and burning  Relieving factors: rest  Aggravating factors: lifting, walking, standing, stair climbing and sitting  Progression: worsening    Treatments  Previous treatment: physical therapy  Current treatment: physical therapy  Patient Goals  Patient goals for therapy: decreased pain, increased strength and return to sport/leisure activities          Objective     Palpation     Right   Tenderness of the lumbar paraspinals  Trigger point to lumbar paraspinals  Neurological Testing     Sensation     Lumbar   Left   Intact: light touch    Right   Intact: light touch    Reflexes   Left   Patellar (L4): normal (2+)  Achilles (S1): trace (1+)  Clonus sign: negative    Right   Patellar (L4): normal (2+)  Achilles (S1): trace (1+)  Clonus sign: negative    Active Range of Motion     Lumbar   Flexion:  Restriction level: minimal  Extension:  with pain Restriction level: maximal  Left lateral flexion:  Restriction level: moderate  Right lateral flexion:  with pain Restriction level: maximal  Left rotation:  Restriction level: moderate  Right rotation:  Restriction level: moderate    Additional Active Range of Motion Details  Lumbar AROM (% of normal limits):  Flex: 90%  L lat flex: 75%  R lat flex: 50% (P!)  L rot: 75% (P!)  R rot: 75% (P!)    Ext: 10 degrees (P!)    Joint Play     Hypomobile: L1, L2, L3, L4, L5 and S1     Pain: L4, L5 and S1   Mechanical Assessment    Cervical      Thoracic      Lumbar    Lying extension: repeated movements  Pain location: no change  Pain intensity: better  Pain level: decreased  Right sidegliding: repeated movements  Pain location: no change  Pain intensity:better  Pain level: decreased    Strength/Myotome Testing     Left Hip   Planes of Motion   Flexion: 3  Abduction: 4  Adduction: 4    Right Hip   Planes of Motion   Flexion: 4  Abduction: 4  Adduction: 4    Left Knee   Flexion: 4-  Extension: 4-    Right Knee   Flexion: 4  Extension: 4    Left Ankle/Foot   Dorsiflexion: WFL  Plantar flexion: WFL  Right Ankle/Foot   Dorsiflexion: WFL  Plantar flexion: WFL       Tests     Lumbar     Left   Negative passive SLR and slump test      Right   Negative passive SLR  Left Pelvic Girdle/Sacrum   Negative: thigh thrust      Right Pelvic Girdle/Sacrum   Negative: thigh thrust      Left Hip   Negative CHUCKY and FADIR  Right Hip   Negative CHUCKY and FADIR                 Precautions: None of note      Manuals 2/6            Grade III/IV P/A lumbar mobs             Hip flexor stretch             L/S stabilization                          Neuro Re-Ed                                                                                                        Ther Ex             HEP (PPU's, R side-glide, Hip flexor stretch) 10'             TM              Open books             Supine bridge             Dead bugs             TA ball press             SLR's on mat             Paloff press             Cybex leg press             Cybex LAQ             Cybex Hip abd             Cybex ham curl             Sled push             STS with med ball                          Ther Activity             Carrying series                          Gait Training                                       Modalities

## 2023-02-16 ENCOUNTER — OFFICE VISIT (OUTPATIENT)
Dept: PHYSICAL THERAPY | Age: 58
End: 2023-02-16

## 2023-02-16 DIAGNOSIS — G57.11 MERALGIA PARESTHETICA OF RIGHT SIDE: Primary | ICD-10-CM

## 2023-02-16 DIAGNOSIS — G89.29 CHRONIC LOW BACK PAIN WITH SCIATICA, SCIATICA LATERALITY UNSPECIFIED, UNSPECIFIED BACK PAIN LATERALITY: ICD-10-CM

## 2023-02-16 DIAGNOSIS — M54.40 CHRONIC LOW BACK PAIN WITH SCIATICA, SCIATICA LATERALITY UNSPECIFIED, UNSPECIFIED BACK PAIN LATERALITY: ICD-10-CM
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
## 2023-02-21 ENCOUNTER — OFFICE VISIT (OUTPATIENT)
Dept: PHYSICAL THERAPY | Age: 58
End: 2023-02-21

## 2023-02-21 DIAGNOSIS — G57.11 MERALGIA PARESTHETICA OF RIGHT SIDE: Primary | ICD-10-CM

## 2023-02-21 DIAGNOSIS — G89.29 CHRONIC LOW BACK PAIN WITH SCIATICA, SCIATICA LATERALITY UNSPECIFIED, UNSPECIFIED BACK PAIN LATERALITY: ICD-10-CM

## 2023-02-21 DIAGNOSIS — M54.40 CHRONIC LOW BACK PAIN WITH SCIATICA, SCIATICA LATERALITY UNSPECIFIED, UNSPECIFIED BACK PAIN LATERALITY: ICD-10-CM

## 2023-02-21 NOTE — PROGRESS NOTES
Daily Note     Today's date: 2023  Patient name: Chavez Negro  : 1965  MRN: [id number]  Referring provider: Jono Pham DO  Dx:   Encounter Diagnosis     ICD-10-CM    1  Meralgia paresthetica of right side  G57 11       2  Chronic low back pain with sciatica, sciatica laterality unspecified, unspecified back pain laterality  M54 40     G89 29                      Subjective: Pt states that her R thigh is starting to feel "normal" again  She feels better when she's moving during her Karate classes but does get sore afterwards  Still has R sided thoracic pain  Objective: See treatment diary below      Assessment: Pt responds positively to lumbar P/A mobilizations in prone for symptom relief  Decreased lumbar extension ROM but improves following prone press ups  Challenged by hip SLR's on mat 2* weakness and fatigue  Good response to core strengthening exercises  Tolerated treatment well  Patient demonstrated fatigue post treatment, exhibited good technique with therapeutic exercises and would benefit from continued PT      Plan: Continue per plan of care  Progress treatment as tolerated         Precautions: None of note      Manuals           Grade III/IV P/A lumbar mobs  NR NR          Hip flexor stretch  30"x3 NT          L/S stabilization                          Neuro Re-Ed                                                                                                        Ther Ex             HEP (PPU's, R side-glide, Hip flexor stretch) 10'             TM   10'  10'           Open books             Supine bridge  3x10  3x10           Dead bugs  x30 x30           TA ball press  5"x30  5"x30          SLR's on mat  3x10 ea flex, abd, ext 3x10 ea flex, abd, ext          Paloff press  x15 R+L 6 5# x15 R+L 6 5#          Cybex leg press  3x10 70# 3x10 70#          Cybex LAQ             Cybex Hip abd  3x10 30# 3x10 30#          Cybex ham curl             Sled push             STS with med ball  2x10 3rd med ball 3x10 3rd med ball                        Ther Activity             Carrying series                          Gait Training                                       Modalities

## 2023-02-23 ENCOUNTER — OFFICE VISIT (OUTPATIENT)
Dept: PHYSICAL THERAPY | Age: 58
End: 2023-02-23

## 2023-02-23 DIAGNOSIS — G57.11 MERALGIA PARESTHETICA OF RIGHT SIDE: Primary | ICD-10-CM

## 2023-02-23 DIAGNOSIS — M54.40 CHRONIC LOW BACK PAIN WITH SCIATICA, SCIATICA LATERALITY UNSPECIFIED, UNSPECIFIED BACK PAIN LATERALITY: ICD-10-CM

## 2023-02-23 DIAGNOSIS — G89.29 CHRONIC LOW BACK PAIN WITH SCIATICA, SCIATICA LATERALITY UNSPECIFIED, UNSPECIFIED BACK PAIN LATERALITY: ICD-10-CM

## 2023-02-23 NOTE — PROGRESS NOTES
Daily Note     Today's date: 2023  Patient name: Arnaldo De La Cruz  : 1965  MRN: [id number]  Referring provider: Xavier Mendoza DO  Dx:   Encounter Diagnosis     ICD-10-CM    1  Meralgia paresthetica of right side  G57 11       2  Chronic low back pain with sciatica, sciatica laterality unspecified, unspecified back pain laterality  M54 40     G89 29                      Subjective: Pt offers no new complaints      Objective: See treatment diary below      Assessment: Pt able to tolerate added weight to LE's during dead bug exercise for increased core strength  Able to tolerate incorporation of LAQ for more LE strengthening  Tolerated treatment well  Patient demonstrated fatigue post treatment, exhibited good technique with therapeutic exercises and would benefit from continued PT      Plan: Continue per plan of care  Progress treatment as tolerated         Precautions: None of note      Manuals          Grade III/IV P/A lumbar mobs  NR NR NR         Hip flexor stretch  30"x3 NT          L/S stabilization                          Neuro Re-Ed                                                                                                        Ther Ex             HEP (PPU's, R side-glide, Hip flexor stretch) 10'             TM   10'  10'  10'          Open books             Supine bridge  3x10  3x10  3x10          Dead bugs  x30 x30  x30         TA ball press  5"x30  5"x30 5"x30         SLR's on mat  3x10 ea flex, abd, ext 3x10 ea flex, abd, ext 3x10 ea flex, abd, ext         Paloff press  x15 R+L 6 5# x15 R+L 6 5# x15 8 5#          Cybex leg press  3x10 70# 3x10 70# 3x10 70#         Cybex LAQ    3x10 24# (w/ lumbar roll)         Cybex Hip abd  3x10 30# 3x10 30# 3x10 30#         Cybex ham curl             Sled push             STS with med ball  2x10 3rd med ball 3x10 3rd med ball  3x10 3rd med ball                       Ther Activity             Carrying series Gait Training                                       Modalities

## 2023-02-28 ENCOUNTER — OFFICE VISIT (OUTPATIENT)
Dept: PHYSICAL THERAPY | Age: 58
End: 2023-02-28

## 2023-02-28 DIAGNOSIS — M54.40 CHRONIC LOW BACK PAIN WITH SCIATICA, SCIATICA LATERALITY UNSPECIFIED, UNSPECIFIED BACK PAIN LATERALITY: ICD-10-CM

## 2023-02-28 DIAGNOSIS — G57.11 MERALGIA PARESTHETICA OF RIGHT SIDE: Primary | ICD-10-CM

## 2023-02-28 DIAGNOSIS — G89.29 CHRONIC LOW BACK PAIN WITH SCIATICA, SCIATICA LATERALITY UNSPECIFIED, UNSPECIFIED BACK PAIN LATERALITY: ICD-10-CM

## 2023-02-28 NOTE — PROGRESS NOTES
Daily Note     Today's date: 2023  Patient name: Sheree Perez  : 1965  MRN: [id number]  Referring provider: Jensen Price DO  Dx:   Encounter Diagnosis     ICD-10-CM    1  Meralgia paresthetica of right side  G57 11       2  Chronic low back pain with sciatica, sciatica laterality unspecified, unspecified back pain laterality  M54 40     G89 29                      Subjective: Pt states that she is experiencing increased symptoms in R sided rib cage area  N/t in R thigh is getting better but she does sometimes get symptoms at night  Objective: See treatment diary below      Assessment: Incorporated open book for thoracic stretching and mobility which pt responded positively to to improve symptoms  Also incorporated seated QL stretch to improve lateral flexion and increase stretching between ribs  Symptoms overall reduced following tx session  Challenged by [employer] on mat and LE strengthening with cybex machines  Tolerated treatment well  Patient demonstrated fatigue post treatment, exhibited good technique with therapeutic exercises and would benefit from continued PT      Plan: Continue per plan of care  Progress treatment as tolerated         Precautions: None of note      Manuals         Grade III/IV P/A lumbar mobs  NR NR NR NR        Hip flexor stretch  30"x3 NT          L/S stabilization                          Neuro Re-Ed                                                                                                        Ther Ex             HEP (PPU's, R side-glide, Hip flexor stretch) 10'             TM   10'  10'  10'  10'        Open books     5'x20         Supine bridge  3x10  3x10  3x10  3x10         Dead bugs  x30 x30  x30 x30 1 5#        TA ball press  5"x30  5"x30 5"x30         SLR's on mat  3x10 ea flex, abd, ext 3x10 ea flex, abd, ext 3x10 ea flex, abd, ext 3x10 ea flex, abd, ext        Paloff press  x15 R+L 6 5# x15 R+L 6 5# x15 8 5#  x15 8 5# Cybex leg press  3x10 70# 3x10 70# 3x10 70# 3x10 70#        Cybex LAQ    3x10 24# (w/ lumbar roll) 3x10 24# (w/ lumbar roll)        Cybex Hip abd  3x10 30# 3x10 30# 3x10 30# 3x10 35#        Cybex ham curl             Sled push             STS with med ball  2x10 3rd med ball 3x10 3rd med ball  3x10 3rd med ball  3x10 3rd med ball        Seated QL stretch     20"x5        Ther Activity             Carrying series                          Gait Training                                       Modalities

## 2023-03-01 ENCOUNTER — APPOINTMENT (OUTPATIENT)
Dept: RADIOLOGY | Age: 58
End: 2023-03-01

## 2023-03-01 ENCOUNTER — OFFICE VISIT (OUTPATIENT)
Dept: URGENT CARE | Age: 58
End: 2023-03-01

## 2023-03-01 VITALS
RESPIRATION RATE: 18 BRPM | DIASTOLIC BLOOD PRESSURE: 74 MMHG | SYSTOLIC BLOOD PRESSURE: 137 MMHG | TEMPERATURE: 97.7 F | HEART RATE: 80 BPM | OXYGEN SATURATION: 98 %

## 2023-03-01 DIAGNOSIS — R07.81 RIB PAIN: ICD-10-CM

## 2023-03-01 DIAGNOSIS — R07.81 RIB PAIN: Primary | ICD-10-CM
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
## 2023-03-01 NOTE — PATIENT INSTRUCTIONS
X-rays reviewed, no abnormality noted, awaiting official read  Continue to alternate Tylenol 650 mg every 4-6 hours with Ibuprofen 600 mg every 6-8 hours as needed for pain  Topicals such as lidocaine patches, Biofreeze and heat may be helpful adjuncts for pain management  Follow up with primary care provider within 1-2 weeks

## 2023-03-02 ENCOUNTER — OFFICE VISIT (OUTPATIENT)
Dept: PHYSICAL THERAPY | Age: 58
End: 2023-03-02

## 2023-03-02 DIAGNOSIS — G89.29 CHRONIC LOW BACK PAIN WITH SCIATICA, SCIATICA LATERALITY UNSPECIFIED, UNSPECIFIED BACK PAIN LATERALITY: ICD-10-CM

## 2023-03-02 DIAGNOSIS — G57.11 MERALGIA PARESTHETICA OF RIGHT SIDE: Primary | ICD-10-CM

## 2023-03-02 DIAGNOSIS — M54.40 CHRONIC LOW BACK PAIN WITH SCIATICA, SCIATICA LATERALITY UNSPECIFIED, UNSPECIFIED BACK PAIN LATERALITY: ICD-10-CM

## 2023-03-02 NOTE — PROGRESS NOTES
Daily Note     Today's date: 3/2/2023  Patient name: Amanda Doherty  : 1965  MRN: [id number]  Referring provider: Karina Pereira DO  Dx:   Encounter Diagnosis     ICD-10-CM    1  Meralgia paresthetica of right side  G57 11       2  Chronic low back pain with sciatica, sciatica laterality unspecified, unspecified back pain laterality  M54 40     G89 29                      Subjective: Pt states that R sided thoracic pain has flared up  She went to urgent care where they took x-rays which were negative  Her thigh numbness is not present and low back pain is better  Objective: See treatment diary below      Assessment: Pt continues to respond positively to lumbar P/A mobilizations, lumbar/thoracic mobility, and therex to reduce symptoms  Improve LE strength as she reports improved ability to complete SLR's on mat  Able to increased resistance on leg press and tolerated sled push well  Tolerated treatment well  Patient demonstrated fatigue post treatment, exhibited good technique with therapeutic exercises and would benefit from continued PT      Plan: Continue per plan of care  Progress treatment as tolerated         Precautions: None of note      Manuals 2/6 2/16 2/21 2/23 2/28 3/2       Grade III/IV P/A lumbar mobs  NR NR NR NR NR       Hip flexor stretch  30"x3 NT          L/S stabilization                          Neuro Re-Ed                                                                                                        Ther Ex             HEP (PPU's, R side-glide, Hip flexor stretch) 10'             TM   10'  10'  10'  10' 10'        Open books     5'x20  5"x20 b/l       Supine bridge  3x10  3x10  3x10  3x10  3x10       Dead bugs  x30 x30  x30 x30 1 5# x30 1 5#       TA ball press  5"x30  5"x30 5"x30  5"x30       SLR's on mat  3x10 ea flex, abd, ext 3x10 ea flex, abd, ext 3x10 ea flex, abd, ext 3x10 ea flex, abd, ext 3x10 ea       Paloff press  x15 R+L 6 5# x15 R+L 6 5# x15 8 5#  x15 8 5# x15 8 5#       Cybex leg press  3x10 70# 3x10 70# 3x10 70# 3x10 70# 3x10 70#       Cybex LAQ    3x10 24# (w/ lumbar roll) 3x10 24# (w/ lumbar roll) 3x10 24# (w/ lumbar roll)       Cybex Hip abd  3x10 30# 3x10 30# 3x10 30# 3x10 35# 3x10 35#       Cybex ham curl             Sled push             STS with med ball  2x10 3rd med ball 3x10 3rd med ball  3x10 3rd med ball  3x10 3rd med ball 3x10 3rd med ball        Seated QL stretch     20"x5        Ther Activity             Carrying series                          Gait Training                                       Modalities

## 2023-03-03 ENCOUNTER — OFFICE VISIT (OUTPATIENT)
Dept: FAMILY MEDICINE CLINIC | Facility: CLINIC | Age: 58
End: 2023-03-03

## 2023-03-03 VITALS
HEART RATE: 80 BPM | HEIGHT: 61 IN | RESPIRATION RATE: 16 BRPM | SYSTOLIC BLOOD PRESSURE: 128 MMHG | OXYGEN SATURATION: 97 % | WEIGHT: 195 LBS | TEMPERATURE: 97.4 F | DIASTOLIC BLOOD PRESSURE: 70 MMHG | BODY MASS INDEX: 36.82 KG/M2

## 2023-03-03 DIAGNOSIS — K21.9 GASTROESOPHAGEAL REFLUX DISEASE, UNSPECIFIED WHETHER ESOPHAGITIS PRESENT: ICD-10-CM

## 2023-03-03 DIAGNOSIS — R10.9 ACUTE FLANK PAIN: Primary | ICD-10-CM

## 2023-03-03 DIAGNOSIS — I10 ESSENTIAL HYPERTENSION: ICD-10-CM

## 2023-03-03 RX ORDER — CYCLOBENZAPRINE HCL 10 MG
10 TABLET ORAL
Qty: 10 TABLET | Refills: 0 | Status: SHIPPED | OUTPATIENT
Start: 2023-03-03
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
## 2023-03-07 ENCOUNTER — APPOINTMENT (OUTPATIENT)
Dept: PHYSICAL THERAPY | Age: 58
End: 2023-03-07

## 2023-03-09 ENCOUNTER — APPOINTMENT (OUTPATIENT)
Dept: PHYSICAL THERAPY | Age: 58
End: 2023-03-09

## 2023-03-14 ENCOUNTER — APPOINTMENT (OUTPATIENT)
Dept: PHYSICAL THERAPY | Age: 58
End: 2023-03-14

## 2023-03-16 ENCOUNTER — APPOINTMENT (OUTPATIENT)
Dept: PHYSICAL THERAPY | Age: 58
End: 2023-03-16

## 2023-03-21 ENCOUNTER — APPOINTMENT (OUTPATIENT)
Dept: PHYSICAL THERAPY | Age: 58
End: 2023-03-21

## 2023-03-23 ENCOUNTER — APPOINTMENT (OUTPATIENT)
Dept: PHYSICAL THERAPY | Age: 58
End: 2023-03-23

## 2023-03-24 ENCOUNTER — OFFICE VISIT (OUTPATIENT)
Dept: FAMILY MEDICINE CLINIC | Facility: CLINIC | Age: 58
End: 2023-03-24

## 2023-03-24 VITALS
RESPIRATION RATE: 16 BRPM | BODY MASS INDEX: 36.63 KG/M2 | HEART RATE: 84 BPM | SYSTOLIC BLOOD PRESSURE: 150 MMHG | HEIGHT: 61 IN | TEMPERATURE: 96 F | WEIGHT: 194 LBS | DIASTOLIC BLOOD PRESSURE: 82 MMHG

## 2023-03-24 DIAGNOSIS — J06.9 ACUTE URI: Primary | ICD-10-CM

## 2023-03-24 DIAGNOSIS — I10 ESSENTIAL HYPERTENSION: ICD-10-CM

## 2023-03-24 LAB — S PYO AG THROAT QL: NEGATIVE

## 2023-03-24 RX ORDER — BENZONATATE 200 MG/1
200 CAPSULE ORAL 3 TIMES DAILY PRN
Qty: 20 CAPSULE | Refills: 0 | Status: SHIPPED | OUTPATIENT
Start: 2023-03-24 | End: 2023-04-01

## 2023-03-24 RX ORDER — PERPHENAZINE 16 MG
TABLET ORAL DAILY
COMMUNITY

## 2023-03-24 NOTE — PROGRESS NOTES
Assessment/Plan:    1  Acute URI  -     benzonatate (TESSALON) 200 MG capsule; Take 1 capsule (200 mg total) by mouth 3 (three) times a day as needed for cough  -     POCT rapid strepA    2  Essential hypertension  Assessment & Plan:  SBP slightly elevated and advised to check at home daily and if consistently above 140/90 then will follow back in office              Patient Instructions:  Increase fluid intake, saline nasal rinses, and hot tea with honey and lemon  Cool air humidification can be helpful as well  May take Tylenol as needed for pain or fevers  Mucinex D for sinus congestion or Coricidin HBP if you have high blood pressure or a heart condition  Mucinex or Robitussin DM are effective for cough and chest congestion  Supportive care discussed and advised  Advised to RTO for any worsening and no improvement  Follow up for no improvement and worsening of conditions  Patient advised and educated when to see immediate medical care  Return if symptoms worsen or fail to improve  Future Appointments   Date Time Provider Jaylyn Paul   4/5/2023  6:40 AM [facility] [place]           Subjective:      Patient ID: Rissa Medina is a 59 y o  female  Chief Complaint   Patient presents with   • Sore Throat     Started 2 days ago [id number]         Vitals:  /82   Pulse 84   Temp (!) 96 °F (35 6 °C)   Resp 16   Ht 5' 1" (1 549 m)   Wt 88 kg (194 lb)   LMP  (LMP Unknown)   BMI 36 66 kg/m²     HPI  Patient stated that started with sore throat couple of days ago and progressed to cough, ear pain, and headache  Denies fever, chills  Stated that gets slight SOB at times which is chronic and nothing changed  Complaint with BP medications           PHQ-2/9 Depression Screening    Little interest or pleasure in doing things: 1 - several days  Feeling down, depressed, or hopeless: 1 - several days  PHQ-2 Score: 2  PHQ-2 Interpretation: Negative depression screen The following portions of the patient's history were reviewed and updated as appropriate: allergies, current medications, past family history, past medical history, past social history, past surgical history and problem list       Review of Systems   Constitutional: Negative for chills, diaphoresis, fatigue, fever and unexpected weight change  HENT: Positive for ear pain and sore throat  Negative for congestion, dental problem, drooling, ear discharge, facial swelling, hearing loss, mouth sores, nosebleeds, postnasal drip, rhinorrhea, sinus pressure, sinus pain, sneezing, tinnitus, trouble swallowing and voice change  Respiratory: Positive for cough  Negative for chest tightness, shortness of breath and wheezing  Cardiovascular: Negative  Gastrointestinal: Negative for abdominal pain, constipation, diarrhea, nausea and vomiting  Musculoskeletal: Negative  Skin: Negative  Neurological: Positive for headaches  Negative for dizziness and light-headedness  Objective:    Social History     Tobacco Use   Smoking Status Former   • Packs/day: 1 00   • Years: 10 00   • Pack years: 10 00   • Types: Cigarettes   • Start date: 12   • Quit date: 12   • Years since quittin 2   Smokeless Tobacco Never       Allergies:    Allergies   Allergen Reactions   • Banana - Food Allergy GI Intolerance   • Statins Arthralgia   • Fish Oil [Omega-3 Fatty Acids] Myalgia     Lovaza   • Zetia [Ezetimibe] Myalgia         Current Outpatient Medications   Medication Sig Dispense Refill   • acetaminophen (TYLENOL) 650 mg CR tablet Take 650 mg by mouth every 8 (eight) hours as needed for mild pain     • Alpha-Lipoic Acid 600 MG CAPS Take by mouth in the morning     • benzonatate (TESSALON) 200 MG capsule Take 1 capsule (200 mg total) by mouth 3 (three) times a day as needed for cough 20 capsule 0   • Calcium Carbonate-Vitamin D (CALCIUM 600+D HIGH POTENCY PO) Take by mouth daily     • Cholecalciferol (VITAMIN D3) 5000 units CAPS Take by mouth daily     • famotidine (PEPCID) 40 MG tablet Take 1 tablet by mouth daily as needed     • Flaxseed, Linseed, (FLAX SEED OIL) 1000 MG CAPS Take 2 capsules by mouth daily      • ibuprofen (MOTRIN) 200 mg tablet Take by mouth every 6 (six) hours as needed for mild pain      • losartan-hydrochlorothiazide (HYZAAR) 50-12 5 mg per tablet Take 1 tablet by mouth daily 90 tablet 0     No current facility-administered medications for this visit  Physical Exam  Vitals reviewed  Constitutional:       Appearance: Normal appearance  She is well-developed  HENT:      Head: Normocephalic  Right Ear: Tympanic membrane, ear canal and external ear normal       Left Ear: Tympanic membrane, ear canal and external ear normal       Nose: Nose normal       Right Sinus: No maxillary sinus tenderness or frontal sinus tenderness  Left Sinus: No maxillary sinus tenderness or frontal sinus tenderness  Mouth/Throat:      Mouth: No oral lesions  Pharynx: No oropharyngeal exudate or posterior oropharyngeal erythema  Cardiovascular:      Rate and Rhythm: Normal rate and regular rhythm  Heart sounds: Normal heart sounds  Pulmonary:      Effort: Pulmonary effort is normal       Breath sounds: Normal breath sounds  Musculoskeletal:         General: Normal range of motion  Cervical back: Neck supple  Lymphadenopathy:      Cervical:      Right cervical: No superficial or posterior cervical adenopathy  Left cervical: No superficial or posterior cervical adenopathy  Skin:     General: Skin is warm and dry  Neurological:      Mental Status: She is alert and oriented to person, place, and time  Psychiatric:         Behavior: Behavior normal          Thought Content:  Thought content normal          Judgment: Judgment normal                      PAUL Rueda

## 2023-03-24 NOTE — PATIENT INSTRUCTIONS
Upper Respiratory Infection   WHAT YOU NEED TO KNOW:   An upper respiratory infection is also called a cold  It can affect your nose, throat, ears, and sinuses  Cold symptoms are usually worst for the first 3 to 5 days  Most people get better in 7 to 14 days  You may continue to cough for 2 to 3 weeks  Colds are caused by viruses and do not get better with antibiotics  DISCHARGE INSTRUCTIONS:   Call your local emergency number (911 in the [address] [place] [address],[address]) if:   You have chest pain or trouble breathing  Return to the emergency department if:   You have a fever over 102ºF (39ºC)  Call your doctor if:   You have a low fever  Your sore throat gets worse or you see white or yellow spots in your throat  Your symptoms get worse after 3 to 5 days or are not better in 14 days  You have a rash anywhere on your skin  You have large, tender lumps in your neck  You have thick, green, or yellow drainage from your nose  You cough up thick yellow, green, or bloody mucus  You have a bad earache  You have questions or concerns about your condition or care  Medicines: You may need any of the following:  Decongestants  help reduce nasal congestion and help you breathe more easily  If you take decongestant pills, they may make you feel restless or cause problems with your sleep  Do not use decongestant sprays for more than a few days  Cough suppressants  help reduce coughing  Ask your healthcare provider which type of cough medicine is best for you  NSAIDs , such as ibuprofen, help decrease swelling, pain, and fever  NSAIDs can cause stomach bleeding or kidney problems in certain people  If you take blood thinner medicine, always ask your healthcare provider if NSAIDs are safe for you  Always read the medicine label and follow directions  Acetaminophen  decreases pain and fever  It is available without a doctor's order  Ask how much to take and how often to take it  Follow directions   Read the labels of all other medicines you are using to see if they also contain acetaminophen, or ask your doctor or pharmacist  Acetaminophen can cause liver damage if not taken correctly  Take your medicine as directed  Contact your healthcare provider if you think your medicine is not helping or if you have side effects  Tell your provider if you are allergic to any medicine  Keep a list of the medicines, vitamins, and herbs you take  Include the amounts, and when and why you take them  Bring the list or the pill bottles to follow-up visits  Carry your medicine list with you in case of an emergency  Self-care:   Rest as much as possible  Slowly start to do more each day  Drink more liquids as directed  Liquids will help thin and loosen mucus so you can cough it up  Liquids will also help prevent dehydration  Liquids that help prevent dehydration include water, fruit juice, and broth  Do not drink liquids that contain caffeine  Caffeine can increase your risk for dehydration  Ask your healthcare provider how much liquid to drink each day  Soothe a sore throat  Gargle with warm salt water  Make salt water by dissolving ¼ teaspoon salt in 1 cup warm water  You may also suck on hard candy or throat lozenges  You may use a sore throat spray  Use a humidifier or vaporizer  Use a cool mist humidifier or a vaporizer to increase air moisture in your home  This may make it easier for you to breathe and help decrease your cough  Use saline nasal drops as directed  These help relieve congestion  Apply petroleum-based jelly around the outside of your nostrils  This can decrease irritation from blowing your nose  Do not smoke  Nicotine and other chemicals in cigarettes and cigars can make your symptoms worse  They can also cause infections such as bronchitis or pneumonia  Ask your healthcare provider for information if you currently smoke and need help to quit   E-cigarettes or smokeless tobacco still contain nicotine  Talk to your healthcare provider before you use these products  Prevent a cold: Wash your hands often  Use soap and water every time you wash your hands  Rub your soapy hands together, lacing your fingers  Use the fingers of one hand to scrub under the nails of the other hand  Wash for at least 20 seconds  Rinse with warm, running water for several seconds  Then dry your hands  Use hand  gel if soap and water are not available  Do not touch your eyes or mouth without washing your hands first          Cover a sneeze or cough  Use a tissue that covers your mouth and nose  Put the used tissue in the trash right away  Use the bend of your arm if a tissue is not available  Wash your hands well with soap and water or use a hand   Do not stand close to anyone who is sneezing or coughing  Try to stay away from others while you are sick  This is especially important during the first 2 to 3 days when the virus is more easily spread  Wait until a fever, cough, or other symptoms are gone before you return to work or other regular activities  Do not share items while you are sick  This includes food, drinks, eating utensils, and dishes  Follow up with your doctor as directed:  Write down your questions so you remember to ask them during your visits  © Copyright [facility] 2022 Information is for End User's use only and may not be sold, redistributed or otherwise used for commercial purposes  The above information is an  only  It is not intended as medical advice for individual conditions or treatments  Talk to your doctor, nurse or pharmacist before following any medical regimen to see if it is safe and effective for you

## 2023-03-24 NOTE — ASSESSMENT & PLAN NOTE
SBP slightly elevated and advised to check at home daily and if consistently above 140/90 then will follow back in office

## 2023-03-28 ENCOUNTER — APPOINTMENT (OUTPATIENT)
Dept: PHYSICAL THERAPY | Age: 58
End: 2023-03-28

## 2023-03-30 ENCOUNTER — APPOINTMENT (OUTPATIENT)
Dept: PHYSICAL THERAPY | Age: 58
End: 2023-03-30

## 2023-04-01 ENCOUNTER — OFFICE VISIT (OUTPATIENT)
Dept: FAMILY MEDICINE CLINIC | Facility: CLINIC | Age: 58
End: 2023-04-01

## 2023-04-01 VITALS
WEIGHT: 194 LBS | SYSTOLIC BLOOD PRESSURE: 120 MMHG | BODY MASS INDEX: 36.63 KG/M2 | TEMPERATURE: 97.6 F | HEIGHT: 61 IN | OXYGEN SATURATION: 96 % | DIASTOLIC BLOOD PRESSURE: 70 MMHG | HEART RATE: 99 BPM | RESPIRATION RATE: 16 BRPM

## 2023-04-01 DIAGNOSIS — J01.00 ACUTE NON-RECURRENT MAXILLARY SINUSITIS: Primary | ICD-10-CM

## 2023-04-01 DIAGNOSIS — I10 ESSENTIAL HYPERTENSION: ICD-10-CM

## 2023-04-01 PROBLEM — R09.1 PLEURISY: Status: RESOLVED | Noted: 2019-12-27 | Resolved: 2023-04-01

## 2023-04-01 PROBLEM — H66.001 ACUTE SUPPURATIVE OTITIS MEDIA OF RIGHT EAR WITHOUT SPONTANEOUS RUPTURE OF TYMPANIC MEMBRANE: Status: RESOLVED | Noted: 2020-09-14 | Resolved: 2023-04-01

## 2023-04-01 RX ORDER — AMOXICILLIN AND CLAVULANATE POTASSIUM 875; 125 MG/1; MG/1
1 TABLET, FILM COATED ORAL 2 TIMES DAILY
Qty: 20 TABLET | Refills: 0 | Status: SHIPPED | OUTPATIENT
Start: 2023-04-01 | End: 2023-04-11

## 2023-04-01 NOTE — PROGRESS NOTES
Assessment/Plan:    No problem-specific Assessment & Plan notes found for this encounter  Diagnoses and all orders for this visit:    Acute non-recurrent maxillary sinusitis  -     amoxicillin-clavulanate (AUGMENTIN) 875-125 mg per tablet; Take 1 tablet by mouth 2 (two) times a day for 10 days    Essential hypertension        Continue mucinex DM and flonase  htn stable  Stay hydrated    Return if symptoms worsen or fail to improve  Subjective:      Patient ID: Lorena Duque is a 59 y o  female  Chief Complaint   Patient presents with   • L ear fullness   • Sinusitis       HPI  Sinus sx  Left ear pain  Clogged on left  About 10d  Uri sx since then  Strep neg last visit  Not improving  Took tessalon  mucinex DM used  Brown nasal dc  Some bloody tinged nasal dc     The following portions of the patient's history were reviewed and updated as appropriate: allergies, current medications, past family history, past medical history, past social history, past surgical history and problem list     Review of Systems   HENT: Positive for sinus pressure  Respiratory: Negative for shortness of breath and wheezing            Current Outpatient Medications   Medication Sig Dispense Refill   • acetaminophen (TYLENOL) 650 mg CR tablet Take 650 mg by mouth every 8 (eight) hours as needed for mild pain     • Alpha-Lipoic Acid 600 MG CAPS Take by mouth in the morning     • amoxicillin-clavulanate (AUGMENTIN) 875-125 mg per tablet Take 1 tablet by mouth 2 (two) times a day for 10 days 20 tablet 0   • Calcium Carbonate-Vitamin D (CALCIUM 600+D HIGH POTENCY PO) Take by mouth daily     • Cholecalciferol (VITAMIN D3) 5000 units CAPS Take by mouth daily     • famotidine (PEPCID) 40 MG tablet Take 1 tablet by mouth daily as needed     • Flaxseed, Linseed, (FLAX SEED OIL) 1000 MG CAPS Take 2 capsules by mouth daily      • ibuprofen (MOTRIN) 200 mg tablet Take by mouth every 6 (six) hours as needed for mild pain      • "losartan-hydrochlorothiazide (HYZAAR) 50-12 5 mg per tablet Take 1 tablet by mouth daily 90 tablet 0     No current facility-administered medications for this visit  Objective:    /70   Pulse 99   Temp 97 6 °F (36 4 °C)   Resp 16   Ht 5' 1\" (1 549 m)   Wt 88 kg (194 lb)   LMP  (LMP Unknown)   SpO2 96%   BMI 36 66 kg/m²        Physical Exam  Vitals and nursing note reviewed  Constitutional:       General: She is not in acute distress  Appearance: She is well-developed  She is obese  She is not ill-appearing  HENT:      Head: Normocephalic  Right Ear: Tympanic membrane normal       Left Ear: Tympanic membrane normal       Nose: Congestion present  Comments: Sinuses tender to percussion, nasal turbinates visualized and appear red and swollen    Eyes:      General: No scleral icterus  Conjunctiva/sclera: Conjunctivae normal    Cardiovascular:      Rate and Rhythm: Normal rate and regular rhythm  Heart sounds: No murmur heard  Pulmonary:      Effort: Pulmonary effort is normal  No respiratory distress  Breath sounds: No wheezing  Abdominal:      Palpations: Abdomen is soft  Musculoskeletal:         General: No deformity  Cervical back: Neck supple  Skin:     General: Skin is warm and dry  Coloration: Skin is not pale  Neurological:      Mental Status: She is alert  Psychiatric:         Behavior: Behavior normal          Thought Content:  Thought content normal                 Venson Slipper, DO    "

## 2023-04-05 ENCOUNTER — HOSPITAL ENCOUNTER (OUTPATIENT)
Dept: RADIOLOGY | Age: 58
Discharge: HOME | End: 2023-04-05

## 2023-04-05 VITALS — BODY MASS INDEX: 36.63 KG/M2 | WEIGHT: 194 LBS | HEIGHT: 61 IN

## 2023-04-05 DIAGNOSIS — Z12.31 ENCOUNTER FOR SCREENING MAMMOGRAM FOR MALIGNANT NEOPLASM OF BREAST: ICD-10-CM

## 2023-04-28 DIAGNOSIS — I10 ESSENTIAL HYPERTENSION: ICD-10-CM

## 2023-04-28 RX ORDER — LOSARTAN POTASSIUM AND HYDROCHLOROTHIAZIDE 12.5; 5 MG/1; MG/1
1 TABLET ORAL DAILY
Qty: 90 TABLET | Refills: 0 | Status: SHIPPED | OUTPATIENT
Start: 2023-04-28

## 2023-05-31 PROBLEM — J01.00 ACUTE NON-RECURRENT MAXILLARY SINUSITIS: Status: RESOLVED | Noted: 2023-04-01 | Resolved: 2023-05-31

## 2023-06-24 ENCOUNTER — APPOINTMENT (OUTPATIENT)
Dept: RADIOLOGY | Age: 58
End: 2023-06-24
Payer: COMMERCIAL

## 2023-06-24 ENCOUNTER — OFFICE VISIT (OUTPATIENT)
Dept: URGENT CARE | Age: 58
End: 2023-06-24
Payer: COMMERCIAL

## 2023-06-24 VITALS
TEMPERATURE: 98.1 F | SYSTOLIC BLOOD PRESSURE: 128 MMHG | HEART RATE: 80 BPM | OXYGEN SATURATION: 98 % | RESPIRATION RATE: 12 BRPM | DIASTOLIC BLOOD PRESSURE: 80 MMHG

## 2023-06-24 DIAGNOSIS — M79.631 RIGHT FOREARM PAIN: ICD-10-CM

## 2023-06-24 DIAGNOSIS — M79.631 RIGHT FOREARM PAIN: Primary | ICD-10-CM

## 2023-06-24 DIAGNOSIS — S50.11XA CONTUSION OF RIGHT FOREARM, INITIAL ENCOUNTER: ICD-10-CM

## 2023-06-24 PROCEDURE — 99213 OFFICE O/P EST LOW 20 MIN: CPT

## 2023-06-24 PROCEDURE — 73090 X-RAY EXAM OF FOREARM: CPT
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
## 2023-06-25 NOTE — PATIENT INSTRUCTIONS
X-rays reviewed, no acute abnormality noted, awaiting official read  Rest, ice, compress and elevate for pain and swelling  May alternate Tylenol and Ibuprofen for pain  Follow up with PCP if no significant improvement within one week

## 2023-08-08 DIAGNOSIS — I10 ESSENTIAL HYPERTENSION: ICD-10-CM

## 2023-08-08 RX ORDER — LOSARTAN POTASSIUM AND HYDROCHLOROTHIAZIDE 12.5; 5 MG/1; MG/1
1 TABLET ORAL DAILY
Qty: 90 TABLET | Refills: 0 | Status: SHIPPED | OUTPATIENT
Start: 2023-08-08

## 2023-09-14 ENCOUNTER — OFFICE VISIT (OUTPATIENT)
Dept: URGENT CARE | Age: 58
End: 2023-09-14
Payer: COMMERCIAL

## 2023-09-14 VITALS
DIASTOLIC BLOOD PRESSURE: 96 MMHG | RESPIRATION RATE: 18 BRPM | TEMPERATURE: 97.7 F | HEART RATE: 92 BPM | OXYGEN SATURATION: 97 % | SYSTOLIC BLOOD PRESSURE: 149 MMHG

## 2023-09-14 DIAGNOSIS — R05.1 ACUTE COUGH: Primary | ICD-10-CM

## 2023-09-14 PROCEDURE — 99213 OFFICE O/P EST LOW 20 MIN: CPT | Performed by: NURSE PRACTITIONER

## 2023-09-14 RX ORDER — BENZONATATE 200 MG/1
200 CAPSULE ORAL 3 TIMES DAILY PRN
Qty: 20 CAPSULE | Refills: 0 | Status: SHIPPED | OUTPATIENT
Start: 2023-09-14

## 2023-09-14 NOTE — PROGRESS NOTES
[facility]        NAME: Mandy Jo is a 59 y.o. female  : 1965    MRN: [id number]  DATE: 2023  TIME: 4:18 PM    Assessment and Plan   Acute cough [R05.1]  1. Acute cough  benzonatate (TESSALON) 200 MG capsule            Patient Instructions     Take med OTC prn  Consider Claritin 10 mg daily, OTC  Follow up with PCP in 3-5 days. Proceed to  ER if symptoms worsen. Chief Complaint     Chief Complaint   Patient presents with   • Cough     Cough and chest hurt from coughing wince Saturday. History of Present Illness       HPI   To clinic with complaint of cough for about a week. States she has been coughing a lot and sometimes she feels pain in her chest from the coughing. Had sneezing for a few days but that seems to be going away. Also nasal congestion. Review of Systems   Review of Systems   Constitutional: Negative for chills and fever. HENT: Positive for congestion, rhinorrhea and sneezing. Negative for sore throat. Respiratory: Positive for cough. Negative for chest tightness, shortness of breath and wheezing. Cardiovascular: Negative for chest pain. Gastrointestinal: Negative for diarrhea and vomiting. Neurological: Negative for light-headedness.          Current Medications       Current Outpatient Medications:   •  benzonatate (TESSALON) 200 MG capsule, Take 1 capsule (200 mg total) by mouth 3 (three) times a day as needed for cough, Disp: 20 capsule, Rfl: 0  •  acetaminophen (TYLENOL) 650 mg CR tablet, Take 650 mg by mouth every 8 (eight) hours as needed for mild pain, Disp: , Rfl:   •  Alpha-Lipoic Acid 600 MG CAPS, Take by mouth in the morning, Disp: , Rfl:   •  Calcium Carbonate-Vitamin D (CALCIUM 600+D HIGH POTENCY PO), Take by mouth daily, Disp: , Rfl:   •  Cholecalciferol (VITAMIN D3) 5000 units CAPS, Take by mouth daily, Disp: , Rfl:   •  famotidine (PEPCID) 40 MG tablet, Take 1 tablet by mouth daily as needed, Disp: , Rfl:   •  Flaxseed, Linseed, (FLAX SEED OIL) 1000 MG CAPS, Take 2 capsules by mouth daily , Disp: , Rfl:   •  ibuprofen (MOTRIN) 200 mg tablet, Take by mouth every 6 (six) hours as needed for mild pain , Disp: , Rfl:   •  losartan-hydrochlorothiazide (HYZAAR) 50-12.5 mg per tablet, Take 1 tablet by mouth daily, Disp: 90 tablet, Rfl: 0    Current Allergies     Allergies as of 09/14/2023 - Reviewed 09/14/2023   Allergen Reaction Noted   • Banana - food allergy GI Intolerance 09/14/2020   • Statins Arthralgia 03/31/2021   • Fish oil [omega-3 fatty acids] Myalgia 08/31/2022   • Zetia [ezetimibe] Myalgia 08/31/2022            The following portions of the patient's history were reviewed and updated as appropriate: allergies, current medications, past family history, past medical history, past social history, past surgical history and problem list.     Past Medical History:   Diagnosis Date   • Abscess of back     Resolved 1/11/2018    • Adverse reaction to statin medication     Resolved 1/11/2018    • Alcoholism ([address])     AGE 26   • Anesthesia complication     extreme dizziness upon awakening   • Anxiety    • BRCA1 negative     thru 23&Me   • Chronic pain disorder      rt side pinched nerve   • Colon polyp    • Depression    • Edema    • GERD (gastroesophageal reflux disease)    • Hyperlipidemia    • Hypertension    • Irregular heart beat     palpitations   • Irritable bowel syndrome    • Liver disease     fatty   • Neck pain    • Pinched nerve     Last assessed 9/26/2017    • PONV (postoperative nausea and vomiting)    • Tuberculosis     1986       Past Surgical History:   Procedure Laterality Date   • CARDIAC CATHETERIZATION      due to brothers advanced heart disease and abnormal stress test   • COLONOSCOPY N/A 12/6/2017    Procedure: COLONOSCOPY;  Surgeon: Logan Andrade MD;  Location: [address] [place] [address] GI LAB;   Service: Gastroenterology   • COLONOSCOPY  1986   • ELBOW SURGERY Right     tendon repair, post op nausea and dizziness   • ESOPHAGOGASTRODUODENOSCOPY N/A 12/6/2017    Procedure: ESOPHAGOGASTRODUODENOSCOPY (EGD); Surgeon: Jonah Green MD;  Location[facility]; Service: Gastroenterology   • ESOPHAGOGASTRODUODENOSCOPY  1986    Diagnostic    • SKIN BIOPSY      Last assessed 9/28/2017    • UPPER GASTROINTESTINAL ENDOSCOPY     • WISDOM TOOTH EXTRACTION         Family History   Problem Relation Age of Onset   • Alzheimer's disease Mother    • Thyroid disease Mother    • Edema Mother    • Osteoarthritis Mother         Knee   • Stroke Father    • Hypertension Father    • Hyperlipidemia Father    • Arthritis Father    • Coronary artery disease Father    • Hypothyroidism Father    • Other Father         status post coronary artery bypass graft    • Stroke Sister    • Heart failure Sister    • Heart disease Sister    • Drug abuse Brother    • Completed Suicide  Brother    • No Known Problems Maternal Grandmother    • No Known Problems Maternal Grandfather    • No Known Problems Paternal Grandmother    • No Known Problems Paternal Grandfather    • Breast cancer Paternal Aunt 46   • Colon cancer Maternal Uncle 75   • No Known Problems Maternal Aunt    • No Known Problems Maternal Aunt          Medications have been verified. Objective   /96   Pulse 92   Temp 97.7 °F (36.5 °C) (Temporal)   Resp 18   LMP  (LMP Unknown)   SpO2 97%   No LMP recorded (lmp unknown). Patient is postmenopausal.       Physical Exam     Physical Exam  HENT:      Head:      Comments: Palpation of the sinuses did not elicit any tenderness     Right Ear: Tympanic membrane normal.      Left Ear: Tympanic membrane normal.      Nose: Rhinorrhea present. Mouth/Throat:      Comments: Post nasal drip  Cardiovascular:      Rate and Rhythm: Regular rhythm. Heart sounds: Normal heart sounds. Pulmonary:      Effort: Pulmonary effort is normal.      Breath sounds: Normal breath sounds.

## 2023-09-29 ENCOUNTER — NEW PATIENT (OUTPATIENT)
Dept: URBAN - METROPOLITAN AREA CLINIC 6 | Facility: CLINIC | Age: 58
End: 2023-09-29

## 2023-09-29 DIAGNOSIS — H25.813: ICD-10-CM

## 2023-09-29 DIAGNOSIS — H40.023: ICD-10-CM

## 2023-09-29 PROCEDURE — 92202 OPSCPY EXTND ON/MAC DRAW: CPT

## 2023-09-29 PROCEDURE — 92020 GONIOSCOPY: CPT

## 2023-09-29 PROCEDURE — 92133 CPTRZD OPH DX IMG PST SGM ON: CPT

## 2023-09-29 PROCEDURE — 92004 COMPRE OPH EXAM NEW PT 1/>: CPT

## 2023-09-29 PROCEDURE — 76514 ECHO EXAM OF EYE THICKNESS: CPT

## 2023-09-29 ASSESSMENT — VISUAL ACUITY
OD_PH: 20/25
OS_SC: 20/25+1
OD_SC: 20/50

## 2023-09-29 ASSESSMENT — PACHYMETRY
OS_CT_UM: 515
OD_CT_UM: 515

## 2023-09-29 ASSESSMENT — TONOMETRY
OS_IOP_MMHG: 21
OD_IOP_MMHG: 16

## 2023-10-24 ENCOUNTER — OFFICE VISIT (OUTPATIENT)
Dept: OBGYN CLINIC | Facility: OTHER | Age: 58
End: 2023-10-24
Payer: COMMERCIAL

## 2023-10-24 VITALS
HEART RATE: 85 BPM | SYSTOLIC BLOOD PRESSURE: 124 MMHG | BODY MASS INDEX: 36.32 KG/M2 | WEIGHT: 185 LBS | HEIGHT: 60 IN | DIASTOLIC BLOOD PRESSURE: 90 MMHG

## 2023-10-24 DIAGNOSIS — M22.2X1 PATELLOFEMORAL PAIN SYNDROME OF BOTH KNEES: Primary | ICD-10-CM

## 2023-10-24 DIAGNOSIS — M22.2X2 PATELLOFEMORAL PAIN SYNDROME OF BOTH KNEES: Primary | ICD-10-CM

## 2023-10-24 PROCEDURE — 99214 OFFICE O/P EST MOD 30 MIN: CPT | Performed by: ORTHOPAEDIC SURGERY

## 2023-10-24 RX ORDER — LIDOCAINE 50 MG/G
OINTMENT TOPICAL 2 TIMES DAILY PRN
Qty: 50 G | Refills: 1 | Status: SHIPPED | OUTPATIENT
Start: 2023-10-24

## 2023-10-24 NOTE — PROGRESS NOTES
Chief Complaint: Bilateral knee pain    HPI:    Arlen Cabot is a 59year old Female who presents today for evaluation of bilateral knee pain      Description of symptoms: Nontraumatic onset intermittent bilateral anterior knee pain of several weeks duration. Currently symptoms are only mild in intensity. Made worse with knee flexion or forward lunges during exercise. No associated knee instability or locking episodes.           Patient Active Problem List   Diagnosis    Adverse reaction to statin medication    Arthritis    Asthma    Colon polyps    GERD (gastroesophageal reflux disease)    Hyperlipemia, mixed    Essential hypertension    IBS (irritable bowel syndrome)    Nodule of apex of right lung    TB lung fibrosis, exam unkn    Class 1 obesity due to excess calories with serious comorbidity and body mass index (BMI) of 34.0 to 34.9 in adult    Spinal stenosis in cervical region    Former consumption of alcohol    Dense breast    Cervical radiculopathy    DDD (degenerative disc disease), cervical    Cervical disc disorder at C5-C6 level with radiculopathy    Chronic pain syndrome    Thoracic spine pain    Hepatic steatosis    History of colon polyps    Lumbar spondylosis    Spinal stenosis of lumbar region    Lumbar radiculopathy    Chronic lower back pain    Acute flank pain        Current Outpatient Medications on File Prior to Visit   Medication Sig Dispense Refill    acetaminophen (TYLENOL) 650 mg CR tablet Take 650 mg by mouth every 8 (eight) hours as needed for mild pain      Alpha-Lipoic Acid 600 MG CAPS Take by mouth in the morning      Calcium Carbonate-Vitamin D (CALCIUM 600+D HIGH POTENCY PO) Take by mouth daily      Cholecalciferol (VITAMIN D3) 5000 units CAPS Take by mouth daily      famotidine (PEPCID) 40 MG tablet Take 1 tablet by mouth daily as needed      Flaxseed, Linseed, (FLAX SEED OIL) 1000 MG CAPS Take 2 capsules by mouth daily       ibuprofen (MOTRIN) 200 mg tablet Take by mouth every 6 (six) hours as needed for mild pain       losartan-hydrochlorothiazide (HYZAAR) 50-12.5 mg per tablet Take 1 tablet by mouth daily 90 tablet 0    benzonatate (TESSALON) 200 MG capsule Take 1 capsule (200 mg total) by mouth 3 (three) times a day as needed for cough (Patient not taking: Reported on 10/24/2023) 20 capsule 0     No current facility-administered medications on file prior to visit. Allergies   Allergen Reactions    Banana - Food Allergy GI Intolerance    Statins Arthralgia    Fish Oil [Omega-3 Fatty Acids] Myalgia     Lovaza    Zetia [Ezetimibe] Myalgia        Tobacco Use: Medium Risk (9/14/2023)    Patient History     Smoking Tobacco Use: Former     Smokeless Tobacco Use: Never     Passive Exposure: Not on file        Social Determinants of Health     Tobacco Use: Medium Risk (9/14/2023)    Patient History     Smoking Tobacco Use: Former     Smokeless Tobacco Use: Never     Passive Exposure: Not on file   Alcohol Use: Not on file   Financial Resource Strain: Not on file   Food Insecurity: Not on file   Transportation Needs: Not on file   Physical Activity: Not on file   Stress: Not on file   Social Connections: Not on file   Intimate Partner Violence: Not on file   Depression: Not at risk (9/14/2023)    PHQ-2     PHQ-2 Score: 0   Housing Stability: Not on file   Utilities: Not on file               Review of Systems     Body mass index is 35.83 kg/m². Physical Exam  Vitals and nursing note reviewed. HENT:      Head: Atraumatic. Eyes:      Conjunctiva/sclera: Conjunctivae normal.   Cardiovascular:      Rate and Rhythm: Normal rate. Pulses: Normal pulses. Pulmonary:      Effort: Pulmonary effort is normal. No respiratory distress. Neurological:      Mental Status: She is alert and oriented to person, place, and time. Psychiatric:         Mood and Affect: Mood normal.         Behavior: Behavior normal.          Ortho Exam:    Body part: right knee    Inspection: No effusion.   No erythema. No deformity. Palpation:    Normal crepitus and tenderness. Mild medial joint line tenderness    Range of motion: Full range of right knee motion with discomfort in terminal flexion    Special Tests: Negative valgus and varus stress test.  No patellar apprehension. Negative medial and lateral Neel's. Negative Lachman. Distal Neurovascular Status: Intact, Yes    Body part: left knee    Inspection: No visible deformity. No effusion. No erythema. Palpation: Palpable patellofemoral crepitus and medial joint line tenderness. Range of motion: Full range of left knee motion with discomfort in terminal flexion. Special Tests: Increased discomfort with patellofemoral compression. No patellar apprehension. Negative valgus and varus stress test.  Negative Lachman. Negative medial and lateral Neel's. Distal Neurovascular Status: Intact, Yes    Procedures       Assessment:    No diagnosis found. Plan:    Explained my current clinical findings to the patient today. Her symptoms are likely secondary to bilateral patellofemoral syndrome/arthritis. Currently since her symptoms are mild we participated in shared decision making to defer radiological imaging. I will prescribe her topical lidocaine ointment for symptomatic relief as needed. We will also refer her for course of physical therapy to assist with quadriceps strengthening and core strengthening exercises. Follow-Up:    3 months        Portions of the record may have been created with voice recognition software. Occasional wrong word or "sound alike" substitutions may have occurred due to the inherent limitations of voice recognition software. Please review the chart carefully and recognize, using context, where substitutions/typographical errors may have occurred.

## 2023-10-30 ENCOUNTER — EVALUATION (OUTPATIENT)
Dept: PHYSICAL THERAPY | Facility: OTHER | Age: 58
End: 2023-10-30
Payer: COMMERCIAL

## 2023-10-30 DIAGNOSIS — M22.2X1 PATELLOFEMORAL PAIN SYNDROME OF BOTH KNEES: ICD-10-CM

## 2023-10-30 DIAGNOSIS — M22.2X2 PATELLOFEMORAL PAIN SYNDROME OF BOTH KNEES: ICD-10-CM

## 2023-10-30 PROCEDURE — 97112 NEUROMUSCULAR REEDUCATION: CPT

## 2023-10-30 PROCEDURE — 97110 THERAPEUTIC EXERCISES: CPT

## 2023-10-30 PROCEDURE — 97162 PT EVAL MOD COMPLEX 30 MIN: CPT

## 2023-10-30 NOTE — PROGRESS NOTES
PT Evaluation     Today's date: 10/30/2023  Patient name: Feli Valadez  : 1965  MRN: [id number]  Referring provider: Sharla Reed MD  Dx:   Encounter Diagnosis     ICD-10-CM    1. Patellofemoral pain syndrome of both knees  M22.2X1 Ambulatory Referral to Physical Therapy    M22.2X2           Start Time: 1732  Stop Time: 1840  Total time in clinic (min): 68 minutes    Assessment  Assessment details: Problem List:  1) Hip/Core/LE motor control deficits  2) Hip/Core/LE weakness    Feli Valadez is a pleasant 59 y.o. female who presents with bilateral knee pain that has been on and off over the past year. She has general hip/core/LE motor control deficits/weakness (L>R), nociceptive pain type majority, and diagnosis of patellofemoral pain syndrome resulting in the pain she is experiencing, fear of not being able to keep active, and future ill health (and wanting to prevent it). No further referral appears necessary at this time based upon examination results. I expect she will improve in 8-10 weeks. Positive prognostic indicators include positive attitude toward recovery, good understanding of diagnosis and treatment plan options, absence of peripheralization, and absence of observed red flags. Negative prognostic indicators include chronicity of symptoms, anxiety, depression, hypertension, multiple concurrent orthopedic problems, and obesity. Patient was provided with a customized home exercise program to begin performing on their own. All patient questions and concerns have been addressed at this time.  Thank you for the kind referral.    Comparable signs:  1) Pain with forward step down (L LE)  Impairments: abnormal coordination, abnormal muscle firing, abnormal muscle tone, abnormal or restricted ROM, abnormal movement, activity intolerance, impaired physical strength, lacks appropriate home exercise program, pain with function and weight-bearing intolerance    Symptom irritability: moderateUnderstanding of Dx/Px/POC: good   Prognosis: fair    Goals  Short Term Goals:   1. Patient will be independent with a customized HEP. 2. Patient will report at least 40% improvement overall with performance of ADL's (stair negotiation). 3. Patient will demonstrate half grade improvement or better of MMT of B/L hip musculature (flexion/abduction/extension). Long Term Goals:   1. Patient will improve FOTO by 10 points or better. 2. Patient will improve pain with activity to 2/10 or less. 3. Patient will continue with HEP independence to allow for decreased future reoccurrence of pain and loss in function. 4. Patient will report at least 80% improvement overall with performance of ADL's (stair negotiation). 5. Patient will be able to participate in recreational activities (martial arts) with minimal to no limitations. Plan  Plan details: Prognosis above is given PT services 1x/week over the next 2-3 months and home program adherence. Patient would benefit from: PT eval and skilled physical therapy  Planned modality interventions: cryotherapy, TENS, thermotherapy: hydrocollator packs and low level laser therapy  Planned therapy interventions: manual therapy, massage, joint mobilization, coordination, graded exercise, graded activity, functional ROM exercises, therapeutic exercise, therapeutic activities, patient education, neuromuscular re-education, home exercise program and flexibility  Frequency: 1x week  Duration in visits: 12  Duration in weeks: 12  Plan of Care beginning date: 10/30/2023  Plan of Care expiration date: 1/22/2024  Treatment plan discussed with: patient        Subjective Evaluation    History of Present Illness  Mechanism of injury: Patient is a 59 y.o. female presenting to physical therapy with chief complaint of bilateral knee pain - with her left knee being more painful than her right.  Patient reports this episode of pain/symptoms has been going on for the past year with overall slight improvement. Patient reports no clear mechanism of injury, however does report recent inactivity over the past 1-2 months which she feels contributed to her getting weaker and leading to her pain. Quality of life: fair    Patient Goals  Patient goals for therapy: independence with ADLs/IADLs, decreased pain, increased strength and return to sport/leisure activities  Patient goal: "I want to be able to do stairs and martial arts without pain."  Pain  Current pain ratin  At best pain ratin  At worst pain ratin  Quality: sharp  Relieving factors: rest  Aggravating factors: running and lifting (Descending Stairs)    Social Support    Employment status: working ( for [employer])  Exercise history: Martial Arts, Kickboxing, Aerobics          Objective      Palpation: Mild TTP inferior left knee    Myotomes (L/R): Intact B/L LE  Dermatome: (pinprick- L/R): Intact B/L LE        Reflexes:  (L/R) L3-4: 1+ B/L      S1: 1+ B/L            GAIT: WNL  Squat assess: Fair hip hinge, mild valgus collapse  Singe Leg Stance: NT    Meniscal Tests:  Catching/Clicking/Locking no  Joint Line Tenderness: no        MMT         AROM         PROM    Hip       L     R         L       R          L         R   Flex. 4 4 WFL WFL     Extn. 3 3+ WFL WFL     Abd. 3 3+ WFL WFL     Add. IR.         ER.         G. Max         G. Med         Iliop. .         Knee         Extension 4+ 4+ [facility] WFL     Flexion 5 5 WFL WFL              Ankle         Dorsi Flexion 5 5       Plantar Flexion           Special Test:  90/90 Test: (-) B/L          Lateral Step Down Test: L Knee Pain         Neuro Dynamic Testing:  Straight leg raise:   L= (+) however not reproduction of pain     R=  -        Segmental mobility:   Patella: WNL B/L          Precautions: Positional Vertigo, Anxiety/Panic Disorders, Arthritis, Asthma, BMI > 30, Depression, HTN. Access Code: [email or web address]  URL: [email or web address]  Date: 10/30/2023  Prepared by: Gilford Calk    Exercises  - Single Leg Bridge  - 1 x daily - 7 x weekly - 3 sets - 8 reps - 3 seconds hold  - Clamshell with Resistance  - 1 x daily - 7 x weekly - 3 sets - 12 reps  - Side Stepping with Resistance at Feet  - 1 x daily - 7 x weekly - 3 sets - 12 steps  - Forward Step Down Touch with Heel  - 1 x daily - 7 x weekly - 2 sets - 10 reps  - Kettlebell Squat  - 1 x daily - 7 x weekly - 3 sets - 12 reps  - Supine Sciatic Nerve Glide  - 1 x daily - 7 x weekly - 3 sets - 10 reps    POC expires Unit limit Auth Expiration date PT/OT + Visit Limit?    1/22/24 N/A N/A BOMN                               Visit 1 IE        Manuals 10/30/23        Knee PROM         Soft Tissue Deformation                           Neuro Re-Ed         Sciatic nerve glide supine 2x10 HEP        Glute Bridge SL 2x5 HEP        Clamshell BTB 2x10 HEP                          Ther Ex         Bike         Leg Press         Side Stepping BTB feet 3 laps half turf HEP                                            Ther Activity         Step Ups         FSD 2x5 pain free range         LSU         Squatting KB 10# 2x10        Gait Training                           Modalities

## 2023-11-14 ENCOUNTER — OFFICE VISIT (OUTPATIENT)
Dept: PHYSICAL THERAPY | Facility: OTHER | Age: 58
End: 2023-11-14
Payer: COMMERCIAL

## 2023-11-14 DIAGNOSIS — M22.2X1 PATELLOFEMORAL PAIN SYNDROME OF BOTH KNEES: Primary | ICD-10-CM

## 2023-11-14 DIAGNOSIS — M22.2X2 PATELLOFEMORAL PAIN SYNDROME OF BOTH KNEES: Primary | ICD-10-CM

## 2023-11-14 PROCEDURE — 97140 MANUAL THERAPY 1/> REGIONS: CPT

## 2023-11-14 PROCEDURE — 97110 THERAPEUTIC EXERCISES: CPT

## 2023-11-14 PROCEDURE — 97112 NEUROMUSCULAR REEDUCATION: CPT

## 2023-11-14 NOTE — PROGRESS NOTES
Daily Note     Today's date: 2023  Patient name: Mandy Jo  : 1965  MRN: [id number]  Referring provider: Marcus Melissa  Dx:   Encounter Diagnosis     ICD-10-CM    1. Patellofemoral pain syndrome of both knees  M22.2X1     M22.2X2           Start Time: 1140  Stop Time: 1235  Total time in clinic (min): 55 minutes  [facility] PT 1 on 1 from [id number] (48 minutes total)      Subjective: Patient reports continued discomfort of her knees but does report not being as compliant with her HEP due to being busy and working long hours during the week. Objective: See treatment diary below      Assessment: Tolerated treatment well. Patient hip external rotation improved following hip opener lateral lunge stretch. Difficulty with quadriceps eccentric control of her L LE persists. Educated patient on importance of strict HEP compliance for improvement of symptoms. Patient demonstrated fatigue post treatment, exhibited good technique with therapeutic exercises, and would benefit from continued PT. Plan: Continue per plan of care. Precautions: Positional Vertigo, Anxiety/Panic Disorders, Arthritis, Asthma, BMI > 30, Depression, HTN. Access Code: [email or web address]  URL: [email or web address]  Date: 10/30/2023  Prepared by: Shannan Baumgarten    Exercises  - Single Leg Bridge  - 1 x daily - 7 x weekly - 3 sets - 8 reps - 3 seconds hold  - Clamshell with Resistance  - 1 x daily - 7 x weekly - 3 sets - 12 reps  - Side Stepping with Resistance at Feet  - 1 x daily - 7 x weekly - 3 sets - 12 steps  - Forward Step Down Touch with Heel  - 1 x daily - 7 x weekly - 2 sets - 10 reps  - Kettlebell Squat  - 1 x daily - 7 x weekly - 3 sets - 12 reps  - Supine Sciatic Nerve Glide  - 1 x daily - 7 x weekly - 3 sets - 10 reps    POC expires Unit limit Auth Expiration date PT/OT + Visit Limit?    24 N/A N/A BOMN                               Visit 1 IE 2       Manuals 10/30/23 11/14/23       Knee PROM Soft Tissue Deformation                  Assessment  GJL        Neuro Re-Ed         Sciatic nerve glide supine 2x10 HEP        Glute Bridge SL 2x5 HEP Rev HEP       Clamshell BTB 2x10 HEP Rev HEP       Stand Hip Abd/Ext  OTB 2x10 ea       Glute med iso wall  3x15" ea                                  Ther Ex         Bike  5'       Leg Press         Side Stepping BTB feet 3 laps half turf HEP Rev HEP       Lateral lunge opener stretch  10# KB 10x10" ea                                  Ther Activity         Step Ups         FSD 2x5 pain free range         LSU  Discomfort/ difficulty with eccentric        Squatting KB 10# 2x10 Form/ assessment       Gait Training                           Modalities

## 2023-11-17 ENCOUNTER — OFFICE VISIT (OUTPATIENT)
Age: 58
End: 2023-11-17
Payer: COMMERCIAL

## 2023-11-17 VITALS
WEIGHT: 185 LBS | HEART RATE: 81 BPM | TEMPERATURE: 98.4 F | DIASTOLIC BLOOD PRESSURE: 76 MMHG | SYSTOLIC BLOOD PRESSURE: 120 MMHG | HEIGHT: 60 IN | BODY MASS INDEX: 36.32 KG/M2 | OXYGEN SATURATION: 98 %

## 2023-11-17 DIAGNOSIS — Z13.228 SCREENING FOR METABOLIC DISORDER: Primary | ICD-10-CM

## 2023-11-17 DIAGNOSIS — E55.9 VITAMIN D DEFICIENCY: ICD-10-CM

## 2023-11-17 DIAGNOSIS — Z78.0 POST-MENOPAUSAL: ICD-10-CM

## 2023-11-17 DIAGNOSIS — Z23 ENCOUNTER FOR IMMUNIZATION: ICD-10-CM

## 2023-11-17 DIAGNOSIS — Z00.00 ANNUAL PHYSICAL EXAM: ICD-10-CM

## 2023-11-17 DIAGNOSIS — R73.01 ELEVATED FASTING GLUCOSE: ICD-10-CM

## 2023-11-17 DIAGNOSIS — I10 ESSENTIAL HYPERTENSION: ICD-10-CM

## 2023-11-17 PROCEDURE — 90471 IMMUNIZATION ADMIN: CPT

## 2023-11-17 PROCEDURE — 90750 HZV VACC RECOMBINANT IM: CPT

## 2023-11-17 PROCEDURE — 99396 PREV VISIT EST AGE 40-64: CPT | Performed by: NURSE PRACTITIONER

## 2023-11-17 RX ORDER — LOSARTAN POTASSIUM AND HYDROCHLOROTHIAZIDE 12.5; 5 MG/1; MG/1
1 TABLET ORAL DAILY
Qty: 90 TABLET | Refills: 1 | Status: SHIPPED | OUTPATIENT
Start: 2023-11-17

## 2023-11-17 NOTE — ASSESSMENT & PLAN NOTE
--due for fasting blood work   -Continue with well-balanced diet, encouraged daily exercise  -She is up-to-date with mammogram/colonoscopy and due for cervical cancer screening  -She is up-to-date with vaccinations  -Encourage monthly self breast examination  -Follow-up in 1 year for annual physical or sooner if needed

## 2023-11-17 NOTE — PROGRESS NOTES
[facility] - [id number] [facility] Adult Female Physical Visit  Patient ID: Dylan Hawley    : 1965  Age/Gender: 59 y.o. female     DATE: 2023      Assessment/Plan:    Annual physical exam  --due for fasting blood work   -Continue with well-balanced diet, encouraged daily exercise  -She is up-to-date with mammogram/colonoscopy and due for cervical cancer screening  -She is up-to-date with vaccinations  -Encourage monthly self breast examination  -Follow-up in 1 year for annual physical or sooner if needed       Diagnoses and all orders for this visit:    Screening for metabolic disorder  -     CBC and differential  -     Comprehensive metabolic panel; Future  -     Lipid panel  -     Hemoglobin A1C    Essential hypertension  -     losartan-hydrochlorothiazide (HYZAAR) 50-12.5 mg per tablet; Take 1 tablet by mouth daily  -     CBC and differential  -     Comprehensive metabolic panel; Future  -     Lipid panel  -     Hemoglobin A1C    Elevated fasting glucose  -     CBC and differential  -     Comprehensive metabolic panel; Future  -     Lipid panel  -     Hemoglobin A1C    Vitamin D deficiency  -     Vitamin D 25 hydroxy    Post-menopausal  -     DXA bone density spine hip and pelvis;  Future          Subjective:     Dylan Hawley is a 59 y.o. female who presents to the office on 2023 for a health maintenance physical.    HPI  The following portions of the patient's history were reviewed and updated as appropriate: allergies, current medications, past family history, past medical history, past social history, past surgical history, and problem list.    Pt reports overall health:  good, has seasonal depression   Last Physical:   Last GYN Exam: has been a well     Healthy Diet:  well balanced, "could use some improvement"  Routine Exercise:  was walking but not currently   Weight Concerns:  would like to lose weight    Problems with vision: denies new concerns, wears readers and driving glasses  Last Eye Exam:  2022 Sept     Problems with Hearing:  denies     Routine Dental Exams:  every 6 months    Smoking History: former, Quit in 78  ETOH Use:  denies, sober 33 years, follows AA  Illegal Drug Use: denies  Caffeine Use: 3 cups a day     Reproductive Health:    Last PAP:  2018  History of abnormal PAP:  denies   Sexually Active:  not currently  Post menopausal   Vaginal Discharge:  denies     Last Mammo: April 2024  History of abnormal Mammo: denies      Depression Screening:  PHQ-2/9 Depression Screening               Last Colonoscopy:  2021      Last Labs: 2022    Review of Systems   Constitutional:  Negative for activity change, appetite change, chills, diaphoresis and fever. HENT:  Negative for congestion, ear discharge, ear pain, postnasal drip, rhinorrhea, sinus pressure, sinus pain and sore throat. Eyes:  Negative for pain, discharge, itching and visual disturbance. Respiratory:  Negative for cough, chest tightness, shortness of breath and wheezing. Cardiovascular:  Negative for chest pain, palpitations and leg swelling. Gastrointestinal:  Negative for abdominal pain, constipation, diarrhea, nausea and vomiting. Endocrine: Negative for polydipsia, polyphagia and polyuria. Genitourinary:  Negative for difficulty urinating, dysuria and urgency. Musculoskeletal:  Negative for arthralgias, back pain and neck pain. Skin:  Negative for rash and wound. Neurological:  Negative for dizziness, weakness, numbness and headaches.          Patient Active Problem List   Diagnosis    Adverse reaction to statin medication    Arthritis    Asthma    Colon polyps    GERD (gastroesophageal reflux disease)    Hyperlipemia, mixed    Essential hypertension    IBS (irritable bowel syndrome)    Nodule of apex of right lung    TB lung fibrosis, exam unkn    Class 1 obesity due to excess calories with serious comorbidity and body mass index (BMI) of 34.0 to 34.9 in adult    Spinal stenosis in cervical region    Former consumption of alcohol    Dense breast    Cervical radiculopathy    DDD (degenerative disc disease), cervical    Cervical disc disorder at C5-C6 level with radiculopathy    Chronic pain syndrome    Thoracic spine pain    Hepatic steatosis    History of colon polyps    Lumbar spondylosis    Spinal stenosis of lumbar region    Lumbar radiculopathy    Chronic lower back pain    Acute flank pain    Annual physical exam       Past Medical History:   Diagnosis Date    Abscess of back     Resolved 1/11/2018     Adverse reaction to statin medication     Resolved 1/11/2018     Alcoholism ([address])     AGE 26    Anesthesia complication     extreme dizziness upon awakening    Anxiety     BRCA1 negative     thru 23&Me    Chronic pain disorder      rt side pinched nerve    Colon polyp     Depression     Edema     GERD (gastroesophageal reflux disease)     Glaucoma suspect 09/28/2023    Hyperlipidemia     Hypertension     Irregular heart beat     palpitations    Irritable bowel syndrome     Liver disease     fatty    Neck pain     Pinched nerve     Last assessed 9/26/2017     PONV (postoperative nausea and vomiting)     Tuberculosis     1986       Past Surgical History:   Procedure Laterality Date    CARDIAC CATHETERIZATION      due to brothers advanced heart disease and abnormal stress test    COLONOSCOPY N/A 12/6/2017    Procedure: COLONOSCOPY;  Surgeon: Yovana James MD;  Location: [address] [place] [address] GI LAB; Service: Gastroenterology    COLONOSCOPY  1986    ELBOW SURGERY Right     tendon repair, post op nausea and dizziness    ESOPHAGOGASTRODUODENOSCOPY N/A 12/6/2017    Procedure: ESOPHAGOGASTRODUODENOSCOPY (EGD); Surgeon: Yovana James MD;  Location: [facility];   Service: Gastroenterology    ESOPHAGOGASTRODUODENOSCOPY  1986    Diagnostic     SKIN BIOPSY      Last assessed 9/28/2017     UPPER GASTROINTESTINAL ENDOSCOPY      WISDOM TOOTH EXTRACTION           Current Outpatient Medications:     acetaminophen (TYLENOL) 650 mg CR tablet, Take 650 mg by mouth every 8 (eight) hours as needed for mild pain, Disp: , Rfl:     Alpha-Lipoic Acid 600 MG CAPS, Take by mouth in the morning, Disp: , Rfl:     Calcium Carbonate-Vitamin D (CALCIUM 600+D HIGH POTENCY PO), Take by mouth daily, Disp: , Rfl:     Cholecalciferol (VITAMIN D3) 5000 units CAPS, Take by mouth daily, Disp: , Rfl:     famotidine (PEPCID) 40 MG tablet, Take 1 tablet by mouth daily as needed, Disp: , Rfl:     Flaxseed, Linseed, (FLAX SEED OIL) 1000 MG CAPS, Take 2 capsules by mouth daily , Disp: , Rfl:     ibuprofen (MOTRIN) 200 mg tablet, Take by mouth every 6 (six) hours as needed for mild pain , Disp: , Rfl:     lidocaine (XYLOCAINE) 5 % ointment, Apply topically 2 (two) times a day as needed for mild pain (for bilateral knee pain), Disp: 50 g, Rfl: 1    losartan-hydrochlorothiazide (HYZAAR) 50-12.5 mg per tablet, Take 1 tablet by mouth daily, Disp: 90 tablet, Rfl: 1    benzonatate (TESSALON) 200 MG capsule, Take 1 capsule (200 mg total) by mouth 3 (three) times a day as needed for cough (Patient not taking: Reported on 10/24/2023), Disp: 20 capsule, Rfl: 0    Allergies   Allergen Reactions    Banana - Food Allergy GI Intolerance    Statins Arthralgia    Fish Oil [Omega-3 Fatty Acids] Myalgia     Lovaza    Zetia [Ezetimibe] Myalgia       Social History     Socioeconomic History    Marital status: Single     Spouse name: None    Number of children: None    Years of education: None    Highest education level: None   Occupational History    None   Tobacco Use    Smoking status: Former     Packs/day: 1.00     Years: 10.00     Total pack years: 10.00     Types: Cigarettes     Start date:      Quit date:      Years since quittin.9    Smokeless tobacco: Never   Vaping Use    Vaping Use: Never used   Substance and Sexual Activity    Alcohol use: No     Comment: 1990    Drug use: Never    Sexual activity: Not Currently   Other Topics Concern    None   Social History Narrative    None     Social Determinants of Health     Financial Resource Strain: Not on file   Food Insecurity: Not on file   Transportation Needs: Not on file   Physical Activity: Not on file   Stress: Not on file   Social Connections: Not on file   Intimate Partner Violence: Not on file   Housing Stability: Not on file       Family History   Problem Relation Age of Onset    Alzheimer's disease Mother     Thyroid disease Mother     Edema Mother     Osteoarthritis Mother         Knee    Stroke Father     Hypertension Father     Hyperlipidemia Father     Arthritis Father     Coronary artery disease Father     Hypothyroidism Father     Other Father         status post coronary artery bypass graft     Stroke Sister     Heart failure Sister     Heart disease Sister     Drug abuse Brother     Completed Suicide  Brother     No Known Problems Maternal Grandmother     No Known Problems Maternal Grandfather     No Known Problems Paternal Grandmother     No Known Problems Paternal Grandfather     Breast cancer Paternal Aunt 48    Colon cancer Maternal Uncle 75    No Known Problems Maternal Aunt     No Known Problems Maternal Aunt        Immunization History   Administered Date(s) Administered    COVID-19 MODERNA VACC 0.5 ML IM 12/30/2020, 01/25/2021, 11/03/2021    COVID-19 Moderna Vac BIVALENT 12 Yr+ IM 0.5 ML 11/13/2022    INFLUENZA 10/05/2019, 10/21/2020, 09/27/2022    Influenza Quadrivalent Preservative Free 3 years and older IM 10/11/2017    Influenza, recombinant, quadrivalent,injectable, preservative free 10/15/2021    Tdap 09/28/2019    Zoster Vaccine Recombinant 04/11/2023        Health Maintenance   Topic Date Due    Pneumococcal Vaccine: Pediatrics (0 to 5 Years) and At-Risk Patients (6 to 59 Years) (1 - PCV) Never done    HIV Screening  Never done    Cervical Cancer Screening  05/21/2021    Influenza Vaccine (1) 09/01/2023    BMI: Followup Plan  04/11/2024 PT PLAN OF CARE  11/29/2023    Breast Cancer Screening: Mammogram  04/05/2024    Depression Screening  09/14/2024    BMI: Adult  10/24/2024    Annual Physical  11/17/2024    Colorectal Cancer Screening  11/16/2026    DTaP,Tdap,and Td Vaccines (2 - Td or Tdap) 09/28/2029    Hepatitis C Screening  Completed    COVID-19 Vaccine  Completed    HIB Vaccine  Aged Out    IPV Vaccine  Aged Out    Hepatitis A Vaccine  Aged Out    Meningococcal ACWY Vaccine  Aged Out    HPV Vaccine  Aged Out         Objective:  Vitals:    11/17/23 1219   BP: 120/76   BP Location: Left arm   Patient Position: Sitting   Cuff Size: Standard   Pulse: 81   Temp: 98.4 °F (36.9 °C)   TempSrc: Temporal   SpO2: 98%   Weight: 83.9 kg (185 lb)   Height: 5' 0.39" (1.534 m)     Wt Readings from Last 3 Encounters:   11/17/23 83.9 kg (185 lb)   10/24/23 83.9 kg (185 lb)   04/11/23 86.5 kg (190 lb 12.8 oz)     Body mass index is 35.66 kg/m². No results found. Physical Exam  Constitutional:       General: She is not in acute distress. Appearance: She is well-developed. She is not diaphoretic. HENT:      Head: Normocephalic and atraumatic. Right Ear: External ear normal.      Left Ear: External ear normal.      Nose: Nose normal.      Mouth/Throat:      Mouth: Mucous membranes are moist.      Pharynx: No oropharyngeal exudate or posterior oropharyngeal erythema. Eyes:      General:         Right eye: No discharge. Left eye: No discharge. Conjunctiva/sclera: Conjunctivae normal.      Pupils: Pupils are equal, round, and reactive to light. Neck:      Thyroid: No thyromegaly. Cardiovascular:      Rate and Rhythm: Normal rate and regular rhythm. Heart sounds: Normal heart sounds. No murmur heard. No friction rub. No gallop. Pulmonary:      Effort: Pulmonary effort is normal. No respiratory distress. Breath sounds: Normal breath sounds. No stridor. No wheezing or rales.    Abdominal:      General: Bowel sounds are normal. There is no distension. Palpations: Abdomen is soft. Tenderness: There is no abdominal tenderness. Musculoskeletal:      Cervical back: Normal range of motion and neck supple. Lymphadenopathy:      Cervical: No cervical adenopathy. Skin:     General: Skin is warm and dry. Findings: No erythema or rash. Neurological:      Mental Status: She is alert and oriented to person, place, and time. Psychiatric:         Behavior: Behavior normal.         Thought Content:  Thought content normal.         Judgment: Judgment normal.             Future Appointments   Date Time Provider [address]   11/22/2023 12:30 PM Kevyn Lemus, PT BE PT [facility]   11/27/2023 11:30 AM Kevyn Lemus PT BE PT [facility]   1/5/2024  2:45 PM Darryle Carolus, CRNP [facility]   1/22/2024  1:15 PM Saji Putnam MD [facility]   4/16/2024  6:40 AM BE MAMMO SLN 1 BE SLN Mammo BE [facility], [address] [place] [address]    Patient Care Team:  Sofia Polanco DO as PCP - MD Anyi Luna MD as Endoscopist

## 2023-11-22 ENCOUNTER — OFFICE VISIT (OUTPATIENT)
Dept: PHYSICAL THERAPY | Facility: OTHER | Age: 58
End: 2023-11-22
Payer: COMMERCIAL

## 2023-11-22 DIAGNOSIS — M22.2X2 PATELLOFEMORAL PAIN SYNDROME OF BOTH KNEES: Primary | ICD-10-CM

## 2023-11-22 DIAGNOSIS — M22.2X1 PATELLOFEMORAL PAIN SYNDROME OF BOTH KNEES: Primary | ICD-10-CM

## 2023-11-22 PROCEDURE — 97110 THERAPEUTIC EXERCISES: CPT

## 2023-11-22 PROCEDURE — 97112 NEUROMUSCULAR REEDUCATION: CPT

## 2023-11-22 NOTE — PROGRESS NOTES
Daily Note     Today's date: 2023  Patient name: Ramya Harmon  : 1965  MRN: [id number]  Referring provider: Lang Vargas  Dx:   Encounter Diagnosis     ICD-10-CM    1. Patellofemoral pain syndrome of both knees  M22.2X1     M22.2X2           Start Time: 1225  Stop Time: 1312  Total time in clinic (min): 47 minutes  [facility] PT 1 on 1 from 4300-9035 (39 minutes total)    Subjective: Patient reports her knee is doing well today, but does have a little low back discomfort. She reports she has not been as compliant with her HEP, but has been trying to do what she can. Objective: See treatment diary below      Assessment: Tolerated treatment well with continued focus on hip/LE strengthening and mobility. Educated patient on importance of HEP compliance for best outcome. Patient demonstrated fatigue post treatment, exhibited good technique with therapeutic exercises, and would benefit from continued PT. Plan: Continue per plan of care. Precautions: Positional Vertigo, Anxiety/Panic Disorders, Arthritis, Asthma, BMI > 30, Depression, HTN. Access Code: [email or web address]  URL: [email or web address]  Date: 10/30/2023  Prepared by: Jesus Lot    Exercises  - Single Leg Bridge  - 1 x daily - 7 x weekly - 3 sets - 8 reps - 3 seconds hold  - Clamshell with Resistance  - 1 x daily - 7 x weekly - 3 sets - 12 reps  - Side Stepping with Resistance at Feet  - 1 x daily - 7 x weekly - 3 sets - 12 steps  - Forward Step Down Touch with Heel  - 1 x daily - 7 x weekly - 2 sets - 10 reps  - Kettlebell Squat  - 1 x daily - 7 x weekly - 3 sets - 12 reps  - Supine Sciatic Nerve Glide  - 1 x daily - 7 x weekly - 3 sets - 10 reps    POC expires Unit limit Auth Expiration date PT/OT + Visit Limit?    24 N/A N/A BOMN                               Visit 1 IE 2 3      Manuals 10/30/23 11/14/23 11/22/23      Knee PROM         Soft Tissue Deformation                  Assessment  [facility]        Neuro Re-Ed Sciatic nerve glide supine 2x10 HEP        Glute Bridge SL 2x5 HEP Rev HEP       Clamshell BTB 2x10 HEP Rev HEP       Stand Hip Abd/Ext  OTB 2x10 ea OTB 3x10 ea      Glute med iso wall  3x15" ea 5x10" ea                                 Ther Ex         Bike  5' 10' bloodflow      Leg Press   (Seat 3 length 2) DL 90# 3x15      Side Stepping BTB feet 3 laps half turf HEP Rev HEP       Lateral lunge opener stretch  10# KB 10x10" ea 10# KB 10x10" ea      HR   3x10 off biodex                        Ther Activity         Step Ups         FSD 2x5 pain free range         LSU  Discomfort/ difficulty with eccentric        Squatting KB 10# 2x10 Form/ assessment KB 10# 3x10      Gait Training                           Modalities

## 2023-11-27 ENCOUNTER — APPOINTMENT (OUTPATIENT)
Dept: PHYSICAL THERAPY | Facility: OTHER | Age: 58
End: 2023-11-27
Payer: COMMERCIAL

## 2023-11-27 DIAGNOSIS — M22.2X1 PATELLOFEMORAL PAIN SYNDROME OF BOTH KNEES: Primary | ICD-10-CM

## 2023-11-27 DIAGNOSIS — M22.2X2 PATELLOFEMORAL PAIN SYNDROME OF BOTH KNEES: Primary | ICD-10-CM

## 2024-02-10 ENCOUNTER — APPOINTMENT (OUTPATIENT)
Dept: LAB | Age: 59
End: 2024-02-10
Payer: COMMERCIAL

## 2024-02-10 DIAGNOSIS — E78.2 HYPERLIPEMIA, MIXED: ICD-10-CM

## 2024-02-10 DIAGNOSIS — Z13.228 SCREENING FOR METABOLIC DISORDER: ICD-10-CM

## 2024-02-10 DIAGNOSIS — R73.01 ELEVATED FASTING GLUCOSE: ICD-10-CM

## 2024-02-10 DIAGNOSIS — I10 ESSENTIAL HYPERTENSION: ICD-10-CM

## 2024-02-10 LAB
25(OH)D3 SERPL-MCNC: 68.6 NG/ML (ref 30–100)
ALBUMIN SERPL BCP-MCNC: 4.3 G/DL (ref 3.5–5)
ALP SERPL-CCNC: 48 U/L (ref 34–104)
ALT SERPL W P-5'-P-CCNC: 34 U/L (ref 7–52)
ANION GAP SERPL CALCULATED.3IONS-SCNC: 10 MMOL/L
AST SERPL W P-5'-P-CCNC: 25 U/L (ref 13–39)
BASOPHILS # BLD AUTO: 0.04 THOUSANDS/ÂΜL (ref 0–0.1)
BASOPHILS NFR BLD AUTO: 1 % (ref 0–1)
BILIRUB SERPL-MCNC: 1.11 MG/DL (ref 0.2–1)
BUN SERPL-MCNC: 13 MG/DL (ref 5–25)
CALCIUM SERPL-MCNC: 9.6 MG/DL (ref 8.4–10.2)
CHLORIDE SERPL-SCNC: 104 MMOL/L (ref 96–108)
CHOLEST SERPL-MCNC: 235 MG/DL
CO2 SERPL-SCNC: 29 MMOL/L (ref 21–32)
CREAT SERPL-MCNC: 0.72 MG/DL (ref 0.6–1.3)
EOSINOPHIL # BLD AUTO: 0.31 THOUSAND/ÂΜL (ref 0–0.61)
EOSINOPHIL NFR BLD AUTO: 6 % (ref 0–6)
ERYTHROCYTE [DISTWIDTH] IN BLOOD BY AUTOMATED COUNT: 12.5 % (ref 11.6–15.1)
EST. AVERAGE GLUCOSE BLD GHB EST-MCNC: 117 MG/DL
GFR SERPL CREATININE-BSD FRML MDRD: 90 ML/MIN/1.73SQ M
GLUCOSE P FAST SERPL-MCNC: 97 MG/DL (ref 65–99)
HBA1C MFR BLD: 5.7 %
HCT VFR BLD AUTO: 41.9 % (ref 34.8–46.1)
HDLC SERPL-MCNC: 41 MG/DL
HGB BLD-MCNC: 13.9 G/DL (ref 11.5–15.4)
IMM GRANULOCYTES # BLD AUTO: 0.01 THOUSAND/UL (ref 0–0.2)
IMM GRANULOCYTES NFR BLD AUTO: 0 % (ref 0–2)
LDLC SERPL CALC-MCNC: 124 MG/DL (ref 0–100)
LYMPHOCYTES # BLD AUTO: 1.93 THOUSANDS/ÂΜL (ref 0.6–4.47)
LYMPHOCYTES NFR BLD AUTO: 37 % (ref 14–44)
MCH RBC QN AUTO: 29.1 PG (ref 26.8–34.3)
MCHC RBC AUTO-ENTMCNC: 33.2 G/DL (ref 31.4–37.4)
MCV RBC AUTO: 88 FL (ref 82–98)
MONOCYTES # BLD AUTO: 0.62 THOUSAND/ÂΜL (ref 0.17–1.22)
MONOCYTES NFR BLD AUTO: 12 % (ref 4–12)
NEUTROPHILS # BLD AUTO: 2.38 THOUSANDS/ÂΜL (ref 1.85–7.62)
NEUTS SEG NFR BLD AUTO: 44 % (ref 43–75)
NRBC BLD AUTO-RTO: 0 /100 WBCS
PLATELET # BLD AUTO: 210 THOUSANDS/UL (ref 149–390)
PMV BLD AUTO: 10.5 FL (ref 8.9–12.7)
POTASSIUM SERPL-SCNC: 3.8 MMOL/L (ref 3.5–5.3)
PROT SERPL-MCNC: 7.1 G/DL (ref 6.4–8.4)
RBC # BLD AUTO: 4.77 MILLION/UL (ref 3.81–5.12)
SODIUM SERPL-SCNC: 143 MMOL/L (ref 135–147)
TRIGL SERPL-MCNC: 351 MG/DL
WBC # BLD AUTO: 5.29 THOUSAND/UL (ref 4.31–10.16)

## 2024-02-10 PROCEDURE — 80061 LIPID PANEL: CPT

## 2024-02-10 PROCEDURE — 80053 COMPREHEN METABOLIC PANEL: CPT

## 2024-02-11 DIAGNOSIS — I10 ESSENTIAL HYPERTENSION: ICD-10-CM

## 2024-02-12 ENCOUNTER — TELEPHONE (OUTPATIENT)
Dept: FAMILY MEDICINE CLINIC | Facility: CLINIC | Age: 59
End: 2024-02-12

## 2024-02-12 RX ORDER — LOSARTAN POTASSIUM AND HYDROCHLOROTHIAZIDE 12.5; 5 MG/1; MG/1
1 TABLET ORAL DAILY
Qty: 90 TABLET | Refills: 1 | Status: SHIPPED | OUTPATIENT
Start: 2024-02-12

## 2024-02-12 NOTE — TELEPHONE ENCOUNTER
Looks like p[lj diego established withj a New PCP - she should make an appt to discuss her lipids with new office

## 2024-02-14 ENCOUNTER — HOSPITAL ENCOUNTER (OUTPATIENT)
Dept: BONE DENSITY | Facility: MEDICAL CENTER | Age: 59
Discharge: HOME | End: 2024-02-14
Payer: COMMERCIAL

## 2024-02-14 DIAGNOSIS — Z78.0 POST-MENOPAUSAL: ICD-10-CM

## 2024-02-14 PROCEDURE — 77080 DXA BONE DENSITY AXIAL: CPT

## 2024-02-19 ENCOUNTER — OFFICE VISIT (OUTPATIENT)
Age: 59
End: 2024-02-19
Payer: COMMERCIAL

## 2024-02-19 ENCOUNTER — TELEPHONE (OUTPATIENT)
Age: 59
End: 2024-02-19

## 2024-02-19 VITALS
WEIGHT: 192 LBS | SYSTOLIC BLOOD PRESSURE: 112 MMHG | HEIGHT: 60 IN | TEMPERATURE: 97.5 F | OXYGEN SATURATION: 96 % | BODY MASS INDEX: 37.69 KG/M2 | DIASTOLIC BLOOD PRESSURE: 78 MMHG | HEART RATE: 82 BPM

## 2024-02-19 DIAGNOSIS — K21.9 GASTROESOPHAGEAL REFLUX DISEASE, UNSPECIFIED WHETHER ESOPHAGITIS PRESENT: ICD-10-CM

## 2024-02-19 DIAGNOSIS — G89.29 CHRONIC PAIN OF LEFT KNEE: ICD-10-CM

## 2024-02-19 DIAGNOSIS — M25.562 CHRONIC PAIN OF LEFT KNEE: ICD-10-CM

## 2024-02-19 DIAGNOSIS — E66.09 CLASS 1 OBESITY DUE TO EXCESS CALORIES WITH SERIOUS COMORBIDITY AND BODY MASS INDEX (BMI) OF 34.0 TO 34.9 IN ADULT: ICD-10-CM

## 2024-02-19 DIAGNOSIS — R73.03 PRE-DIABETES: ICD-10-CM

## 2024-02-19 DIAGNOSIS — E78.2 HYPERLIPEMIA, MIXED: ICD-10-CM

## 2024-02-19 DIAGNOSIS — I10 ESSENTIAL HYPERTENSION: ICD-10-CM

## 2024-02-19 DIAGNOSIS — M25.562 CHRONIC PAIN OF LEFT KNEE: Primary | ICD-10-CM

## 2024-02-19 DIAGNOSIS — E78.2 MIXED HYPERLIPIDEMIA: ICD-10-CM

## 2024-02-19 DIAGNOSIS — Z13.228 SCREENING FOR METABOLIC DISORDER: ICD-10-CM

## 2024-02-19 DIAGNOSIS — G89.29 CHRONIC PAIN OF LEFT KNEE: Primary | ICD-10-CM

## 2024-02-19 DIAGNOSIS — K76.0 HEPATIC STEATOSIS: ICD-10-CM

## 2024-02-19 DIAGNOSIS — J45.909 UNCOMPLICATED ASTHMA, UNSPECIFIED ASTHMA SEVERITY, UNSPECIFIED WHETHER PERSISTENT: ICD-10-CM

## 2024-02-19 PROCEDURE — 99214 OFFICE O/P EST MOD 30 MIN: CPT | Performed by: NURSE PRACTITIONER

## 2024-02-19 RX ORDER — FENOFIBRATE 134 MG/1
134 CAPSULE ORAL
Qty: 90 CAPSULE | Refills: 1 | Status: SHIPPED | OUTPATIENT
Start: 2024-02-19

## 2024-02-19 RX ORDER — FENOFIBRATE 134 MG/1
134 CAPSULE ORAL
Qty: 90 CAPSULE | Refills: 0 | Status: SHIPPED | OUTPATIENT
Start: 2024-02-19 | End: 2024-02-19 | Stop reason: SDUPTHER

## 2024-02-19 RX ORDER — MELOXICAM 7.5 MG/1
7.5 TABLET ORAL DAILY
Qty: 90 TABLET | Refills: 0 | Status: SHIPPED | OUTPATIENT
Start: 2024-02-19 | End: 2024-02-19 | Stop reason: SDUPTHER

## 2024-02-19 RX ORDER — MELOXICAM 7.5 MG/1
7.5 TABLET ORAL DAILY
Qty: 90 TABLET | Refills: 1 | Status: SHIPPED | OUTPATIENT
Start: 2024-02-19

## 2024-02-19 NOTE — PROGRESS NOTES
Assessment/Plan:    Hepatic steatosis  LFT within normal range   Discussed dietary changes and encouraged increased exercise     GERD (gastroesophageal reflux disease)  -controlled on pepcid     Asthma  -well controlled without exacerbation     Essential hypertension  Well controlled on losartan-HCTZ    Pre-diabetes  Patient is to continue to work on diet and exercise.  Limit sugars and carbohydrate intake.    Avoid soda, juice, sweets, cookies, desserts, pasta, bread.   Eat more whole grains, exercised 30 min of cardio at least 3 times a week.  Also recommended daily foot exams to check for sores, and recommended yearly eye exams.       Hyperlipemia, mixed  -Reports intolerance to Zetia and statin  -Patient willing to try fenofibrate  -Will try statin every second or third day once we get results of updated lipid panel in 3 months  -Dietary changes and increased exercise    Chronic pain of left knee  -continue to follow with orthopedic   -mobic only as needed, trend LFTs              Diagnoses and all orders for this visit:    Chronic pain of left knee  -     Discontinue: meloxicam (Mobic) 7.5 mg tablet; Take 1 tablet (7.5 mg total) by mouth daily    Hyperlipemia, mixed  -     Discontinue: fenofibrate micronized (LOFIBRA) 134 MG capsule; Take 1 capsule (134 mg total) by mouth daily with breakfast  -     Comprehensive metabolic panel; Future    Screening for metabolic disorder  -     Lipid panel    Mixed hyperlipidemia  -     Lipid panel    Hepatic steatosis    Gastroesophageal reflux disease, unspecified whether esophagitis present    Uncomplicated asthma, unspecified asthma severity, unspecified whether persistent    Essential hypertension    Pre-diabetes    Class 1 obesity due to excess calories with serious comorbidity and body mass index (BMI) of 34.0 to 34.9 in adult          Subjective:      Patient ID: Rosa Resendiz is a 59 y.o. female.    Patient presents today to follow up on chronic conditions and to  review blood work.     Left knee pain- follows orthopedics, has relief with mobic, liver enzymes have been stable     Asthma-well controlled, currently does not use inhaler, typically worse with exercise     GERD-well-controlled with famotidine    Mixed hyperlipidemia-reports myalgia with statins and Zetia, ASCVD risk score 5.8%, cholesterol 235, triglycerides 351,   Take Krill oil     Essential hypertension-pressure well-controlled, denies any side effects with losartan-hydrochlorothiazide, denies headaches, dizziness, changes in vision    Pre-diabetes hemoglobin A1c 5.7, fasting glucose 117    Bilirubin noted to be mildly elevated at 1.11, denies right upper quadrant pain, enzymes within normal range, patient known history of fatty liver    DEXA scan completed February 2024, normal bone mineral density              The following portions of the patient's history were reviewed and updated as appropriate: allergies, current medications, past family history, past medical history, past social history, past surgical history, and problem list.    Review of Systems   Constitutional:  Negative for activity change, appetite change, chills, diaphoresis and fever.   HENT:  Negative for congestion, ear discharge, ear pain, postnasal drip, rhinorrhea, sinus pressure, sinus pain and sore throat.    Eyes:  Negative for pain, discharge, itching and visual disturbance.   Respiratory:  Negative for cough, chest tightness, shortness of breath and wheezing.    Cardiovascular:  Negative for chest pain, palpitations and leg swelling.   Gastrointestinal:  Negative for abdominal pain, constipation, diarrhea, nausea and vomiting.   Endocrine: Negative for polydipsia, polyphagia and polyuria.   Genitourinary:  Negative for difficulty urinating, dysuria and urgency.   Musculoskeletal:  Negative for arthralgias, back pain and neck pain.   Skin:  Negative for rash and wound.   Neurological:  Negative for dizziness, weakness, numbness  and headaches.         Past Medical History:   Diagnosis Date    Abscess of back     Resolved 1/11/2018     Adverse reaction to statin medication     Resolved 1/11/2018     Alcoholism (HCC)     AGE 26    Anesthesia complication     extreme dizziness upon awakening    Anxiety     Asthma     BRCA1 negative     thru 23&Me    Chronic pain disorder      rt side pinched nerve    Colon polyp     Depression     Edema     GERD (gastroesophageal reflux disease)     Glaucoma suspect 09/28/2023    Hyperlipidemia     Hypertension     Irregular heart beat     palpitations    Irritable bowel syndrome     Liver disease     fatty    Neck pain     Pinched nerve     Last assessed 9/26/2017     PONV (postoperative nausea and vomiting)     Tuberculosis     1986         Current Outpatient Medications:     acetaminophen (TYLENOL) 650 mg CR tablet, Take 650 mg by mouth every 8 (eight) hours as needed for mild pain, Disp: , Rfl:     Alpha-Lipoic Acid 600 MG CAPS, Take by mouth in the morning, Disp: , Rfl:     Calcium Carbonate-Vitamin D (CALCIUM 600+D HIGH POTENCY PO), Take by mouth daily, Disp: , Rfl:     Cholecalciferol (VITAMIN D3) 5000 units CAPS, Take by mouth daily, Disp: , Rfl:     famotidine (PEPCID) 40 MG tablet, Take 1 tablet by mouth daily as needed, Disp: , Rfl:     Flaxseed, Linseed, (FLAX SEED OIL) 1000 MG CAPS, Take 2 capsules by mouth daily , Disp: , Rfl:     ibuprofen (MOTRIN) 200 mg tablet, Take by mouth every 6 (six) hours as needed for mild pain , Disp: , Rfl:     lidocaine (XYLOCAINE) 5 % ointment, Apply topically 2 (two) times a day as needed for mild pain (for bilateral knee pain), Disp: 50 g, Rfl: 1    losartan-hydrochlorothiazide (HYZAAR) 50-12.5 mg per tablet, Take 1 tablet by mouth daily, Disp: 90 tablet, Rfl: 1    fenofibrate micronized (LOFIBRA) 134 MG capsule, Take 1 capsule (134 mg total) by mouth daily with breakfast, Disp: 90 capsule, Rfl: 1    meloxicam (Mobic) 7.5 mg tablet, Take 1 tablet (7.5 mg total) by  mouth daily, Disp: 90 tablet, Rfl: 1    Allergies   Allergen Reactions    Banana - Food Allergy GI Intolerance    Statins Arthralgia    Fish Oil [Omega-3 Fatty Acids] Myalgia     Lovaza    Zetia [Ezetimibe] Myalgia       Social History   Past Surgical History:   Procedure Laterality Date    CARDIAC CATHETERIZATION      due to brothers advanced heart disease and abnormal stress test    COLONOSCOPY N/A 2017    Procedure: COLONOSCOPY;  Surgeon: Garcia Spivey MD;  Location: [facility];  Service: Gastroenterology    COLONOSCOPY      ELBOW SURGERY Right     tendon repair, post op nausea and dizziness    ESOPHAGOGASTRODUODENOSCOPY N/A 2017    Procedure: ESOPHAGOGASTRODUODENOSCOPY (EGD);  Surgeon: Garcia Spivey MD;  Location: [facility];  Service: Gastroenterology    ESOPHAGOGASTRODUODENOSCOPY      Diagnostic     SKIN BIOPSY      Last assessed 2017     UPPER GASTROINTESTINAL ENDOSCOPY      WISDOM TOOTH EXTRACTION       Family History   Problem Relation Age of Onset    Alzheimer's disease Mother     Thyroid disease Mother             Edema Mother     Osteoarthritis Mother         Knee    Arthritis Mother             Stroke Father             Hypertension Father             Hyperlipidemia Father     Arthritis Father             Coronary artery disease Father     Hypothyroidism Father     Other Father         status post coronary artery bypass graft     Stroke Sister     Heart failure Sister     Heart disease Sister     Drug abuse Brother     Completed Suicide  Brother     No Known Problems Maternal Grandmother     No Known Problems Maternal Grandfather     No Known Problems Paternal Grandmother     No Known Problems Paternal Grandfather     Breast cancer Paternal Aunt 50    Colon cancer Maternal Uncle 75    No Known Problems Maternal Aunt     No Known Problems Maternal Aunt     Heart disease Brother                Objective:  /78 (BP  "Location: Left arm, Patient Position: Sitting, Cuff Size: Adult)   Pulse 82   Temp 97.5 °F (36.4 °C) (Temporal)   Ht 5' 0.25\" (1.53 m)   Wt 87.1 kg (192 lb)   LMP  (LMP Unknown)   SpO2 96% Comment: ra  BMI 37.19 kg/m²     Recent Results (from the past 1344 hour(s))   CBC and differential    Collection Time: 02/10/24  8:54 AM   Result Value Ref Range    WBC 5.29 4.31 - 10.16 Thousand/uL    RBC 4.77 3.81 - 5.12 Million/uL    Hemoglobin 13.9 11.5 - 15.4 g/dL    Hematocrit 41.9 34.8 - 46.1 %    MCV 88 82 - 98 fL    MCH 29.1 26.8 - 34.3 pg    MCHC 33.2 31.4 - 37.4 g/dL    RDW 12.5 11.6 - 15.1 %    MPV 10.5 8.9 - 12.7 fL    Platelets 210 149 - 390 Thousands/uL    nRBC 0 /100 WBCs    Neutrophils Relative 44 43 - 75 %    Immat GRANS % 0 0 - 2 %    Lymphocytes Relative 37 14 - 44 %    Monocytes Relative 12 4 - 12 %    Eosinophils Relative 6 0 - 6 %    Basophils Relative 1 0 - 1 %    Neutrophils Absolute 2.38 1.85 - 7.62 Thousands/µL    Immature Grans Absolute 0.01 0.00 - 0.20 Thousand/uL    Lymphocytes Absolute 1.93 0.60 - 4.47 Thousands/µL    Monocytes Absolute 0.62 0.17 - 1.22 Thousand/µL    Eosinophils Absolute 0.31 0.00 - 0.61 Thousand/µL    Basophils Absolute 0.04 0.00 - 0.10 Thousands/µL   Hemoglobin A1C    Collection Time: 02/10/24  8:54 AM   Result Value Ref Range    Hemoglobin A1C 5.7 (H) Normal 4.0-5.6%; PreDiabetic 5.7-6.4%; Diabetic >=6.5%; Glycemic control for adults with diabetes <7.0% %     mg/dl   Vitamin D 25 hydroxy    Collection Time: 02/10/24  8:54 AM   Result Value Ref Range    Vit D, 25-Hydroxy 68.6 30.0 - 100.0 ng/mL   Comprehensive metabolic panel    Collection Time: 02/10/24  8:54 AM   Result Value Ref Range    Sodium 143 135 - 147 mmol/L    Potassium 3.8 3.5 - 5.3 mmol/L    Chloride 104 96 - 108 mmol/L    CO2 29 21 - 32 mmol/L    ANION GAP 10 mmol/L    BUN 13 5 - 25 mg/dL    Creatinine 0.72 0.60 - 1.30 mg/dL    Glucose, Fasting 97 65 - 99 mg/dL    Calcium 9.6 8.4 - 10.2 mg/dL    AST 25 " 13 - 39 U/L    ALT 34 7 - 52 U/L    Alkaline Phosphatase 48 34 - 104 U/L    Total Protein 7.1 6.4 - 8.4 g/dL    Albumin 4.3 3.5 - 5.0 g/dL    Total Bilirubin 1.11 (H) 0.20 - 1.00 mg/dL    eGFR 90 ml/min/1.73sq m   Lipid Panel with Direct LDL reflex    Collection Time: 02/10/24  8:54 AM   Result Value Ref Range    Cholesterol 235 (H) See Comment mg/dL    Triglycerides 351 (H) See Comment mg/dL    HDL, Direct 41 (L) >=50 mg/dL    LDL Calculated 124 (H) 0 - 100 mg/dL            Physical Exam  Constitutional:       General: She is not in acute distress.     Appearance: She is well-developed. She is not diaphoretic.   HENT:      Head: Normocephalic and atraumatic.      Right Ear: External ear normal.      Left Ear: External ear normal.      Nose: Nose normal.      Mouth/Throat:      Mouth: Mucous membranes are moist.      Pharynx: No oropharyngeal exudate or posterior oropharyngeal erythema.   Eyes:      General:         Right eye: No discharge.         Left eye: No discharge.      Conjunctiva/sclera: Conjunctivae normal.      Pupils: Pupils are equal, round, and reactive to light.   Neck:      Thyroid: No thyromegaly.   Cardiovascular:      Rate and Rhythm: Normal rate and regular rhythm.      Heart sounds: Normal heart sounds. No murmur heard.     No friction rub. No gallop.   Pulmonary:      Effort: Pulmonary effort is normal. No respiratory distress.      Breath sounds: Normal breath sounds. No stridor. No wheezing or rales.   Abdominal:      General: Bowel sounds are normal. There is no distension.      Palpations: Abdomen is soft.      Tenderness: There is no abdominal tenderness.   Musculoskeletal:      Cervical back: Normal range of motion and neck supple.   Lymphadenopathy:      Cervical: No cervical adenopathy.   Skin:     General: Skin is warm and dry.      Findings: No erythema or rash.   Neurological:      Mental Status: She is alert and oriented to person, place, and time.   Psychiatric:         Behavior:  Behavior normal.         Thought Content: Thought content normal.         Judgment: Judgment normal.

## 2024-02-19 NOTE — TELEPHONE ENCOUNTER
Please send rx   fenofibrate micronized (LOFIBRA) 134 MG capsule and meloxicam (Mobic) 7.5 mg tablet to [id number] - [place], [place] - [address] [place]

## 2024-02-20 PROBLEM — M54.6 THORACIC SPINE PAIN: Status: RESOLVED | Noted: 2021-05-17 | Resolved: 2024-02-20

## 2024-02-20 PROBLEM — G89.29 CHRONIC PAIN OF LEFT KNEE: Status: ACTIVE | Noted: 2024-02-20

## 2024-02-20 PROBLEM — M25.562 CHRONIC PAIN OF LEFT KNEE: Status: ACTIVE | Noted: 2024-02-20

## 2024-02-20 PROBLEM — M48.02 SPINAL STENOSIS IN CERVICAL REGION: Status: RESOLVED | Noted: 2019-09-17 | Resolved: 2024-02-20

## 2024-02-20 PROBLEM — R10.9 ACUTE FLANK PAIN: Status: RESOLVED | Noted: 2023-03-03 | Resolved: 2024-02-20

## 2024-02-20 PROBLEM — M48.061 SPINAL STENOSIS OF LUMBAR REGION: Status: RESOLVED | Noted: 2022-02-18 | Resolved: 2024-02-20

## 2024-02-20 PROBLEM — M19.90 ARTHRITIS: Status: RESOLVED | Noted: 2017-10-13 | Resolved: 2024-02-20

## 2024-02-20 PROBLEM — M54.50 CHRONIC LOWER BACK PAIN: Status: RESOLVED | Noted: 2022-03-15 | Resolved: 2024-02-20

## 2024-02-20 PROBLEM — Z00.00 ANNUAL PHYSICAL EXAM: Status: RESOLVED | Noted: 2023-11-17 | Resolved: 2024-02-20

## 2024-02-20 PROBLEM — G89.29 CHRONIC LOWER BACK PAIN: Status: RESOLVED | Noted: 2022-03-15 | Resolved: 2024-02-20

## 2024-02-20 NOTE — ASSESSMENT & PLAN NOTE
Patient is to continue to work on diet and exercise.  Limit sugars and carbohydrate intake.    Avoid soda, juice, sweets, cookies, desserts, pasta, bread.   Eat more whole grains, exercised 30 min of cardio at least 3 times a week.  Also recommended daily foot exams to check for sores, and recommended yearly eye exams.

## 2024-02-20 NOTE — ASSESSMENT & PLAN NOTE
-Reports intolerance to Zetia and statin  -Patient willing to try fenofibrate  -Will try statin every second or third day once we get results of updated lipid panel in 3 months  -Dietary changes and increased exercise

## 2024-03-19 ENCOUNTER — FOLLOW UP (OUTPATIENT)
Dept: URBAN - METROPOLITAN AREA CLINIC 6 | Facility: CLINIC | Age: 59
End: 2024-03-19

## 2024-03-19 DIAGNOSIS — H40.023: ICD-10-CM

## 2024-03-19 DIAGNOSIS — H25.813: ICD-10-CM

## 2024-03-19 PROCEDURE — 92012 INTRM OPH EXAM EST PATIENT: CPT

## 2024-03-19 PROCEDURE — 92083 EXTENDED VISUAL FIELD XM: CPT

## 2024-03-19 ASSESSMENT — TONOMETRY
OS_IOP_MMHG: 18
OD_IOP_MMHG: 18

## 2024-03-19 ASSESSMENT — VISUAL ACUITY
OS_SC: 20/25-2
OD_SC: 20/30+1

## 2024-04-01 ENCOUNTER — OFFICE VISIT (OUTPATIENT)
Age: 59
End: 2024-04-01
Payer: COMMERCIAL

## 2024-04-01 VITALS
TEMPERATURE: 97.4 F | HEART RATE: 101 BPM | BODY MASS INDEX: 34.81 KG/M2 | SYSTOLIC BLOOD PRESSURE: 140 MMHG | WEIGHT: 184.4 LBS | DIASTOLIC BLOOD PRESSURE: 90 MMHG | HEIGHT: 61 IN | OXYGEN SATURATION: 96 %

## 2024-04-01 DIAGNOSIS — I10 ESSENTIAL HYPERTENSION: ICD-10-CM

## 2024-04-01 DIAGNOSIS — J40 BRONCHITIS: Primary | ICD-10-CM

## 2024-04-01 PROCEDURE — 99213 OFFICE O/P EST LOW 20 MIN: CPT | Performed by: NURSE PRACTITIONER

## 2024-04-01 RX ORDER — AZITHROMYCIN 250 MG/1
TABLET, FILM COATED ORAL
Qty: 6 TABLET | Refills: 0 | Status: SHIPPED | OUTPATIENT
Start: 2024-04-01 | End: 2024-04-05

## 2024-04-01 NOTE — ASSESSMENT & PLAN NOTE
Well controlled on losartan-HCTZ    BP elevated while in the office today (patient has not taken medication yet this AM), advised to continue to monitor and notify me if any consistent elevations

## 2024-04-01 NOTE — ASSESSMENT & PLAN NOTE
-Start azithromycin, guaifenesin as, start Flonase  -Rest and fluids advised.   Educated that the course of this illness could be 2-4 weeks.   Discussed symptomatic relief, such as warm steam inhalations, tylenol/ibuprofen for fevers and body aches, rest, and drink plenty of fluids.  Warm salt gargles for sore throat.   Discussed red flag signs to go to the ER, such as chest pain or shortness of breath.   Return to the office for reevaluation if symptoms worsen or do not improve in 1-2 weeks

## 2024-04-01 NOTE — PROGRESS NOTES
" Assessment/Plan:    Bronchitis  -Start azithromycin, guaifenesin as, start Flonase  -Rest and fluids advised.   Educated that the course of this illness could be 2-4 weeks.   Discussed symptomatic relief, such as warm steam inhalations, tylenol/ibuprofen for fevers and body aches, rest, and drink plenty of fluids.  Warm salt gargles for sore throat.   Discussed red flag signs to go to the ER, such as chest pain or shortness of breath.   Return to the office for reevaluation if symptoms worsen or do not improve in 1-2 weeks       Essential hypertension  Well controlled on losartan-HCTZ    BP elevated while in the office today (patient has not taken medication yet this AM), advised to continue to monitor and notify me if any consistent elevations              Diagnoses and all orders for this visit:    Bronchitis  -     azithromycin (ZITHROMAX) 250 mg tablet; Take 2 tablets today then 1 tablet daily x 4 days    Essential hypertension          Subjective:      Patient ID: Rosa Resendiz is a 59 y.o. female.    Patient presents with cold like symptoms.     Denies sick contacts       URI   This is a new problem. The current episode started in the past 7 days. The problem has been gradually worsening. There has been no fever. Associated symptoms include congestion, coughing, headaches, joint pain, a sore throat and vomiting (due to coughing). Pertinent negatives include no abdominal pain, chest pain, diarrhea, dysuria, ear pain, nausea, neck pain, rash, rhinorrhea, sinus pain or wheezing. Associated symptoms comments: \"Hurts to take deep breaths\". She has tried acetaminophen (Mucinex) for the symptoms. The treatment provided no relief.       The following portions of the patient's history were reviewed and updated as appropriate: allergies, current medications, past family history, past medical history, past social history, past surgical history, and problem list.    Review of Systems   Constitutional:  Negative for " activity change, appetite change, chills, diaphoresis and fever.   HENT:  Positive for congestion and sore throat. Negative for ear discharge, ear pain, postnasal drip, rhinorrhea, sinus pressure and sinus pain.    Eyes:  Negative for pain, discharge, itching and visual disturbance.   Respiratory:  Positive for cough. Negative for chest tightness, shortness of breath and wheezing.    Cardiovascular:  Negative for chest pain, palpitations and leg swelling.   Gastrointestinal:  Positive for vomiting (due to coughing). Negative for abdominal pain, constipation, diarrhea and nausea.   Endocrine: Negative for polydipsia, polyphagia and polyuria.   Genitourinary:  Negative for difficulty urinating, dysuria and urgency.   Musculoskeletal:  Positive for joint pain. Negative for arthralgias, back pain and neck pain.   Skin:  Negative for rash and wound.   Neurological:  Positive for headaches. Negative for dizziness, weakness and numbness.         Past Medical History:   Diagnosis Date    Abscess of back     Resolved 1/11/2018     Adverse reaction to statin medication     Resolved 1/11/2018     Alcoholism (HCC)     AGE 26    Anesthesia complication     extreme dizziness upon awakening    Anxiety     Asthma     BRCA1 negative     thru 23&Me    Chronic pain disorder      rt side pinched nerve    Colon polyp     Depression     Edema     GERD (gastroesophageal reflux disease)     Glaucoma suspect 09/28/2023    Hyperlipidemia     Hypertension     Irregular heart beat     palpitations    Irritable bowel syndrome     Liver disease     fatty    Neck pain     Pinched nerve     Last assessed 9/26/2017     PONV (postoperative nausea and vomiting)     Tuberculosis     1986         Current Outpatient Medications:     acetaminophen (TYLENOL) 650 mg CR tablet, Take 650 mg by mouth every 8 (eight) hours as needed for mild pain, Disp: , Rfl:     Alpha-Lipoic Acid 600 MG CAPS, Take by mouth in the morning, Disp: , Rfl:     azithromycin  (ZITHROMAX) 250 mg tablet, Take 2 tablets today then 1 tablet daily x 4 days, Disp: 6 tablet, Rfl: 0    Calcium Carbonate-Vitamin D (CALCIUM 600+D HIGH POTENCY PO), Take by mouth daily, Disp: , Rfl:     Cholecalciferol (VITAMIN D3) 5000 units CAPS, Take by mouth daily, Disp: , Rfl:     famotidine (PEPCID) 40 MG tablet, Take 1 tablet by mouth daily as needed, Disp: , Rfl:     fenofibrate micronized (LOFIBRA) 134 MG capsule, Take 1 capsule (134 mg total) by mouth daily with breakfast, Disp: 90 capsule, Rfl: 1    Flaxseed, Linseed, (FLAX SEED OIL) 1000 MG CAPS, Take 2 capsules by mouth daily , Disp: , Rfl:     ibuprofen (MOTRIN) 200 mg tablet, Take by mouth every 6 (six) hours as needed for mild pain , Disp: , Rfl:     lidocaine (XYLOCAINE) 5 % ointment, Apply topically 2 (two) times a day as needed for mild pain (for bilateral knee pain), Disp: 50 g, Rfl: 1    losartan-hydrochlorothiazide (HYZAAR) 50-12.5 mg per tablet, Take 1 tablet by mouth daily, Disp: 90 tablet, Rfl: 1    meloxicam (Mobic) 7.5 mg tablet, Take 1 tablet (7.5 mg total) by mouth daily, Disp: 90 tablet, Rfl: 1    Allergies   Allergen Reactions    Banana - Food Allergy GI Intolerance    Statins Arthralgia    Fish Oil [Omega-3 Fatty Acids] Myalgia     Lovaza    Zetia [Ezetimibe] Myalgia       Social History   Past Surgical History:   Procedure Laterality Date    CARDIAC CATHETERIZATION      due to brothers advanced heart disease and abnormal stress test    COLONOSCOPY N/A 12/6/2017    Procedure: COLONOSCOPY;  Surgeon: Garcia Spivey MD;  Location: [facility];  Service: Gastroenterology    COLONOSCOPY  1986    ELBOW SURGERY Right     tendon repair, post op nausea and dizziness    ESOPHAGOGASTRODUODENOSCOPY N/A 12/6/2017    Procedure: ESOPHAGOGASTRODUODENOSCOPY (EGD);  Surgeon: Garcia Spivey MD;  Location: [facility];  Service: Gastroenterology    ESOPHAGOGASTRODUODENOSCOPY  1986    Diagnostic     SKIN BIOPSY      Last assessed 9/28/2017     UPPER  "GASTROINTESTINAL ENDOSCOPY      WISDOM TOOTH EXTRACTION       Family History   Problem Relation Age of Onset    Alzheimer's disease Mother     Thyroid disease Mother             Edema Mother     Osteoarthritis Mother         Knee    Arthritis Mother             Stroke Father             Hypertension Father             Hyperlipidemia Father     Arthritis Father             Coronary artery disease Father     Hypothyroidism Father     Other Father         status post coronary artery bypass graft     Stroke Sister     Heart failure Sister     Heart disease Sister     Drug abuse Brother     Completed Suicide  Brother     No Known Problems Maternal Grandmother     No Known Problems Maternal Grandfather     No Known Problems Paternal Grandmother     No Known Problems Paternal Grandfather     Breast cancer Paternal Aunt 50    Colon cancer Maternal Uncle 75    No Known Problems Maternal Aunt     No Known Problems Maternal Aunt     Heart disease Brother                Objective:  /90   Pulse 101   Temp (!) 97.4 °F (36.3 °C) (Temporal)   Ht 5' 0.63\" (1.54 m)   Wt 83.6 kg (184 lb 6.4 oz)   LMP  (LMP Unknown)   SpO2 96%   BMI 35.27 kg/m²     Recent Results (from the past 1344 hour(s))   CBC and differential    Collection Time: 02/10/24  8:54 AM   Result Value Ref Range    WBC 5.29 4.31 - 10.16 Thousand/uL    RBC 4.77 3.81 - 5.12 Million/uL    Hemoglobin 13.9 11.5 - 15.4 g/dL    Hematocrit 41.9 34.8 - 46.1 %    MCV 88 82 - 98 fL    MCH 29.1 26.8 - 34.3 pg    MCHC 33.2 31.4 - 37.4 g/dL    RDW 12.5 11.6 - 15.1 %    MPV 10.5 8.9 - 12.7 fL    Platelets 210 149 - 390 Thousands/uL    nRBC 0 /100 WBCs    Neutrophils Relative 44 43 - 75 %    Immature Grans % 0 0 - 2 %    Lymphocytes Relative 37 14 - 44 %    Monocytes Relative 12 4 - 12 %    Eosinophils Relative 6 0 - 6 %    Basophils Relative 1 0 - 1 %    Neutrophils Absolute 2.38 1.85 - 7.62 Thousands/µL    Absolute Immature " Grans 0.01 0.00 - 0.20 Thousand/uL    Absolute Lymphocytes 1.93 0.60 - 4.47 Thousands/µL    Absolute Monocytes 0.62 0.17 - 1.22 Thousand/µL    Eosinophils Absolute 0.31 0.00 - 0.61 Thousand/µL    Basophils Absolute 0.04 0.00 - 0.10 Thousands/µL   Hemoglobin A1C    Collection Time: 02/10/24  8:54 AM   Result Value Ref Range    Hemoglobin A1C 5.7 (H) Normal 4.0-5.6%; PreDiabetic 5.7-6.4%; Diabetic >=6.5%; Glycemic control for adults with diabetes <7.0% %     mg/dl   Vitamin D 25 hydroxy    Collection Time: 02/10/24  8:54 AM   Result Value Ref Range    Vit D, 25-Hydroxy 68.6 30.0 - 100.0 ng/mL   Comprehensive metabolic panel    Collection Time: 02/10/24  8:54 AM   Result Value Ref Range    Sodium 143 135 - 147 mmol/L    Potassium 3.8 3.5 - 5.3 mmol/L    Chloride 104 96 - 108 mmol/L    CO2 29 21 - 32 mmol/L    ANION GAP 10 mmol/L    BUN 13 5 - 25 mg/dL    Creatinine 0.72 0.60 - 1.30 mg/dL    Glucose, Fasting 97 65 - 99 mg/dL    Calcium 9.6 8.4 - 10.2 mg/dL    AST 25 13 - 39 U/L    ALT 34 7 - 52 U/L    Alkaline Phosphatase 48 34 - 104 U/L    Total Protein 7.1 6.4 - 8.4 g/dL    Albumin 4.3 3.5 - 5.0 g/dL    Total Bilirubin 1.11 (H) 0.20 - 1.00 mg/dL    eGFR 90 ml/min/1.73sq m   Lipid Panel with Direct LDL reflex    Collection Time: 02/10/24  8:54 AM   Result Value Ref Range    Cholesterol 235 (H) See Comment mg/dL    Triglycerides 351 (H) See Comment mg/dL    HDL, Direct 41 (L) >=50 mg/dL    LDL Calculated 124 (H) 0 - 100 mg/dL            Physical Exam  Constitutional:       General: She is not in acute distress.     Appearance: She is well-developed. She is not diaphoretic.   HENT:      Head: Normocephalic and atraumatic.      Right Ear: External ear normal. A middle ear effusion is present.      Left Ear: External ear normal. A middle ear effusion is present.      Nose: Nose normal.      Mouth/Throat:      Mouth: Mucous membranes are moist.      Pharynx: Posterior oropharyngeal erythema present. No oropharyngeal  exudate.   Eyes:      General:         Right eye: No discharge.         Left eye: No discharge.      Conjunctiva/sclera: Conjunctivae normal.      Pupils: Pupils are equal, round, and reactive to light.   Neck:      Thyroid: No thyromegaly.   Cardiovascular:      Rate and Rhythm: Normal rate and regular rhythm.      Heart sounds: Normal heart sounds. No murmur heard.     No friction rub. No gallop.   Pulmonary:      Effort: Pulmonary effort is normal. No respiratory distress.      Breath sounds: Normal breath sounds. No stridor. No wheezing or rales.   Abdominal:      General: Bowel sounds are normal. There is no distension.      Palpations: Abdomen is soft.      Tenderness: There is no abdominal tenderness.   Musculoskeletal:      Cervical back: Normal range of motion and neck supple.   Lymphadenopathy:      Head:      Right side of head: Tonsillar adenopathy present.      Left side of head: Tonsillar adenopathy present.      Cervical: No cervical adenopathy.   Skin:     General: Skin is warm and dry.      Findings: No erythema or rash.   Neurological:      Mental Status: She is alert and oriented to person, place, and time.   Psychiatric:         Behavior: Behavior normal.         Thought Content: Thought content normal.         Judgment: Judgment normal.

## 2024-04-16 ENCOUNTER — HOSPITAL ENCOUNTER (OUTPATIENT)
Dept: RADIOLOGY | Age: 59
Discharge: HOME | End: 2024-04-16
Payer: COMMERCIAL

## 2024-04-16 VITALS — BODY MASS INDEX: 36.06 KG/M2 | WEIGHT: 191 LBS | HEIGHT: 61 IN

## 2024-04-16 DIAGNOSIS — Z12.31 VISIT FOR SCREENING MAMMOGRAM: ICD-10-CM

## 2024-04-16 PROCEDURE — 77067 SCR MAMMO BI INCL CAD: CPT

## 2024-04-16 PROCEDURE — 77063 BREAST TOMOSYNTHESIS BI: CPT

## 2024-05-08 DIAGNOSIS — E78.2 HYPERLIPEMIA, MIXED: ICD-10-CM

## 2024-05-08 RX ORDER — FENOFIBRATE 134 MG/1
134 CAPSULE ORAL
Qty: 90 CAPSULE | Refills: 1 | Status: SHIPPED | OUTPATIENT
Start: 2024-05-08

## 2024-05-18 DIAGNOSIS — G89.29 CHRONIC PAIN OF LEFT KNEE: ICD-10-CM

## 2024-05-18 DIAGNOSIS — M25.562 CHRONIC PAIN OF LEFT KNEE: ICD-10-CM

## 2024-05-18 DIAGNOSIS — I10 ESSENTIAL HYPERTENSION: ICD-10-CM

## 2024-05-18 RX ORDER — LOSARTAN POTASSIUM AND HYDROCHLOROTHIAZIDE 12.5; 5 MG/1; MG/1
1 TABLET ORAL DAILY
Qty: 90 TABLET | Refills: 0 | Status: SHIPPED | OUTPATIENT
Start: 2024-05-18

## 2024-05-18 RX ORDER — MELOXICAM 7.5 MG/1
7.5 TABLET ORAL DAILY
Qty: 90 TABLET | Refills: 0 | Status: SHIPPED | OUTPATIENT
Start: 2024-05-18

## 2024-05-21 ENCOUNTER — TELEPHONE (OUTPATIENT)
Age: 59
End: 2024-05-21

## 2024-05-21 NOTE — TELEPHONE ENCOUNTER
Emma from [facility] called with drug interaction with losartan-hydrochlorothiazide and meloxicam interaction is decrease of hypertensive effect and can cause kidney issues.     Please call Radha back if ok  [phone]     Thank you

## 2024-07-10 ENCOUNTER — ANNUAL EXAM (OUTPATIENT)
Dept: OBGYN CLINIC | Facility: CLINIC | Age: 59
End: 2024-07-10
Payer: COMMERCIAL

## 2024-07-10 VITALS
DIASTOLIC BLOOD PRESSURE: 80 MMHG | HEIGHT: 61 IN | BODY MASS INDEX: 33.61 KG/M2 | WEIGHT: 178 LBS | SYSTOLIC BLOOD PRESSURE: 120 MMHG

## 2024-07-10 DIAGNOSIS — Z01.419 WELL WOMAN EXAM WITH ROUTINE GYNECOLOGICAL EXAM: Primary | ICD-10-CM

## 2024-07-10 DIAGNOSIS — Z11.51 SCREENING FOR HPV (HUMAN PAPILLOMAVIRUS): ICD-10-CM

## 2024-07-10 DIAGNOSIS — Z12.4 ENCOUNTER FOR SCREENING FOR MALIGNANT NEOPLASM OF CERVIX: ICD-10-CM

## 2024-07-10 PROCEDURE — S0612 ANNUAL GYNECOLOGICAL EXAMINA: HCPCS | Performed by: OBSTETRICS & GYNECOLOGY

## 2024-07-10 PROCEDURE — G0476 HPV COMBO ASSAY CA SCREEN: HCPCS | Performed by: OBSTETRICS & GYNECOLOGY

## 2024-07-10 PROCEDURE — G0145 SCR C/V CYTO,THINLAYER,RESCR: HCPCS | Performed by: OBSTETRICS & GYNECOLOGY

## 2024-07-10 NOTE — PROGRESS NOTES
ASSESSMENT & PLAN:   Diagnoses and all orders for this visit:    Well woman exam with routine gynecological exam  -     Liquid-based pap, screening    Encounter for screening for malignant neoplasm of cervix  -     Liquid-based pap, screening    Screening for HPV (human papillomavirus)  -     Liquid-based pap, screening          The following were reviewed in today's visit: ASCCP guidelines, Gardisil vaccination, STD testing breast self exam, mammography screening ordered, menopause, and exercise.    Patient to return to office in yearly for annual exam.     All questions have been answered to her satisfaction.        CC:  Annual Gynecologic Examination  Chief Complaint   Patient presents with    Gynecologic Exam     Rosa Resendiz is here for her annual exam; pap ordered/mammo UTD.  (last Pap: 16, NILM, HPV negative  Mammo 21, scheduled 5/15/25   Dexa 24  Colonoscopy 19, repeat 5 years )         HPI: Rosa Resendiz is a 59 y.o.  who presents for annual gynecologic examination.  She has the following concerns:  pain with exam.       Health Maintenance:    Exercise: frequently, hit a blip over the holiday   Breast exams/breast awareness: yes  Last mammogram:   Colorectal cancer screenin - 5 year f/u   DEXA     Past Medical History:   Diagnosis Date    Abscess of back     Resolved 2018     Adverse reaction to statin medication     Resolved 2018     Alcoholism (HCC)     AGE 26    Anesthesia complication     extreme dizziness upon awakening    Anxiety     Asthma     BRCA1 negative     thru 23&Me    Chronic pain disorder      rt side pinched nerve    Colon polyp     Depression     Edema     GERD (gastroesophageal reflux disease)     Glaucoma suspect 2023    Hyperlipidemia     Hypertension     Irregular heart beat     palpitations    Irritable bowel syndrome     Liver disease     fatty    Neck pain     Pinched nerve     Last assessed 2017     PONV  (postoperative nausea and vomiting)     Tuberculosis            Past Surgical History:   Procedure Laterality Date    CARDIAC CATHETERIZATION      due to brothers advanced heart disease and abnormal stress test    COLONOSCOPY N/A 2017    Procedure: COLONOSCOPY;  Surgeon: Garcia Spivey MD;  Location: [facility];  Service: Gastroenterology    COLONOSCOPY      ELBOW SURGERY Right     tendon repair, post op nausea and dizziness    ESOPHAGOGASTRODUODENOSCOPY N/A 2017    Procedure: ESOPHAGOGASTRODUODENOSCOPY (EGD);  Surgeon: Garcia Spivey MD;  Location: [facility];  Service: Gastroenterology    ESOPHAGOGASTRODUODENOSCOPY      Diagnostic     SKIN BIOPSY      Last assessed 2017     UPPER GASTROINTESTINAL ENDOSCOPY      WISDOM TOOTH EXTRACTION         Past OB/Gyn History:   No LMP recorded (lmp unknown). Patient is postmenopausal.    Menopausal status: postmenopausal  Menopausal symptoms:  chin hairs     Last Pap: 2018 : no abnormalities  History of abnormal Pap smear: no    Patient is not currently sexually active.     Family History  Family History   Problem Relation Age of Onset    Alzheimer's disease Mother     Thyroid disease Mother             Edema Mother     Osteoarthritis Mother         Knee    Arthritis Mother             Stroke Father             Hypertension Father             Hyperlipidemia Father     Arthritis Father             Coronary artery disease Father     Hypothyroidism Father     Other Father         status post coronary artery bypass graft     Stroke Sister     Heart failure Sister     Heart disease Sister     Drug abuse Brother     Completed Suicide  Brother     No Known Problems Maternal Grandmother     No Known Problems Maternal Grandfather     No Known Problems Paternal Grandmother     No Known Problems Paternal Grandfather     Breast cancer Paternal Aunt 50    Colon cancer Maternal Uncle 75    No Known Problems Maternal  Aunt     No Known Problems Maternal Aunt     Heart disease Brother                Family history of uterine or ovarian cancer: no  Family history of breast cancer: yes  Family history of colon cancer: yes    Social History:  Social History     Socioeconomic History    Marital status: Single     Spouse name: Not on file    Number of children: Not on file    Years of education: Not on file    Highest education level: Not on file   Occupational History    Not on file   Tobacco Use    Smoking status: Former     Current packs/day: 0.00     Average packs/day: 1.1 packs/day for 12.3 years (13.5 ttl pk-yrs)     Types: Cigarettes     Start date:      Quit date:      Years since quittin.5    Smokeless tobacco: Never   Vaping Use    Vaping status: Never Used   Substance and Sexual Activity    Alcohol use: Not Currently     Comment: Sober since 88    Drug use: Not Currently     Comment: Clean and sober since 88    Sexual activity: Not Currently     Partners: Male     Birth control/protection: Abstinence   Other Topics Concern    Not on file   Social History Narrative    Not on file     Social Determinants of Health     Financial Resource Strain: Not on file   Food Insecurity: Not on file   Transportation Needs: Not on file   Physical Activity: Not on file   Stress: Not on file   Social Connections: Not on file   Intimate Partner Violence: Not on file   Housing Stability: Not on file     Domestic violence screen: negative    Allergies:  Allergies   Allergen Reactions    Banana - Food Allergy GI Intolerance    Statins Arthralgia    Fish Oil [Omega-3 Fatty Acids] Myalgia     Lovaza    Zetia [Ezetimibe] Myalgia       Medications:    Current Outpatient Medications:     acetaminophen (TYLENOL) 650 mg CR tablet, Take 650 mg by mouth every 8 (eight) hours as needed for mild pain, Disp: , Rfl:     Alpha-Lipoic Acid 600 MG CAPS, Take by mouth in the morning, Disp: , Rfl:     Calcium Carbonate-Vitamin D  "(CALCIUM 600+D HIGH POTENCY PO), Take by mouth daily, Disp: , Rfl:     Cholecalciferol (VITAMIN D3) 5000 units CAPS, Take by mouth daily, Disp: , Rfl:     famotidine (PEPCID) 40 MG tablet, Take 1 tablet by mouth daily as needed, Disp: , Rfl:     fenofibrate micronized (LOFIBRA) 134 MG capsule, Take 1 capsule (134 mg total) by mouth daily with breakfast, Disp: 90 capsule, Rfl: 1    Flaxseed, Linseed, (FLAX SEED OIL) 1000 MG CAPS, Take 2 capsules by mouth daily , Disp: , Rfl:     ibuprofen (MOTRIN) 200 mg tablet, Take by mouth every 6 (six) hours as needed for mild pain , Disp: , Rfl:     losartan-hydrochlorothiazide (HYZAAR) 50-12.5 mg per tablet, Take 1 tablet by mouth daily, Disp: 90 tablet, Rfl: 0    meloxicam (Mobic) 7.5 mg tablet, Take 1 tablet (7.5 mg total) by mouth daily (Patient taking differently: Take 7.5 mg by mouth as needed for mild pain or moderate pain), Disp: 90 tablet, Rfl: 0    lidocaine (XYLOCAINE) 5 % ointment, Apply topically 2 (two) times a day as needed for mild pain (for bilateral knee pain) (Patient not taking: Reported on 7/10/2024), Disp: 50 g, Rfl: 1    Review of Systems:  Review of Systems   Constitutional:  Negative for chills and fever.   Respiratory:  Negative for shortness of breath.    Cardiovascular:  Negative for chest pain.   Gastrointestinal:  Positive for abdominal distention (carbs?). Negative for abdominal pain, blood in stool, constipation, nausea and vomiting.   Genitourinary:  Negative for difficulty urinating, dysuria, frequency, pelvic pain, urgency, vaginal bleeding, vaginal discharge and vaginal pain.         Physical Exam:  /80 (BP Location: Left arm, Patient Position: Sitting, Cuff Size: Standard)   Ht 5' 0.63\" (1.54 m)   Wt 80.7 kg (178 lb)   LMP  (LMP Unknown)   BMI 34.04 kg/m²    Physical Exam  Constitutional:       General: She is awake.      Appearance: Normal appearance. She is well-developed.   Genitourinary:      Vulva, bladder and urethral meatus " normal.      Right Labia: No rash, tenderness or lesions.     Left Labia: No tenderness, lesions or rash.     No labial fusion noted.      No vaginal discharge, erythema, tenderness or bleeding.      No vaginal prolapse present.     Moderate vaginal atrophy present.       Right Adnexa: not tender, not full and no mass present.     Left Adnexa: not tender, not full and no mass present.     Cervix is nulliparous.      No cervical motion tenderness, discharge, lesion or polyp.      Uterus is not enlarged, tender or irregular.      No uterine mass detected.     No urethral prolapse present.      Bladder is not tender.       Pelvic exam was performed with patient in the lithotomy position.   Breasts:     Right: No inverted nipple, mass, nipple discharge, skin change or tenderness.      Left: No inverted nipple, mass, nipple discharge, skin change or tenderness.   HENT:      Head: Normocephalic and atraumatic.   Cardiovascular:      Rate and Rhythm: Normal rate and regular rhythm.      Heart sounds: Normal heart sounds.   Pulmonary:      Effort: Pulmonary effort is normal. No tachypnea or respiratory distress.      Breath sounds: Normal breath sounds.   Abdominal:      General: Abdomen is flat. There is no distension.      Palpations: Abdomen is soft.      Tenderness: There is no abdominal tenderness. There is no guarding or rebound.   Musculoskeletal:      Cervical back: Neck supple.   Lymphadenopathy:      Upper Body:      Right upper body: No supraclavicular or axillary adenopathy.      Left upper body: No supraclavicular or axillary adenopathy.   Neurological:      General: No focal deficit present.      Mental Status: She is alert and oriented to person, place, and time.   Psychiatric:         Mood and Affect: Mood normal.         Behavior: Behavior normal.         Thought Content: Thought content normal.         Judgment: Judgment normal.   Vitals reviewed.

## 2024-07-11 LAB
HPV HR 12 DNA CVX QL NAA+PROBE: NEGATIVE
HPV16 DNA CVX QL NAA+PROBE: NEGATIVE
HPV18 DNA CVX QL NAA+PROBE: NEGATIVE

## 2024-07-17 LAB
LAB AP GYN PRIMARY INTERPRETATION: NORMAL
Lab: NORMAL

## 2024-07-21 ENCOUNTER — APPOINTMENT (OUTPATIENT)
Dept: LAB | Age: 59
End: 2024-07-21
Payer: COMMERCIAL

## 2024-07-21 DIAGNOSIS — E78.2 HYPERLIPEMIA, MIXED: ICD-10-CM

## 2024-07-21 LAB
ALBUMIN SERPL BCG-MCNC: 4.2 G/DL (ref 3.5–5)
ALP SERPL-CCNC: 32 U/L (ref 34–104)
ALT SERPL W P-5'-P-CCNC: 29 U/L (ref 7–52)
ANION GAP SERPL CALCULATED.3IONS-SCNC: 10 MMOL/L (ref 4–13)
AST SERPL W P-5'-P-CCNC: 22 U/L (ref 13–39)
BILIRUB SERPL-MCNC: 0.68 MG/DL (ref 0.2–1)
BUN SERPL-MCNC: 14 MG/DL (ref 5–25)
CALCIUM SERPL-MCNC: 9.5 MG/DL (ref 8.4–10.2)
CHLORIDE SERPL-SCNC: 104 MMOL/L (ref 96–108)
CHOLEST SERPL-MCNC: 193 MG/DL
CO2 SERPL-SCNC: 30 MMOL/L (ref 21–32)
CREAT SERPL-MCNC: 0.78 MG/DL (ref 0.6–1.3)
GFR SERPL CREATININE-BSD FRML MDRD: 82 ML/MIN/1.73SQ M
GLUCOSE P FAST SERPL-MCNC: 84 MG/DL (ref 65–99)
HDLC SERPL-MCNC: 46 MG/DL
LDLC SERPL CALC-MCNC: 113 MG/DL (ref 0–100)
NONHDLC SERPL-MCNC: 147 MG/DL
POTASSIUM SERPL-SCNC: 3.5 MMOL/L (ref 3.5–5.3)
PROT SERPL-MCNC: 6.7 G/DL (ref 6.4–8.4)
SODIUM SERPL-SCNC: 144 MMOL/L (ref 135–147)
TRIGL SERPL-MCNC: 168 MG/DL

## 2024-07-21 PROCEDURE — 36415 COLL VENOUS BLD VENIPUNCTURE: CPT

## 2024-07-21 PROCEDURE — 80053 COMPREHEN METABOLIC PANEL: CPT

## 2024-07-27 ENCOUNTER — APPOINTMENT (EMERGENCY)
Dept: RADIOLOGY | Facility: HOSPITAL | Age: 59
End: 2024-07-27
Payer: COMMERCIAL

## 2024-07-27 ENCOUNTER — HOSPITAL ENCOUNTER (EMERGENCY)
Facility: HOSPITAL | Age: 59
Discharge: HOME | End: 2024-07-27
Attending: EMERGENCY MEDICINE
Payer: COMMERCIAL

## 2024-07-27 VITALS
HEART RATE: 100 BPM | OXYGEN SATURATION: 92 % | SYSTOLIC BLOOD PRESSURE: 150 MMHG | DIASTOLIC BLOOD PRESSURE: 82 MMHG | TEMPERATURE: 100.5 F | RESPIRATION RATE: 20 BRPM

## 2024-07-27 DIAGNOSIS — U07.1 COVID-19: Primary | ICD-10-CM

## 2024-07-27 LAB
2HR DELTA HS TROPONIN: 0 NG/L
ANION GAP SERPL CALCULATED.3IONS-SCNC: 8 MMOL/L (ref 4–13)
BASOPHILS # BLD AUTO: 0.02 THOUSANDS/ÂΜL (ref 0–0.1)
BASOPHILS NFR BLD AUTO: 0 % (ref 0–1)
BUN SERPL-MCNC: 11 MG/DL (ref 5–25)
CALCIUM SERPL-MCNC: 9.3 MG/DL (ref 8.4–10.2)
CARDIAC TROPONIN I PNL SERPL HS: 7 NG/L
CARDIAC TROPONIN I PNL SERPL HS: 7 NG/L
CHLORIDE SERPL-SCNC: 104 MMOL/L (ref 96–108)
CO2 SERPL-SCNC: 27 MMOL/L (ref 21–32)
CREAT SERPL-MCNC: 0.96 MG/DL (ref 0.6–1.3)
EOSINOPHIL # BLD AUTO: 0.03 THOUSAND/ÂΜL (ref 0–0.61)
EOSINOPHIL NFR BLD AUTO: 1 % (ref 0–6)
ERYTHROCYTE [DISTWIDTH] IN BLOOD BY AUTOMATED COUNT: 13.1 % (ref 11.6–15.1)
FLUAV RNA RESP QL NAA+PROBE: NEGATIVE
FLUBV RNA RESP QL NAA+PROBE: NEGATIVE
GFR SERPL CREATININE-BSD FRML MDRD: 64 ML/MIN/1.73SQ M
GLUCOSE SERPL-MCNC: 99 MG/DL (ref 65–140)
HCT VFR BLD AUTO: 40.6 % (ref 34.8–46.1)
HGB BLD-MCNC: 13.5 G/DL (ref 11.5–15.4)
IMM GRANULOCYTES # BLD AUTO: 0.01 THOUSAND/UL (ref 0–0.2)
IMM GRANULOCYTES NFR BLD AUTO: 0 % (ref 0–2)
LYMPHOCYTES # BLD AUTO: 0.68 THOUSANDS/ÂΜL (ref 0.6–4.47)
LYMPHOCYTES NFR BLD AUTO: 15 % (ref 14–44)
MCH RBC QN AUTO: 30 PG (ref 26.8–34.3)
MCHC RBC AUTO-ENTMCNC: 33.3 G/DL (ref 31.4–37.4)
MCV RBC AUTO: 90 FL (ref 82–98)
MONOCYTES # BLD AUTO: 1.08 THOUSAND/ÂΜL (ref 0.17–1.22)
MONOCYTES NFR BLD AUTO: 24 % (ref 4–12)
NEUTROPHILS # BLD AUTO: 2.73 THOUSANDS/ÂΜL (ref 1.85–7.62)
NEUTS SEG NFR BLD AUTO: 60 % (ref 43–75)
NRBC BLD AUTO-RTO: 0 /100 WBCS
PLATELET # BLD AUTO: 220 THOUSANDS/UL (ref 149–390)
PMV BLD AUTO: 10.7 FL (ref 8.9–12.7)
POTASSIUM SERPL-SCNC: 3.7 MMOL/L (ref 3.5–5.3)
RBC # BLD AUTO: 4.5 MILLION/UL (ref 3.81–5.12)
RSV RNA RESP QL NAA+PROBE: NEGATIVE
SARS-COV-2 RNA RESP QL NAA+PROBE: POSITIVE
SODIUM SERPL-SCNC: 139 MMOL/L (ref 135–147)
WBC # BLD AUTO: 4.55 THOUSAND/UL (ref 4.31–10.16)

## 2024-07-27 PROCEDURE — 85025 COMPLETE CBC W/AUTO DIFF WBC: CPT

## 2024-07-27 PROCEDURE — 99285 EMERGENCY DEPT VISIT HI MDM: CPT | Performed by: EMERGENCY MEDICINE

## 2024-07-27 PROCEDURE — 99283 EMERGENCY DEPT VISIT LOW MDM: CPT

## 2024-07-27 PROCEDURE — 93005 ELECTROCARDIOGRAM TRACING: CPT

## 2024-07-27 PROCEDURE — 36415 COLL VENOUS BLD VENIPUNCTURE: CPT

## 2024-07-27 PROCEDURE — 84484 ASSAY OF TROPONIN QUANT: CPT

## 2024-07-27 PROCEDURE — 0241U HB NFCT DS VIR RESP RNA 4 TRGT: CPT

## 2024-07-27 PROCEDURE — 94640 AIRWAY INHALATION TREATMENT: CPT

## 2024-07-27 PROCEDURE — 71046 X-RAY EXAM CHEST 2 VIEWS: CPT

## 2024-07-27 PROCEDURE — 96365 THER/PROPH/DIAG IV INF INIT: CPT

## 2024-07-27 PROCEDURE — 80048 BASIC METABOLIC PNL TOTAL CA: CPT

## 2024-07-27 PROCEDURE — 96375 TX/PRO/DX INJ NEW DRUG ADDON: CPT

## 2024-07-27 RX ORDER — ONDANSETRON 2 MG/ML
4 INJECTION INTRAMUSCULAR; INTRAVENOUS ONCE
Status: COMPLETED | OUTPATIENT
Start: 2024-07-27 | End: 2024-07-27

## 2024-07-27 RX ORDER — ALBUTEROL SULFATE 2.5 MG/3ML
5 SOLUTION RESPIRATORY (INHALATION) ONCE
Status: COMPLETED | OUTPATIENT
Start: 2024-07-27 | End: 2024-07-27

## 2024-07-27 RX ORDER — ALBUTEROL SULFATE 90 UG/1
2 AEROSOL, METERED RESPIRATORY (INHALATION) EVERY 6 HOURS PRN
Qty: 18 G | Refills: 0 | Status: SHIPPED | OUTPATIENT
Start: 2024-07-27

## 2024-07-27 RX ORDER — DEXAMETHASONE SODIUM PHOSPHATE 10 MG/ML
10 INJECTION, SOLUTION INTRAMUSCULAR; INTRAVENOUS ONCE
Status: COMPLETED | OUTPATIENT
Start: 2024-07-27 | End: 2024-07-27

## 2024-07-27 RX ORDER — SODIUM CHLORIDE, SODIUM GLUCONATE, SODIUM ACETATE, POTASSIUM CHLORIDE, MAGNESIUM CHLORIDE, SODIUM PHOSPHATE, DIBASIC, AND POTASSIUM PHOSPHATE .53; .5; .37; .037; .03; .012; .00082 G/100ML; G/100ML; G/100ML; G/100ML; G/100ML; G/100ML; G/100ML
500 INJECTION, SOLUTION INTRAVENOUS ONCE
Status: COMPLETED | OUTPATIENT
Start: 2024-07-27 | End: 2024-07-27

## 2024-07-27 RX ORDER — ALBUTEROL SULFATE 90 UG/1
2 AEROSOL, METERED RESPIRATORY (INHALATION) ONCE
Status: DISCONTINUED | OUTPATIENT
Start: 2024-07-27 | End: 2024-07-27

## 2024-07-27 RX ORDER — ACETAMINOPHEN 325 MG/1
650 TABLET ORAL ONCE
Status: COMPLETED | OUTPATIENT
Start: 2024-07-27 | End: 2024-07-27

## 2024-07-27 RX ADMIN — ACETAMINOPHEN 650 MG: 325 TABLET, FILM COATED ORAL at 06:53

## 2024-07-27 RX ADMIN — ALBUTEROL SULFATE 5 MG: 2.5 SOLUTION RESPIRATORY (INHALATION) at 08:56

## 2024-07-27 RX ADMIN — DEXAMETHASONE SODIUM PHOSPHATE 10 MG: 10 INJECTION, SOLUTION INTRAMUSCULAR; INTRAVENOUS at 09:23

## 2024-07-27 RX ADMIN — ONDANSETRON 4 MG: 2 INJECTION INTRAMUSCULAR; INTRAVENOUS at 08:00

## 2024-07-27 RX ADMIN — SODIUM CHLORIDE, SODIUM GLUCONATE, SODIUM ACETATE, POTASSIUM CHLORIDE, MAGNESIUM CHLORIDE, SODIUM PHOSPHATE, DIBASIC, AND POTASSIUM PHOSPHATE 500 ML: .53; .5; .37; .037; .03; .012; .00082 INJECTION, SOLUTION INTRAVENOUS at 08:56

## 2024-07-27 NOTE — DISCHARGE INSTRUCTIONS
You were seen in the emergency department for cough and shortness of breath.  You have COVID.    Please make sure to stay well-hydrated.  Please return to ED if you become extremely short of breath, have chest pain, unilateral leg swelling or other concerning symptoms

## 2024-07-27 NOTE — ED PROVIDER NOTES
History  Chief Complaint   Patient presents with    Cough     Per pt since Thursday, started w/ a cough, joint pain, wheezing and fevers. States she thinks it may be pneumonia. Pt states took x2 covid test at home and were negative.      HPI  Patient is 59-year-old female with past medical history of hypertension, hyperlipidemia, history of TB in 1986 presenting to ED for evaluation of cough and fever.  Patient has been coughing for the last 3 days with intermittent fevers max temp 102.  Today pulse ox dropped to 89%.  Also endorses intermittent pleuritic chest pain, myalgias and nausea.  Denies vomiting, abdominal pain, urinary complaints, leg pain or swelling.  Denies recent long plane/car travel, recent surgeries.  No history of DVT or PE.  Prior to Admission Medications   Prescriptions Last Dose Informant Patient Reported? Taking?   Alpha-Lipoic Acid 600 MG CAPS  Self Yes No   Sig: Take by mouth in the morning   Calcium Carbonate-Vitamin D (CALCIUM 600+D HIGH POTENCY PO)  Self Yes No   Sig: Take by mouth daily   Cholecalciferol (VITAMIN D3) 5000 units CAPS  Self Yes No   Sig: Take by mouth daily   Flaxseed, Linseed, (FLAX SEED OIL) 1000 MG CAPS  Self Yes No   Sig: Take 2 capsules by mouth daily    acetaminophen (TYLENOL) 650 mg CR tablet  Self Yes No   Sig: Take 650 mg by mouth every 8 (eight) hours as needed for mild pain   famotidine (PEPCID) 40 MG tablet  Self Yes No   Sig: Take 1 tablet by mouth daily as needed   fenofibrate micronized (LOFIBRA) 134 MG capsule  Self No No   Sig: Take 1 capsule (134 mg total) by mouth daily with breakfast   ibuprofen (MOTRIN) 200 mg tablet  Self Yes No   Sig: Take by mouth every 6 (six) hours as needed for mild pain    lidocaine (XYLOCAINE) 5 % ointment  Self No No   Sig: Apply topically 2 (two) times a day as needed for mild pain (for bilateral knee pain)   Patient not taking: Reported on 7/10/2024   losartan-hydrochlorothiazide (HYZAAR) 50-12.5 mg per tablet  Self No No  Spoke c Ayush, [phone]. R/s peer to peer today at 1515, Dr. Phillips,  224 area code. Provided my cell #.      Sig: Take 1 tablet by mouth daily   meloxicam (Mobic) 7.5 mg tablet  Self No No   Sig: Take 1 tablet (7.5 mg total) by mouth daily   Patient taking differently: Take 7.5 mg by mouth as needed for mild pain or moderate pain      Facility-Administered Medications: None       Past Medical History:   Diagnosis Date    Abscess of back     Resolved 2018     Adverse reaction to statin medication     Resolved 2018     Alcoholism (HCC)     AGE 26    Anesthesia complication     extreme dizziness upon awakening    Anxiety     Asthma     BRCA1 negative     thru 23&Me    Chronic pain disorder      rt side pinched nerve    Colon polyp     Depression     Edema     GERD (gastroesophageal reflux disease)     Glaucoma suspect 2023    Hyperlipidemia     Hypertension     Irregular heart beat     palpitations    Irritable bowel syndrome     Liver disease     fatty    Neck pain     Pinched nerve     Last assessed 2017     PONV (postoperative nausea and vomiting)     Tuberculosis            Past Surgical History:   Procedure Laterality Date    CARDIAC CATHETERIZATION      due to brothers advanced heart disease and abnormal stress test    COLONOSCOPY N/A 2017    Procedure: COLONOSCOPY;  Surgeon: Garcia Spivey MD;  Location: [facility];  Service: Gastroenterology    COLONOSCOPY      ELBOW SURGERY Right     tendon repair, post op nausea and dizziness    ESOPHAGOGASTRODUODENOSCOPY N/A 2017    Procedure: ESOPHAGOGASTRODUODENOSCOPY (EGD);  Surgeon: Garcia Spivey MD;  Location: [facility];  Service: Gastroenterology    ESOPHAGOGASTRODUODENOSCOPY      Diagnostic     SKIN BIOPSY      Last assessed 2017     UPPER GASTROINTESTINAL ENDOSCOPY      WISDOM TOOTH EXTRACTION         Family History   Problem Relation Age of Onset    Alzheimer's disease Mother     Thyroid disease Mother             Edema Mother     Osteoarthritis Mother         Knee    Arthritis Mother              Stroke Father             Hypertension Father             Hyperlipidemia Father     Arthritis Father             Coronary artery disease Father     Hypothyroidism Father     Other Father         status post coronary artery bypass graft     Stroke Sister     Heart failure Sister     Heart disease Sister     Drug abuse Brother     Completed Suicide  Brother     No Known Problems Maternal Grandmother     No Known Problems Maternal Grandfather     No Known Problems Paternal Grandmother     No Known Problems Paternal Grandfather     Breast cancer Paternal Aunt 50    Colon cancer Maternal Uncle 75    No Known Problems Maternal Aunt     No Known Problems Maternal Aunt     Heart disease Brother              I have reviewed and agree with the history as documented.    E-Cigarette/Vaping    E-Cigarette Use Never User      E-Cigarette/Vaping Substances    Nicotine No     THC No     CBD No     Flavoring No     Other No     Unknown No      Social History     Tobacco Use    Smoking status: Former     Current packs/day: 0.00     Average packs/day: 1.1 packs/day for 12.3 years (13.5 ttl pk-yrs)     Types: Cigarettes     Start date:      Quit date:      Years since quittin.5    Smokeless tobacco: Never   Vaping Use    Vaping status: Never Used   Substance Use Topics    Alcohol use: Not Currently     Comment: Sober since 88    Drug use: Not Currently     Comment: Clean and sober since 88        Review of Systems   Constitutional:  Positive for fever. Negative for chills.   HENT:  Negative for ear pain and sore throat.    Respiratory:  Positive for cough and shortness of breath.    Cardiovascular:  Positive for chest pain. Negative for palpitations and leg swelling.   Gastrointestinal:  Positive for nausea. Negative for abdominal pain, diarrhea and vomiting.   Genitourinary:  Negative for dysuria, frequency and hematuria.   Musculoskeletal:  Negative for back pain and neck pain.    Skin:  Negative for rash.   Neurological:  Negative for dizziness, light-headedness and headaches.       Physical Exam  ED Triage Vitals [07/27/24 0607]   Temperature Pulse Respirations Blood Pressure SpO2   100.5 °F (38.1 °C) (!) 110 (!) 118 146/92 96 %      Temp Source Heart Rate Source Patient Position - Orthostatic VS BP Location FiO2 (%)   Oral Monitor Lying Right arm --      Pain Score       5             Orthostatic Vital Signs  Vitals:    07/27/24 0607 07/27/24 0630   BP: 146/92 150/82   Pulse: (!) 110 100   Patient Position - Orthostatic VS: Lying        Physical Exam  Vitals reviewed.   Constitutional:       General: She is awake.      Comments: Intermittent cough   HENT:      Head: Normocephalic and atraumatic.      Mouth/Throat:      Mouth: Mucous membranes are moist.   Eyes:      Extraocular Movements: Extraocular movements intact.      Right eye: No nystagmus.      Left eye: No nystagmus.      Conjunctiva/sclera: Conjunctivae normal.      Pupils: Pupils are equal, round, and reactive to light.   Cardiovascular:      Rate and Rhythm: Regular rhythm. Tachycardia present.      Pulses: Normal pulses.      Heart sounds: Normal heart sounds, S1 normal and S2 normal. Heart sounds not distant. No murmur heard.     No friction rub. No gallop.   Pulmonary:      Breath sounds: No stridor. No wheezing, rhonchi or rales.      Comments: CTA b/l   Abdominal:      General: Bowel sounds are normal.      Palpations: Abdomen is soft.      Tenderness: There is no abdominal tenderness.   Musculoskeletal:      Right lower leg: No edema.      Left lower leg: No edema.   Skin:     General: Skin is warm and dry.      Capillary Refill: Capillary refill takes less than 2 seconds.   Neurological:      Mental Status: She is alert and oriented to person, place, and time.      GCS: GCS eye subscore is 4. GCS verbal subscore is 5. GCS motor subscore is 6.      Cranial Nerves: Cranial nerves 2-12 are intact.      Sensory: Sensation  is intact.      Motor: No weakness or pronator drift.      Coordination: Coordination normal. Finger-Nose-Finger Test normal.         ED Medications  Medications   multi-electrolyte (ISOLYTE-S PH 7.4) bolus 500 mL (500 mL Intravenous New Bag 7/27/24 0856)   dexamethasone (PF) (DECADRON) injection 10 mg (has no administration in time range)   acetaminophen (TYLENOL) tablet 650 mg (650 mg Oral Given 7/27/24 0653)   ondansetron (ZOFRAN) injection 4 mg (4 mg Intravenous Given 7/27/24 0800)   albuterol inhalation solution 5 mg (5 mg Nebulization Given 7/27/24 0856)       Diagnostic Studies  Results Reviewed       Procedure Component Value Units Date/Time    HS Troponin I 2hr [[id number]] Collected: 07/27/24 0858    Lab Status: In process Specimen: Blood from Arm, Right Updated: 07/27/24 0905    FLU/RSV/COVID - if FLU/RSV clinically relevant [[id number]]  (Abnormal) Collected: 07/27/24 0653    Lab Status: Final result Specimen: Nares from Nose Updated: 07/27/24 0808     SARS-CoV-2 Positive     INFLUENZA A PCR Negative     INFLUENZA B PCR Negative     RSV PCR Negative    Narrative:      FOR PEDIATRIC PATIENTS - copy/paste COVID Guidelines URL to browser: [email or web address]    SARS-CoV-2 assay is a Nucleic Acid Amplification assay intended for the  qualitative detection of nucleic acid from SARS-CoV-2 in nasopharyngeal  swabs. Results are for the presumptive identification of SARS-CoV-2 RNA.    Positive results are indicative of infection with SARS-CoV-2, the virus  causing COVID-19, but do not rule out bacterial infection or co-infection  with other viruses. Laboratories within the [place] and its  territories are required to report all positive results to the appropriate  public health authorities. Negative results do not preclude SARS-CoV-2  infection and should not be used as the sole basis for treatment or other  patient management decisions. Negative results  must be combined with  clinical observations, patient history, and epidemiological information.  This test has not been FDA cleared or approved.    This test has been authorized by FDA under an Emergency Use Authorization  (EUA). This test is only authorized for the duration of time the  declaration that circumstances exist justifying the authorization of the  emergency use of an in vitro diagnostic tests for detection of SARS-CoV-2  virus and/or diagnosis of COVID-19 infection under section 564(b)(1) of  the Act, 21 U.S.C. 360bbb-3(b)(1), unless the authorization is terminated  or revoked sooner. The test has been validated but independent review by FDA  and CLIA is pending.    Test performed using [employer] GeneXpert: This RT-PCR assay targets N2,  a region unique to SARS-CoV-2. A conserved region in the E-gene was chosen  for pan-Sarbecovirus detection which includes SARS-CoV-2.    According to CMS-2020-01-R, this platform meets the definition of high-throughput technology.    HS Troponin I 4hr [[id number]]     Lab Status: No result Specimen: Blood     HS Troponin 0hr (reflex protocol) [[id number]]  (Normal) Collected: 07/27/24 0653    Lab Status: Final result Specimen: Blood from Arm, Left Updated: 07/27/24 0733     hs TnI 0hr 7 ng/L     Basic metabolic panel [[id number]] Collected: 07/27/24 0653    Lab Status: Final result Specimen: Blood from Arm, Left Updated: 07/27/24 0731     Sodium 139 mmol/L      Potassium 3.7 mmol/L      Chloride 104 mmol/L      CO2 27 mmol/L      ANION GAP 8 mmol/L      BUN 11 mg/dL      Creatinine 0.96 mg/dL      Glucose 99 mg/dL      Calcium 9.3 mg/dL      eGFR 64 ml/min/1.73sq m     Narrative:      National Kidney Disease Foundation guidelines for Chronic Kidney Disease (CKD):     Stage 1 with normal or high GFR (GFR > 90 mL/min/1.73 square meters)    Stage 2 Mild CKD (GFR = 60-89 mL/min/1.73 square meters)    Stage 3A Moderate CKD (GFR = 45-59 mL/min/1.73 square meters)    Stage 3B  Moderate CKD (GFR = 30-44 mL/min/1.73 square meters)    Stage 4 Severe CKD (GFR = 15-29 mL/min/1.73 square meters)    Stage 5 End Stage CKD (GFR <15 mL/min/1.73 square meters)  Note: GFR calculation is accurate only with a steady state creatinine    CBC and differential [[id number]]  (Abnormal) Collected: 07/27/24 0653    Lab Status: Final result Specimen: Blood from Arm, Left Updated: 07/27/24 0711     WBC 4.55 Thousand/uL      RBC 4.50 Million/uL      Hemoglobin 13.5 g/dL      Hematocrit 40.6 %      MCV 90 fL      MCH 30.0 pg      MCHC 33.3 g/dL      RDW 13.1 %      MPV 10.7 fL      Platelets 220 Thousands/uL      nRBC 0 /100 WBCs      Segmented % 60 %      Immature Grans % 0 %      Lymphocytes % 15 %      Monocytes % 24 %      Eosinophils Relative 1 %      Basophils Relative 0 %      Absolute Neutrophils 2.73 Thousands/µL      Absolute Immature Grans 0.01 Thousand/uL      Absolute Lymphocytes 0.68 Thousands/µL      Absolute Monocytes 1.08 Thousand/µL      Eosinophils Absolute 0.03 Thousand/µL      Basophils Absolute 0.02 Thousands/µL                    XR chest 2 views   Final Result by Kailyn Baca MD (07/27 0816)      No acute cardiopulmonary disease.               Workstation performed: [id number]               Procedures  Procedures      ED Course  ED Course as of 07/27/24 0919   Sat Jul 27, 2024 0630 Procedure Note: EKG  Date/Time: 07/27/24 6:31 AM   Interpreted by: AMARI DIAMOND  Indications / Diagnosis: cough  ECG reviewed by me, the ED Provider: yes   The EKG demonstrates:  Rhythm: sinus tachycardia  Intervals: normal intervals  Axis: normal axis  QRS/Blocks: normal QRS  ST Changes: No acute ST Changes, no STD/ISA.  T wave inversion in V2   0756 hs TnI 0hr: 7   0757 WBC: 4.55   0757 Hemoglobin: 13.5   0757 Platelet Count: 220   0757 Basic metabolic panel  Unremarkable    0825 SARS-COV-2(!): Positive                                       Medical Decision Making  Amount and/or Complexity of Data  Reviewed  Labs: ordered. Decision-making details documented in ED Course.  Radiology: ordered.    Risk  OTC drugs.  Prescription drug management.        Patient is 59 y.o. female with PMH of hypertension, hyperlipidemia, history of TB in 1986 presenting to ED for evaluation of cough and fever.. See history and physical documented above.     Differential diagnosis included but not limited to viral syndrome, ACS, pneumonia, doubt DVT/PE given Wells low, cannot PERC out due to age but low clinical suspicion. Plan CBC, CMP, troponin, EKG, chest x-ray, viral testing.  Will give Tylenol.    View ED course above for further discussion on patient workup.       All labs reviewed and utilized in the medical decision making process  All radiology studies independently viewed by me and interpreted by the radiologist.  I reviewed all testing with the patient.     Upon re-evaluation notified of positive COVID test.  Patient would like to go home.  Patient stable for outpatient management.  Strict return precautions given including severe shortness of breath, chest pain, inability tolerate p.o. intake.    I have reviewed the patient's vital signs, nursing notes, and other relevant tests/information. I had a detailed discussion with the patient regarding the history, exam findings, and any diagnostic results.   Plan to discharge home in stable condition with covid-19, follow up with PCP.  Discussed with patient who is agreeable to plan.  I discussed discharge instructions, need for follow-up, and oral return precautions for what to return for in addition to the written return precautions and discharge instructions, specifically highlighting areas of special concern.  The patient verbalized understanding of the discharge instructions and warnings that would necessitate return to the Emergency Department including evere shortness of breath, chest pain, inability tolerate p.o. intake  .  All questions the patient had were answered  prior to discharge to the best of my ability.       Disposition  Final diagnoses:   COVID-19     Time reflects when diagnosis was documented in both MDM as applicable and the Disposition within this note       Time User Action Codes Description Comment    7/27/2024  8:35 AM Trina Niño Add [U07.1] COVID-19           ED Disposition       ED Disposition   Discharge    Condition   Stable    Date/Time   Sat Jul 27, 2024  8:35 AM    Comment   Rosa Resendiz discharge to home/self care.                   Follow-up Information    None         Patient's Medications   Discharge Prescriptions    No medications on file     No discharge procedures on file.    PDMP Review       None             ED Provider  Attending physically available and evaluated Rosa Resendiz. I managed the patient along with the ED Attending.    Electronically Signed by           Trina Niño DO  07/27/24 0995

## 2024-07-27 NOTE — ED ATTENDING ATTESTATION
7/27/2024  I, Adrian Jamison MD, saw and evaluated the patient. I have discussed the patient with the resident/non-physician practitioner and agree with the resident's/non-physician practitioner's findings, Plan of Care, and MDM as documented in the resident's/non-physician practitioner's note, except where noted. All available labs and Radiology studies were reviewed.  I was present for key portions of any procedure(s) performed by the resident/non-physician practitioner and I was immediately available to provide assistance.       At this point I agree with the current assessment done in the Emergency Department.  I have conducted an independent evaluation of this patient a history and physical is as follows:    ED Course     59-year-old female presents with 3-day history of fever cough congestion.  Remote history of TB (1986).  Patient denies any recent sick contacts.  Last antipyretic dosage last night.    Vitals reviewed  Nontoxic no acute distress.  Heart regular rate and rhythm without murmurs.  Lungs clear auscultation bilateral.  Ab soft nontender nondistended normal bowel sounds.  Extremities no edema.    Impression: Cough  Differential diagnosis viral syndrome, URI, at risk for pneumonia, doubt PE, doubt ACS MI arrhythmia,    Plan to check ECG chest x-ray CBC CMP troponin flu COVID RSV     IV fluids antipyretics reassess    Labs reviewed patient COVID-positive.  Plan for discharge with supportive care at home return precautions given          Critical Care Time  Procedures

## 2024-07-27 NOTE — Clinical Note
Rosa Resendiz was seen and treated in our emergency department on 7/27/2024.    No restrictions            Diagnosis:     Rosa  may return to work on return date.    She may return on this date:          If you have any questions or concerns, please don't hesitate to call.      Trina Niño, DO    ______________________________           _______________          _______________  Hospital Representative                              Date                                Time

## 2024-07-28 LAB
ATRIAL RATE: 107 BPM
P AXIS: 66 DEGREES
PR INTERVAL: 138 MS
QRS AXIS: 61 DEGREES
QRSD INTERVAL: 70 MS
QT INTERVAL: 334 MS
QTC INTERVAL: 445 MS
T WAVE AXIS: 24 DEGREES
VENTRICULAR RATE: 107 BPM

## 2024-07-28 PROCEDURE — 93010 ELECTROCARDIOGRAM REPORT: CPT | Performed by: INTERNAL MEDICINE

## 2024-08-17 ENCOUNTER — OFFICE VISIT (OUTPATIENT)
Dept: URGENT CARE | Age: 59
End: 2024-08-17
Payer: COMMERCIAL

## 2024-08-17 ENCOUNTER — APPOINTMENT (OUTPATIENT)
Dept: RADIOLOGY | Age: 59
End: 2024-08-17
Payer: COMMERCIAL

## 2024-08-17 VITALS
TEMPERATURE: 97.9 F | RESPIRATION RATE: 18 BRPM | DIASTOLIC BLOOD PRESSURE: 91 MMHG | OXYGEN SATURATION: 96 % | HEART RATE: 96 BPM | SYSTOLIC BLOOD PRESSURE: 116 MMHG

## 2024-08-17 DIAGNOSIS — R05.1 ACUTE COUGH: Primary | ICD-10-CM

## 2024-08-17 DIAGNOSIS — R05.1 ACUTE COUGH: ICD-10-CM

## 2024-08-17 DIAGNOSIS — J20.9 ACUTE BRONCHITIS, UNSPECIFIED ORGANISM: ICD-10-CM

## 2024-08-17 PROCEDURE — 93005 ELECTROCARDIOGRAM TRACING: CPT

## 2024-08-17 PROCEDURE — 99215 OFFICE O/P EST HI 40 MIN: CPT

## 2024-08-17 PROCEDURE — 71046 X-RAY EXAM CHEST 2 VIEWS: CPT

## 2024-08-17 RX ORDER — AZITHROMYCIN 250 MG/1
TABLET, FILM COATED ORAL
Qty: 6 TABLET | Refills: 0 | Status: SHIPPED | OUTPATIENT
Start: 2024-08-17 | End: 2024-08-21

## 2024-08-17 RX ORDER — PREDNISONE 20 MG/1
50 TABLET ORAL DAILY
Qty: 13 TABLET | Refills: 0 | Status: SHIPPED | OUTPATIENT
Start: 2024-08-17 | End: 2024-08-22

## 2024-08-17 NOTE — PROGRESS NOTES
[facility]        NAME: Rosa Resendiz is a 59 y.o. female  : 1965    MRN: [id number]  DATE: 2024  TIME: 3:42 PM    Assessment and Plan   Acute cough [R05.1]  1. Acute cough  XR chest pa & lateral    ECG 12 lead    azithromycin (ZITHROMAX) 250 mg tablet    predniSONE 20 mg tablet      2. Acute bronchitis, unspecified organism        Take antibiotic- Zithromax as directed.  Recommend daily probiotic while on antibiotic or eat yogurt with live cultures daily while on antibiotic.       You may take Tylenol or Motrin for pain and fever.    Albuterol inhaler for wheezing and shortness of breath.    Prednisone- 5 day course.  Do not take any additional motrin/advil/ibuprofen while on the steriods.  You may taken additional tylenol if needed for pain    Rest, increase fluids, good hand washing.  Please follow up with your family doctor if no improvement in 7-10 days.   Strict ER precautions discussed: To see department if you develop any increased chest pain, shortness of breath, difficulty breathing.  Cannot rule out heart attack here in clinic, would require further evaluation in emergency department.  Patient verbalized understanding.  Will follow-up with PCP as previously scheduled.      Patient Instructions       Follow up with PCP in 3-5 days.  Proceed to  ER if symptoms worsen.    If tests have been performed at [facility], our office will contact you with results if changes need to be made to the care plan discussed with you at the visit.  [facility]    Chief Complaint     Chief Complaint   Patient presents with   • Cough     Patient testing positive for covid 3 weeks ago. Struggling with getting rid of the cough. If she takes a deep breathe she coughs more.          History of Present Illness       Pt is a 59 year old female presenting with cough for 3 weeks since being dx with covid.  No fever or chills.  She's reports chest pain across her  chest, no provoking or alleviating symptoms.   She states when she has a coughing fit, she becomes SOB and is coughing up clear mucous. She has taken albuterol for her symptoms without relief. She denies SOB at rest.     Cough  Associated symptoms include chest pain. Pertinent negatives include no shortness of breath or wheezing.       Review of Systems   Review of Systems   HENT:  Positive for congestion.    Respiratory:  Positive for cough. Negative for chest tightness, shortness of breath, wheezing and stridor.    Cardiovascular:  Positive for chest pain. Negative for palpitations and leg swelling.         Current Medications       Current Outpatient Medications:   •  acetaminophen (TYLENOL) 650 mg CR tablet, Take 650 mg by mouth every 8 (eight) hours as needed for mild pain, Disp: , Rfl:   •  albuterol (Ventolin HFA) 90 mcg/act inhaler, Inhale 2 puffs every 6 (six) hours as needed for wheezing or shortness of breath, Disp: 18 g, Rfl: 0  •  Alpha-Lipoic Acid 600 MG CAPS, Take by mouth in the morning, Disp: , Rfl:   •  azithromycin (ZITHROMAX) 250 mg tablet, Take 2 tablets today then 1 tablet daily x 4 days, Disp: 6 tablet, Rfl: 0  •  Calcium Carbonate-Vitamin D (CALCIUM 600+D HIGH POTENCY PO), Take by mouth daily, Disp: , Rfl:   •  Cholecalciferol (VITAMIN D3) 5000 units CAPS, Take by mouth daily, Disp: , Rfl:   •  famotidine (PEPCID) 40 MG tablet, Take 1 tablet by mouth daily as needed, Disp: , Rfl:   •  fenofibrate micronized (LOFIBRA) 134 MG capsule, Take 1 capsule (134 mg total) by mouth daily with breakfast, Disp: 90 capsule, Rfl: 1  •  Flaxseed, Linseed, (FLAX SEED OIL) 1000 MG CAPS, Take 2 capsules by mouth daily , Disp: , Rfl:   •  ibuprofen (MOTRIN) 200 mg tablet, Take by mouth every 6 (six) hours as needed for mild pain , Disp: , Rfl:   •  losartan-hydrochlorothiazide (HYZAAR) 50-12.5 mg per tablet, Take 1 tablet by mouth daily, Disp: 90 tablet, Rfl: 0  •  predniSONE 20 mg tablet, Take 2.5 tablets (50  mg total) by mouth daily for 5 days, Disp: 13 tablet, Rfl: 0  •  lidocaine (XYLOCAINE) 5 % ointment, Apply topically 2 (two) times a day as needed for mild pain (for bilateral knee pain) (Patient not taking: Reported on 7/10/2024), Disp: 50 g, Rfl: 1  •  meloxicam (Mobic) 7.5 mg tablet, Take 1 tablet (7.5 mg total) by mouth daily (Patient not taking: Reported on 8/17/2024), Disp: 90 tablet, Rfl: 0    Current Allergies     Allergies as of 08/17/2024 - Reviewed 08/17/2024   Allergen Reaction Noted   • Banana - food allergy GI Intolerance 09/14/2020   • Statins Arthralgia 03/31/2021   • Fish oil [omega-3 fatty acids] Myalgia 08/31/2022   • Zetia [ezetimibe] Myalgia 08/31/2022            The following portions of the patient's history were reviewed and updated as appropriate: allergies, current medications, past family history, past medical history, past social history, past surgical history and problem list.     Past Medical History:   Diagnosis Date   • Abscess of back     Resolved 1/11/2018    • Adverse reaction to statin medication     Resolved 1/11/2018    • Alcoholism (HCC)     AGE 26   • Anesthesia complication     extreme dizziness upon awakening   • Anxiety    • Asthma    • BRCA1 negative     thru 23&Me   • Chronic pain disorder      rt side pinched nerve   • Colon polyp    • Depression    • Edema    • GERD (gastroesophageal reflux disease)    • Glaucoma suspect 09/28/2023   • Hyperlipidemia    • Hypertension    • Irregular heart beat     palpitations   • Irritable bowel syndrome    • Liver disease     fatty   • Neck pain    • Pinched nerve     Last assessed 9/26/2017    • PONV (postoperative nausea and vomiting)    • Tuberculosis     1986       Past Surgical History:   Procedure Laterality Date   • CARDIAC CATHETERIZATION      due to brothers advanced heart disease and abnormal stress test   • COLONOSCOPY N/A 12/6/2017    Procedure: COLONOSCOPY;  Surgeon: Garcia Spivey MD;  Location: [facility] GI LAB;  Service:  Gastroenterology   • COLONOSCOPY     • ELBOW SURGERY Right     tendon repair, post op nausea and dizziness   • ESOPHAGOGASTRODUODENOSCOPY N/A 2017    Procedure: ESOPHAGOGASTRODUODENOSCOPY (EGD);  Surgeon: Garcia Spivey MD;  Location: [facility] GI LAB;  Service: Gastroenterology   • ESOPHAGOGASTRODUODENOSCOPY      Diagnostic    • SKIN BIOPSY      Last assessed 2017    • UPPER GASTROINTESTINAL ENDOSCOPY     • WISDOM TOOTH EXTRACTION         Family History   Problem Relation Age of Onset   • Alzheimer's disease Mother    • Thyroid disease Mother            • Edema Mother    • Osteoarthritis Mother         Knee   • Arthritis Mother            • Stroke Father            • Hypertension Father            • Hyperlipidemia Father    • Arthritis Father            • Coronary artery disease Father    • Hypothyroidism Father    • Other Father         status post coronary artery bypass graft    • Stroke Sister    • Heart failure Sister    • Heart disease Sister    • Drug abuse Brother    • Completed Suicide  Brother    • No Known Problems Maternal Grandmother    • No Known Problems Maternal Grandfather    • No Known Problems Paternal Grandmother    • No Known Problems Paternal Grandfather    • Breast cancer Paternal Aunt 50   • Colon cancer Maternal Uncle 75   • No Known Problems Maternal Aunt    • No Known Problems Maternal Aunt    • Heart disease Brother                  Medications have been verified.        Objective   /91   Pulse 96   Temp 97.9 °F (36.6 °C)   Resp 18   LMP  (LMP Unknown)   SpO2 96%   No LMP recorded (lmp unknown). Patient is postmenopausal.       Physical Exam     Physical Exam  Vitals and nursing note reviewed.   Constitutional:       General: She is not in acute distress.     Appearance: She is obese. She is ill-appearing.   HENT:      Head: Normocephalic.      Right Ear: Tympanic membrane normal.      Left Ear: Tympanic membrane  normal.      Nose: Congestion and rhinorrhea present.      Mouth/Throat:      Mouth: Mucous membranes are moist.      Pharynx: Posterior oropharyngeal erythema present.   Eyes:      Extraocular Movements: Extraocular movements intact.      Conjunctiva/sclera: Conjunctivae normal.   Cardiovascular:      Rate and Rhythm: Normal rate.      Pulses: Normal pulses.   Pulmonary:      Effort: Pulmonary effort is normal. No respiratory distress.      Breath sounds: Normal breath sounds. No stridor. No wheezing, rhonchi or rales.   Chest:      Chest wall: No tenderness.   Abdominal:      General: Abdomen is flat.   Musculoskeletal:      Cervical back: Normal range of motion.   Skin:     General: Skin is warm.      Capillary Refill: Capillary refill takes less than 2 seconds.   Neurological:      General: No focal deficit present.      Mental Status: She is alert.

## 2024-08-17 NOTE — PATIENT INSTRUCTIONS
Take antibiotic- Zithromax as directed.  Recommend daily probiotic while on antibiotic or eat yogurt with live cultures daily while on antibiotic.       You may take Tylenol or Motrin for pain and fever.    Albuterol inhaler for wheezing and shortness of breath.    Prednisone- 5 day course.  Do not take any additional motrin/advil/ibuprofen while on the steriods.  You may taken additional tylenol if needed for pain    Rest, increase fluids, good hand washing.  Please follow up with your family doctor if no improvement in 7-10 days.

## 2024-08-18 LAB
ATRIAL RATE: 91 BPM
P AXIS: 46 DEGREES
PR INTERVAL: 126 MS
QRS AXIS: 51 DEGREES
QRSD INTERVAL: 82 MS
QT INTERVAL: 394 MS
QTC INTERVAL: 484 MS
T WAVE AXIS: 48 DEGREES
VENTRICULAR RATE: 91 BPM

## 2024-08-18 PROCEDURE — 93010 ELECTROCARDIOGRAM REPORT: CPT | Performed by: INTERNAL MEDICINE

## 2024-08-19 DIAGNOSIS — E78.2 HYPERLIPEMIA, MIXED: ICD-10-CM

## 2024-08-19 DIAGNOSIS — I10 ESSENTIAL HYPERTENSION: ICD-10-CM

## 2024-08-19 RX ORDER — FENOFIBRATE 134 MG/1
134 CAPSULE ORAL
Qty: 90 CAPSULE | Refills: 1 | Status: SHIPPED | OUTPATIENT
Start: 2024-08-19

## 2024-08-19 RX ORDER — LOSARTAN POTASSIUM AND HYDROCHLOROTHIAZIDE 12.5; 5 MG/1; MG/1
1 TABLET ORAL DAILY
Qty: 90 TABLET | Refills: 1 | Status: SHIPPED | OUTPATIENT
Start: 2024-08-19

## 2024-08-30 ENCOUNTER — OFFICE VISIT (OUTPATIENT)
Dept: URGENT CARE | Age: 59
End: 2024-08-30
Payer: COMMERCIAL

## 2024-08-30 ENCOUNTER — APPOINTMENT (OUTPATIENT)
Dept: RADIOLOGY | Age: 59
End: 2024-08-30
Payer: COMMERCIAL

## 2024-08-30 VITALS
DIASTOLIC BLOOD PRESSURE: 68 MMHG | HEART RATE: 86 BPM | SYSTOLIC BLOOD PRESSURE: 128 MMHG | RESPIRATION RATE: 18 BRPM | OXYGEN SATURATION: 97 % | TEMPERATURE: 98.3 F

## 2024-08-30 DIAGNOSIS — R05.1 ACUTE COUGH: Primary | ICD-10-CM

## 2024-08-30 DIAGNOSIS — R05.1 ACUTE COUGH: ICD-10-CM

## 2024-08-30 PROCEDURE — 99214 OFFICE O/P EST MOD 30 MIN: CPT

## 2024-08-30 PROCEDURE — 71046 X-RAY EXAM CHEST 2 VIEWS: CPT

## 2024-08-30 RX ORDER — DOXYCYCLINE 100 MG/1
100 CAPSULE ORAL 2 TIMES DAILY
Qty: 14 CAPSULE | Refills: 0 | Status: SHIPPED | OUTPATIENT
Start: 2024-08-30 | End: 2024-09-06

## 2024-08-30 RX ORDER — ALBUTEROL SULFATE 90 UG/1
2 AEROSOL, METERED RESPIRATORY (INHALATION) EVERY 6 HOURS PRN
Qty: 8.5 G | Refills: 0 | Status: SHIPPED | OUTPATIENT
Start: 2024-08-30

## 2024-08-30 NOTE — PROGRESS NOTES
[facility]        NAME: Rosa Resendiz is a 59 y.o. female  : 1965    MRN: [id number]  DATE: 2024  TIME: 11:05 AM    Assessment and Plan   Acute cough [R05.1]  1. Acute cough  XR chest pa and lateral    doxycycline monohydrate (MONODOX) 100 mg capsule    albuterol (ProAir HFA) 90 mcg/act inhaler      Rosa presented for evaluation of cough today.  Reassurance provided that Rosa's lungs are clear on examination today.  Post COVID cough is most likely the cause of her symptoms.  Pt is not using her albuterol due to it making her cough.  She does admit that after the coughing, she does feel better.  Patient requesting chest x-ray today. Will get CXR due to new incident of fever.    CXR: no cardiopulmonary findings by me; pending final read.     Discussed use of albuterol inhaler for SOB and tightness.   Will start abx due to fever with cough.  Recommend the following options: room humidifier, vicks vapo rub, hot/steamy shower.  Offer fluids frequently to help with hydration, as staying hydrated helps loosen up thick mucous.   May drink warm water with honey. May use lemon.  Tylenol/Ibuprofen for pain/fever.  Encourage coughing into the elbow instead of the hand.   Washing hands frequently with warm water and soap may help stop spread of infection.    If symptoms do not improve or worsen, please follow with PCP for further evaluation.       Patient Instructions       Follow up with PCP in 3-5 days.  Proceed to  ER if symptoms worsen.    If tests have been performed at [facility], our office will contact you with results if changes need to be made to the care plan discussed with you at the visit.  [facility]    Chief Complaint     Chief Complaint   Patient presents with   • Shortness of Breath   • Cough   • Fever     Shortness of breath, chest tightness, cough and fever for 3 days. Patient states had covid since 22.         History of Present  Illness       Pt is a 59 year old female presenting with fatigue, fever, SOB, and cough for 1 week. Tmax 102 at home. She reports having fever 1 month ago.  She reports tightness in the chest at rest and worse with coughing.  She denies taking anything for her symptoms.   History of active TB approximately 40 years ago, she does not smoke, denies active asthma.     Shortness of Breath  Associated symptoms include coughing. Pertinent negatives include no stridor or wheezing.   Cough  Associated symptoms include a fever and shortness of breath. Pertinent negatives include no chills or wheezing.   Fever  Associated symptoms include coughing and a fever. Pertinent negatives include no chills.       Review of Systems   Review of Systems   Constitutional:  Positive for fever. Negative for chills.   Respiratory:  Positive for cough and shortness of breath. Negative for choking, wheezing and stridor.          Current Medications       Current Outpatient Medications:   •  albuterol (ProAir HFA) 90 mcg/act inhaler, Inhale 2 puffs every 6 (six) hours as needed for shortness of breath, Disp: 8.5 g, Rfl: 0  •  doxycycline monohydrate (MONODOX) 100 mg capsule, Take 1 capsule (100 mg total) by mouth 2 (two) times a day for 7 days, Disp: 14 capsule, Rfl: 0  •  acetaminophen (TYLENOL) 650 mg CR tablet, Take 650 mg by mouth every 8 (eight) hours as needed for mild pain, Disp: , Rfl:   •  albuterol (Ventolin HFA) 90 mcg/act inhaler, Inhale 2 puffs every 6 (six) hours as needed for wheezing or shortness of breath, Disp: 18 g, Rfl: 0  •  Alpha-Lipoic Acid 600 MG CAPS, Take by mouth in the morning, Disp: , Rfl:   •  Calcium Carbonate-Vitamin D (CALCIUM 600+D HIGH POTENCY PO), Take by mouth daily, Disp: , Rfl:   •  Cholecalciferol (VITAMIN D3) 5000 units CAPS, Take by mouth daily, Disp: , Rfl:   •  famotidine (PEPCID) 40 MG tablet, Take 1 tablet by mouth daily as needed, Disp: , Rfl:   •  fenofibrate micronized (LOFIBRA) 134 MG capsule,  Take 1 capsule (134 mg total) by mouth daily with breakfast, Disp: 90 capsule, Rfl: 1  •  Flaxseed, Linseed, (FLAX SEED OIL) 1000 MG CAPS, Take 2 capsules by mouth daily , Disp: , Rfl:   •  ibuprofen (MOTRIN) 200 mg tablet, Take by mouth every 6 (six) hours as needed for mild pain , Disp: , Rfl:   •  lidocaine (XYLOCAINE) 5 % ointment, Apply topically 2 (two) times a day as needed for mild pain (for bilateral knee pain) (Patient not taking: Reported on 7/10/2024), Disp: 50 g, Rfl: 1  •  losartan-hydrochlorothiazide (HYZAAR) 50-12.5 mg per tablet, Take 1 tablet by mouth daily, Disp: 90 tablet, Rfl: 1  •  meloxicam (Mobic) 7.5 mg tablet, Take 1 tablet (7.5 mg total) by mouth daily (Patient not taking: Reported on 8/17/2024), Disp: 90 tablet, Rfl: 0    Current Allergies     Allergies as of 08/30/2024 - Reviewed 08/30/2024   Allergen Reaction Noted   • Banana - food allergy GI Intolerance 09/14/2020   • Statins Arthralgia 03/31/2021   • Fish oil [omega-3 fatty acids] Myalgia 08/31/2022   • Zetia [ezetimibe] Myalgia 08/31/2022            The following portions of the patient's history were reviewed and updated as appropriate: allergies, current medications, past family history, past medical history, past social history, past surgical history and problem list.     Past Medical History:   Diagnosis Date   • Abscess of back     Resolved 1/11/2018    • Adverse reaction to statin medication     Resolved 1/11/2018    • Alcoholism (HCC)     AGE 26   • Anesthesia complication     extreme dizziness upon awakening   • Anxiety    • Asthma    • BRCA1 negative     thru 23&Me   • Chronic pain disorder      rt side pinched nerve   • Colon polyp    • Depression    • Edema    • GERD (gastroesophageal reflux disease)    • Glaucoma suspect 09/28/2023   • Hyperlipidemia    • Hypertension    • Irregular heart beat     palpitations   • Irritable bowel syndrome    • Liver disease     fatty   • Neck pain    • Pinched nerve     Last assessed  2017    • PONV (postoperative nausea and vomiting)    • Tuberculosis            Past Surgical History:   Procedure Laterality Date   • CARDIAC CATHETERIZATION      due to brothers advanced heart disease and abnormal stress test   • COLONOSCOPY N/A 2017    Procedure: COLONOSCOPY;  Surgeon: Garcia Spivey MD;  Location: [facility] GI LAB;  Service: Gastroenterology   • COLONOSCOPY     • ELBOW SURGERY Right     tendon repair, post op nausea and dizziness   • ESOPHAGOGASTRODUODENOSCOPY N/A 2017    Procedure: ESOPHAGOGASTRODUODENOSCOPY (EGD);  Surgeon: Garcia Spivey MD;  Location: [facility] GI LAB;  Service: Gastroenterology   • ESOPHAGOGASTRODUODENOSCOPY      Diagnostic    • SKIN BIOPSY      Last assessed 2017    • UPPER GASTROINTESTINAL ENDOSCOPY     • WISDOM TOOTH EXTRACTION         Family History   Problem Relation Age of Onset   • Alzheimer's disease Mother    • Thyroid disease Mother            • Edema Mother    • Osteoarthritis Mother         Knee   • Arthritis Mother            • Stroke Father            • Hypertension Father            • Hyperlipidemia Father    • Arthritis Father            • Coronary artery disease Father    • Hypothyroidism Father    • Other Father         status post coronary artery bypass graft    • Stroke Sister    • Heart failure Sister    • Heart disease Sister    • Drug abuse Brother    • Completed Suicide  Brother    • No Known Problems Maternal Grandmother    • No Known Problems Maternal Grandfather    • No Known Problems Paternal Grandmother    • No Known Problems Paternal Grandfather    • Breast cancer Paternal Aunt 50   • Colon cancer Maternal Uncle 75   • No Known Problems Maternal Aunt    • No Known Problems Maternal Aunt    • Heart disease Brother                  Medications have been verified.        Objective   /68   Pulse 86   Temp 98.3 °F (36.8 °C) (Oral)   Resp 18   LMP  (LMP Unknown)    SpO2 97%   No LMP recorded (lmp unknown). Patient is postmenopausal.       Physical Exam     Physical Exam  Vitals and nursing note reviewed.   Constitutional:       General: She is not in acute distress.     Appearance: She is well-developed and normal weight. She is not ill-appearing or toxic-appearing.   Eyes:      Extraocular Movements: Extraocular movements intact.   Cardiovascular:      Rate and Rhythm: Normal rate and regular rhythm.   Pulmonary:      Effort: Pulmonary effort is normal.      Breath sounds: Normal breath sounds. No decreased breath sounds, wheezing, rhonchi or rales.   Chest:      Chest wall: No tenderness. There is no dullness to percussion.   Skin:     General: Skin is warm.      Capillary Refill: Capillary refill takes less than 2 seconds.   Neurological:      Mental Status: She is alert.

## 2024-09-09 ENCOUNTER — OFFICE VISIT (OUTPATIENT)
Age: 59
End: 2024-09-09
Payer: COMMERCIAL

## 2024-09-09 VITALS
TEMPERATURE: 98.2 F | WEIGHT: 177 LBS | HEART RATE: 87 BPM | HEIGHT: 60 IN | BODY MASS INDEX: 34.75 KG/M2 | DIASTOLIC BLOOD PRESSURE: 70 MMHG | OXYGEN SATURATION: 97 % | SYSTOLIC BLOOD PRESSURE: 130 MMHG

## 2024-09-09 DIAGNOSIS — U07.1 COVID-19: ICD-10-CM

## 2024-09-09 DIAGNOSIS — I10 ESSENTIAL HYPERTENSION: ICD-10-CM

## 2024-09-09 DIAGNOSIS — E78.5 HYPERLIPIDEMIA, UNSPECIFIED HYPERLIPIDEMIA TYPE: ICD-10-CM

## 2024-09-09 DIAGNOSIS — J45.909 UNCOMPLICATED ASTHMA, UNSPECIFIED ASTHMA SEVERITY, UNSPECIFIED WHETHER PERSISTENT: ICD-10-CM

## 2024-09-09 DIAGNOSIS — K76.0 HEPATIC STEATOSIS: ICD-10-CM

## 2024-09-09 DIAGNOSIS — Z13.228 SCREENING FOR METABOLIC DISORDER: Primary | ICD-10-CM

## 2024-09-09 DIAGNOSIS — R73.03 PRE-DIABETES: ICD-10-CM

## 2024-09-09 DIAGNOSIS — K21.9 GASTROESOPHAGEAL REFLUX DISEASE, UNSPECIFIED WHETHER ESOPHAGITIS PRESENT: ICD-10-CM

## 2024-09-09 DIAGNOSIS — E66.09 CLASS 1 OBESITY DUE TO EXCESS CALORIES WITH SERIOUS COMORBIDITY AND BODY MASS INDEX (BMI) OF 34.0 TO 34.9 IN ADULT: ICD-10-CM

## 2024-09-09 DIAGNOSIS — R05.9 COUGH, UNSPECIFIED TYPE: ICD-10-CM

## 2024-09-09 DIAGNOSIS — R73.01 ELEVATED FASTING GLUCOSE: ICD-10-CM

## 2024-09-09 DIAGNOSIS — E78.2 HYPERLIPEMIA, MIXED: ICD-10-CM

## 2024-09-09 PROCEDURE — 99214 OFFICE O/P EST MOD 30 MIN: CPT | Performed by: NURSE PRACTITIONER

## 2024-09-09 RX ORDER — FENOFIBRATE 200 MG/1
200 CAPSULE ORAL
Qty: 90 CAPSULE | Refills: 1 | Status: SHIPPED | OUTPATIENT
Start: 2024-09-09

## 2024-09-09 NOTE — PROGRESS NOTES
Assessment/Plan:    Essential hypertension  Well controlled on losartan-HCTZ        Asthma  -well controlled without exacerbation     Hepatic steatosis  LFT within normal range   Discussed dietary changes and encouraged increased exercise     GERD (gastroesophageal reflux disease)  -controlled on pepcid     Pre-diabetes  Patient is to continue to work on diet and exercise.  Limit sugars and carbohydrate intake.    Avoid soda, juice, sweets, cookies, desserts, pasta, bread.   Eat more whole grains, exercised 30 min of cardio at least 3 times a week.  Also recommended daily foot exams to check for sores, and recommended yearly eye exams.       Hyperlipemia, mixed  -Reports intolerance to Zetia and statin  -Increase dose of fenofibrate  -Will try statin every second or third day once we get results of updated lipid panel in 3 months  -Dietary changes and increased exercise    Class 1 obesity due to excess calories with serious comorbidity and body mass index (BMI) of 34.0 to 34.9 in adult  -Encouraged well balanced diet and increased exercise     COVID-19  -continues with cough, and intermittent SOB  -Has tried different inhalers and different antibiotics  -History of TB  -Recommend CT of chest              Diagnoses and all orders for this visit:    Screening for metabolic disorder  -     Comprehensive metabolic panel; Future  -     CBC and differential  -     Lipid panel    Hyperlipidemia, unspecified hyperlipidemia type  -     Lipid panel    Elevated fasting glucose  -     Hemoglobin A1C    Hyperlipemia, mixed  -     fenofibrate micronized (LOFIBRA) 200 MG capsule; Take 1 capsule (200 mg total) by mouth daily with breakfast    COVID-19  -     CT chest wo contrast; Future    Cough, unspecified type  -     CT chest wo contrast; Future    Essential hypertension    Uncomplicated asthma, unspecified asthma severity, unspecified whether persistent    Hepatic steatosis    Gastroesophageal reflux disease, unspecified  whether esophagitis present    Pre-diabetes    Class 1 obesity due to excess calories with serious comorbidity and body mass index (BMI) of 34.0 to 34.9 in adult          Subjective:      Patient ID: Rosa Resendiz is a 59 y.o. female.    Patient presents today to follow up on chronic conditions and to review blood work.     She reports that she had COVID back in July 27, 2024 she continues with cough and intermittent shortness of breath was seen in urgent care on August 30 for similar symptoms was started on antibiotic    Left knee pain- follows orthopedics, has relief with mobic, liver enzymes have been stable     Asthma-well controlled, currently does not use inhaler, typically worse with exercise     GERD-well-controlled with famotidine    Mixed hyperlipidemia-reports myalgia with statins and Zetia, ASCVD risk score 5.3%, cholesterol 193, triglycerides 168,   Takes Krill oil   Was started on fenofibrate at last office visit, and advised to take statin every other day, with noted improvement    Essential hypertension-pressure well-controlled, denies any side effects with losartan-hydrochlorothiazide, denies headaches, dizziness, changes in vision    Pre-diabetes hemoglobin A1c 5.7, fasting glucose 84    DEXA scan completed February 2024, normal bone mineral density              The following portions of the patient's history were reviewed and updated as appropriate: allergies, current medications, past family history, past medical history, past social history, past surgical history, and problem list.    Review of Systems   Constitutional:  Negative for activity change, appetite change, chills, diaphoresis and fever.   HENT:  Negative for congestion, ear discharge, ear pain, postnasal drip, rhinorrhea, sinus pressure, sinus pain and sore throat.    Eyes:  Negative for pain, discharge, itching and visual disturbance.   Respiratory:  Positive for cough and shortness of breath. Negative for chest tightness and  wheezing.    Cardiovascular:  Negative for chest pain, palpitations and leg swelling.   Gastrointestinal:  Negative for abdominal pain, constipation, diarrhea, nausea and vomiting.   Endocrine: Negative for polydipsia, polyphagia and polyuria.   Genitourinary:  Negative for difficulty urinating, dysuria and urgency.   Musculoskeletal:  Negative for arthralgias, back pain and neck pain.   Skin:  Negative for rash and wound.   Neurological:  Negative for dizziness, weakness, numbness and headaches.         Past Medical History:   Diagnosis Date    Abscess of back     Resolved 1/11/2018     Adverse reaction to statin medication     Resolved 1/11/2018     Alcoholism (HCC)     AGE 26    Anesthesia complication     extreme dizziness upon awakening    Anxiety     Asthma     BRCA1 negative     thru 23&Me    Chronic pain disorder      rt side pinched nerve    Colon polyp     Depression     Edema     GERD (gastroesophageal reflux disease)     Glaucoma suspect 09/28/2023    Hyperlipidemia     Hypertension     Irregular heart beat     palpitations    Irritable bowel syndrome     Liver disease     fatty    Neck pain     Pinched nerve     Last assessed 9/26/2017     PONV (postoperative nausea and vomiting)     Tuberculosis     1986         Current Outpatient Medications:     acetaminophen (TYLENOL) 650 mg CR tablet, Take 650 mg by mouth every 8 (eight) hours as needed for mild pain, Disp: , Rfl:     albuterol (ProAir HFA) 90 mcg/act inhaler, Inhale 2 puffs every 6 (six) hours as needed for shortness of breath, Disp: 8.5 g, Rfl: 0    Alpha-Lipoic Acid 600 MG CAPS, Take by mouth in the morning, Disp: , Rfl:     Calcium Carbonate-Vitamin D (CALCIUM 600+D HIGH POTENCY PO), Take by mouth daily, Disp: , Rfl:     Cholecalciferol (VITAMIN D3) 5000 units CAPS, Take by mouth daily, Disp: , Rfl:     famotidine (PEPCID) 40 MG tablet, Take 1 tablet by mouth daily as needed, Disp: , Rfl:     fenofibrate micronized (LOFIBRA) 200 MG capsule,  Take 1 capsule (200 mg total) by mouth daily with breakfast, Disp: 90 capsule, Rfl: 1    Flaxseed, Linseed, (FLAX SEED OIL) 1000 MG CAPS, Take 2 capsules by mouth daily , Disp: , Rfl:     ibuprofen (MOTRIN) 200 mg tablet, Take by mouth every 6 (six) hours as needed for mild pain , Disp: , Rfl:     losartan-hydrochlorothiazide (HYZAAR) 50-12.5 mg per tablet, Take 1 tablet by mouth daily, Disp: 90 tablet, Rfl: 1    meloxicam (Mobic) 7.5 mg tablet, Take 1 tablet (7.5 mg total) by mouth daily (Patient taking differently: Take 7.5 mg by mouth daily as needed for mild pain), Disp: 90 tablet, Rfl: 0    lidocaine (XYLOCAINE) 5 % ointment, Apply topically 2 (two) times a day as needed for mild pain (for bilateral knee pain) (Patient not taking: Reported on 7/10/2024), Disp: 50 g, Rfl: 1    Allergies   Allergen Reactions    Banana - Food Allergy GI Intolerance    Statins Arthralgia    Fish Oil [Omega-3 Fatty Acids] Myalgia     Lovaza    Zetia [Ezetimibe] Myalgia       Social History   Past Surgical History:   Procedure Laterality Date    CARDIAC CATHETERIZATION      due to brothers advanced heart disease and abnormal stress test    COLONOSCOPY N/A 2017    Procedure: COLONOSCOPY;  Surgeon: Garcia Spivey MD;  Location: [facility];  Service: Gastroenterology    COLONOSCOPY      ELBOW SURGERY Right     tendon repair, post op nausea and dizziness    ESOPHAGOGASTRODUODENOSCOPY N/A 2017    Procedure: ESOPHAGOGASTRODUODENOSCOPY (EGD);  Surgeon: Garcia Spivey MD;  Location: [facility];  Service: Gastroenterology    ESOPHAGOGASTRODUODENOSCOPY      Diagnostic     SKIN BIOPSY      Last assessed 2017     UPPER GASTROINTESTINAL ENDOSCOPY      WISDOM TOOTH EXTRACTION       Family History   Problem Relation Age of Onset    Alzheimer's disease Mother     Thyroid disease Mother             Edema Mother     Osteoarthritis Mother         Knee    Arthritis Mother             Stroke Father          "    Hypertension Father             Hyperlipidemia Father     Arthritis Father             Coronary artery disease Father     Hypothyroidism Father     Other Father         status post coronary artery bypass graft     Stroke Sister     Heart failure Sister     Heart disease Sister     Drug abuse Brother     Completed Suicide  Brother     No Known Problems Maternal Grandmother     No Known Problems Maternal Grandfather     No Known Problems Paternal Grandmother     No Known Problems Paternal Grandfather     Breast cancer Paternal Aunt 50    Colon cancer Maternal Uncle 75    No Known Problems Maternal Aunt     No Known Problems Maternal Aunt     Heart disease Brother                Objective:  /70 (BP Location: Left arm, Patient Position: Sitting, Cuff Size: Standard)   Pulse 87   Temp 98.2 °F (36.8 °C) (Temporal)   Ht 5' 0.43\" (1.535 m)   Wt 80.3 kg (177 lb)   LMP  (LMP Unknown)   SpO2 97%   BMI 34.07 kg/m²              Physical Exam  Constitutional:       General: She is not in acute distress.     Appearance: She is well-developed. She is not diaphoretic.   HENT:      Head: Normocephalic and atraumatic.      Right Ear: External ear normal.      Left Ear: External ear normal.      Nose: Nose normal.      Mouth/Throat:      Mouth: Mucous membranes are moist.      Pharynx: No oropharyngeal exudate or posterior oropharyngeal erythema.   Eyes:      General:         Right eye: No discharge.         Left eye: No discharge.      Conjunctiva/sclera: Conjunctivae normal.      Pupils: Pupils are equal, round, and reactive to light.   Neck:      Thyroid: No thyromegaly.   Cardiovascular:      Rate and Rhythm: Normal rate and regular rhythm.      Heart sounds: Normal heart sounds. No murmur heard.     No friction rub. No gallop.   Pulmonary:      Effort: Pulmonary effort is normal. No respiratory distress.      Breath sounds: Normal breath sounds. No stridor. No wheezing or rales. "   Abdominal:      General: Bowel sounds are normal. There is no distension.      Palpations: Abdomen is soft.      Tenderness: There is no abdominal tenderness.   Musculoskeletal:      Cervical back: Normal range of motion and neck supple.   Lymphadenopathy:      Cervical: No cervical adenopathy.   Skin:     General: Skin is warm and dry.      Findings: No erythema or rash.   Neurological:      Mental Status: She is alert and oriented to person, place, and time.   Psychiatric:         Behavior: Behavior normal.         Thought Content: Thought content normal.         Judgment: Judgment normal.

## 2024-09-10 NOTE — ASSESSMENT & PLAN NOTE
-Reports intolerance to Zetia and statin  -Increase dose of fenofibrate  -Will try statin every second or third day once we get results of updated lipid panel in 3 months  -Dietary changes and increased exercise

## 2024-09-10 NOTE — ASSESSMENT & PLAN NOTE
-continues with cough, and intermittent SOB  -Has tried different inhalers and different antibiotics  -History of TB  -Recommend CT of chest

## 2024-09-17 ENCOUNTER — HOSPITAL ENCOUNTER (OUTPATIENT)
Dept: RADIOLOGY | Age: 59
Discharge: HOME | End: 2024-09-17
Payer: COMMERCIAL

## 2024-09-17 DIAGNOSIS — U07.1 COVID-19: ICD-10-CM

## 2024-09-17 DIAGNOSIS — R05.9 COUGH, UNSPECIFIED TYPE: ICD-10-CM

## 2024-09-17 PROCEDURE — 71250 CT THORAX DX C-: CPT

## 2024-11-04 ENCOUNTER — DILATED FUNDUS EXAM (OUTPATIENT)
Dept: URBAN - METROPOLITAN AREA CLINIC 6 | Facility: CLINIC | Age: 59
End: 2024-11-04

## 2024-11-04 DIAGNOSIS — H25.813: ICD-10-CM

## 2024-11-04 DIAGNOSIS — H40.023: ICD-10-CM

## 2024-11-04 PROCEDURE — 92020 GONIOSCOPY: CPT

## 2024-11-04 PROCEDURE — 92133 CPTRZD OPH DX IMG PST SGM ON: CPT

## 2024-11-04 PROCEDURE — 92014 COMPRE OPH EXAM EST PT 1/>: CPT

## 2024-11-04 ASSESSMENT — VISUAL ACUITY
OS_CC: 20/25
OU_CC: J1+4

## 2024-11-04 ASSESSMENT — TONOMETRY
OS_IOP_MMHG: 18
OD_IOP_MMHG: 15

## 2024-11-18 ENCOUNTER — OFFICE VISIT (OUTPATIENT)
Age: 59
End: 2024-11-18
Payer: COMMERCIAL

## 2024-11-18 VITALS
OXYGEN SATURATION: 97 % | HEIGHT: 61 IN | DIASTOLIC BLOOD PRESSURE: 84 MMHG | SYSTOLIC BLOOD PRESSURE: 130 MMHG | BODY MASS INDEX: 33.83 KG/M2 | WEIGHT: 179.2 LBS | HEART RATE: 85 BPM | TEMPERATURE: 97.5 F

## 2024-11-18 DIAGNOSIS — L02.91 CUTANEOUS ABSCESS, UNSPECIFIED SITE: Primary | ICD-10-CM

## 2024-11-18 PROCEDURE — 99213 OFFICE O/P EST LOW 20 MIN: CPT | Performed by: NURSE PRACTITIONER

## 2024-11-18 RX ORDER — SULFAMETHOXAZOLE AND TRIMETHOPRIM 800; 160 MG/1; MG/1
1 TABLET ORAL 2 TIMES DAILY
Qty: 14 TABLET | Refills: 0 | Status: SHIPPED | OUTPATIENT
Start: 2024-11-18 | End: 2024-11-25

## 2024-11-18 NOTE — ASSESSMENT & PLAN NOTE
-recommend warm compresses   -tylenol for discomfort   -Start Bactrim  -Return to office if no relief of symptoms or worsening of symptoms    Orders:    sulfamethoxazole-trimethoprim (BACTRIM DS) 800-160 mg per tablet; Take 1 tablet by mouth 2 (two) times a day for 7 days

## 2024-11-18 NOTE — PROGRESS NOTES
"Name: Rosa Resendiz      : 1965      MRN: [id number]  Encounter Provider: PAUL Levin  Encounter Date: 2024   [facility]  :  Assessment & Plan  Cutaneous abscess, unspecified site  -recommend warm compresses   -tylenol for discomfort   -Start Bactrim  -Return to office if no relief of symptoms or worsening of symptoms    Orders:    sulfamethoxazole-trimethoprim (BACTRIM DS) 800-160 mg per tablet; Take 1 tablet by mouth 2 (two) times a day for 7 days           History of Present Illness     Patient presents today with a \"lump\" to the left side of her  groin.   She has been using warm compresses.   She reports she noticed it a week ago.   Denies drainage.   Mild tenderness to touch.   Denies fevers and chills.         Review of Systems   Constitutional:  Negative for activity change, appetite change, chills, diaphoresis and fever.   HENT:  Negative for congestion, ear discharge, ear pain, postnasal drip, rhinorrhea, sinus pressure, sinus pain and sore throat.    Eyes:  Negative for pain, discharge, itching and visual disturbance.   Respiratory:  Negative for cough, chest tightness, shortness of breath and wheezing.    Cardiovascular:  Negative for chest pain, palpitations and leg swelling.   Gastrointestinal:  Negative for abdominal pain, constipation, diarrhea, nausea and vomiting.   Endocrine: Negative for polydipsia, polyphagia and polyuria.   Genitourinary:  Negative for difficulty urinating, dysuria and urgency.   Musculoskeletal:  Negative for arthralgias, back pain and neck pain.   Skin:  Positive for wound. Negative for rash.   Neurological:  Negative for dizziness, weakness, numbness and headaches.          Objective   /84 (BP Location: Left arm, Patient Position: Sitting, Cuff Size: Standard)   Pulse 85   Temp 97.5 °F (36.4 °C) (Temporal)   Ht 5' 0.71\" (1.542 m)   Wt 81.3 kg (179 lb 3.2 oz)   LMP  (LMP " Unknown)   SpO2 97%   BMI 34.18 kg/m²      Physical Exam  Exam conducted with a chaperone present.   Constitutional:       General: She is not in acute distress.     Appearance: She is well-developed. She is not diaphoretic.   HENT:      Head: Normocephalic and atraumatic.      Right Ear: External ear normal.      Left Ear: External ear normal.      Nose: Nose normal.      Mouth/Throat:      Mouth: Mucous membranes are moist.      Pharynx: No oropharyngeal exudate or posterior oropharyngeal erythema.   Eyes:      General:         Right eye: No discharge.         Left eye: No discharge.      Conjunctiva/sclera: Conjunctivae normal.      Pupils: Pupils are equal, round, and reactive to light.   Neck:      Thyroid: No thyromegaly.   Cardiovascular:      Rate and Rhythm: Normal rate and regular rhythm.      Heart sounds: Normal heart sounds. No murmur heard.     No friction rub. No gallop.   Pulmonary:      Effort: Pulmonary effort is normal. No respiratory distress.      Breath sounds: Normal breath sounds. No stridor. No wheezing or rales.   Abdominal:      General: Bowel sounds are normal. There is no distension.      Palpations: Abdomen is soft.      Tenderness: There is no abdominal tenderness.   Musculoskeletal:      Cervical back: Normal range of motion and neck supple.   Lymphadenopathy:      Cervical: No cervical adenopathy.   Skin:     General: Skin is warm and dry.      Findings: No erythema or rash.          Neurological:      Mental Status: She is alert and oriented to person, place, and time.   Psychiatric:         Behavior: Behavior normal.         Thought Content: Thought content normal.         Judgment: Judgment normal.

## 2024-11-29 DIAGNOSIS — I10 ESSENTIAL HYPERTENSION: ICD-10-CM

## 2024-12-02 RX ORDER — LOSARTAN POTASSIUM AND HYDROCHLOROTHIAZIDE 12.5; 5 MG/1; MG/1
1 TABLET ORAL DAILY
Qty: 90 TABLET | Refills: 1 | Status: SHIPPED | OUTPATIENT
Start: 2024-12-02

## 2025-01-13 DIAGNOSIS — E78.2 HYPERLIPEMIA, MIXED: ICD-10-CM

## 2025-01-14 RX ORDER — FENOFIBRATE 200 MG/1
200 CAPSULE ORAL
Qty: 90 CAPSULE | Refills: 1 | Status: SHIPPED | OUTPATIENT
Start: 2025-01-14

## 2025-03-09 DIAGNOSIS — I10 ESSENTIAL HYPERTENSION: ICD-10-CM

## 2025-03-09 RX ORDER — LOSARTAN POTASSIUM AND HYDROCHLOROTHIAZIDE 12.5; 5 MG/1; MG/1
1 TABLET ORAL DAILY
Qty: 90 TABLET | Refills: 1 | Status: SHIPPED | OUTPATIENT
Start: 2025-03-09

## 2025-04-12 DIAGNOSIS — E78.2 HYPERLIPEMIA, MIXED: ICD-10-CM

## 2025-04-14 RX ORDER — FENOFIBRATE 200 MG/1
200 CAPSULE ORAL
Qty: 90 CAPSULE | Refills: 0 | Status: SHIPPED | OUTPATIENT
Start: 2025-04-14

## 2025-05-25 ENCOUNTER — APPOINTMENT (OUTPATIENT)
Dept: LAB | Age: 60
End: 2025-05-25
Payer: COMMERCIAL

## 2025-05-25 ENCOUNTER — OFFICE VISIT (OUTPATIENT)
Dept: URGENT CARE | Age: 60
End: 2025-05-25
Payer: COMMERCIAL

## 2025-05-25 VITALS
RESPIRATION RATE: 18 BRPM | DIASTOLIC BLOOD PRESSURE: 76 MMHG | HEART RATE: 84 BPM | SYSTOLIC BLOOD PRESSURE: 124 MMHG | TEMPERATURE: 97.5 F | OXYGEN SATURATION: 97 %

## 2025-05-25 DIAGNOSIS — L03.114 CELLULITIS OF LEFT UPPER EXTREMITY: Primary | ICD-10-CM

## 2025-05-25 DIAGNOSIS — Z13.228 SCREENING FOR METABOLIC DISORDER: ICD-10-CM

## 2025-05-25 LAB
ALBUMIN SERPL BCG-MCNC: 4.6 G/DL (ref 3.5–5)
ALP SERPL-CCNC: 39 U/L (ref 34–104)
ALT SERPL W P-5'-P-CCNC: 24 U/L (ref 7–52)
ANION GAP SERPL CALCULATED.3IONS-SCNC: 12 MMOL/L (ref 4–13)
AST SERPL W P-5'-P-CCNC: 23 U/L (ref 13–39)
BASOPHILS # BLD AUTO: 0.04 THOUSANDS/ÂΜL (ref 0–0.1)
BASOPHILS NFR BLD AUTO: 1 % (ref 0–1)
BILIRUB SERPL-MCNC: 0.86 MG/DL (ref 0.2–1)
BUN SERPL-MCNC: 11 MG/DL (ref 5–25)
CALCIUM SERPL-MCNC: 9.9 MG/DL (ref 8.4–10.2)
CHLORIDE SERPL-SCNC: 104 MMOL/L (ref 96–108)
CHOLEST SERPL-MCNC: 213 MG/DL (ref ?–200)
CO2 SERPL-SCNC: 27 MMOL/L (ref 21–32)
CREAT SERPL-MCNC: 0.73 MG/DL (ref 0.6–1.3)
EOSINOPHIL # BLD AUTO: 0.16 THOUSAND/ÂΜL (ref 0–0.61)
EOSINOPHIL NFR BLD AUTO: 4 % (ref 0–6)
ERYTHROCYTE [DISTWIDTH] IN BLOOD BY AUTOMATED COUNT: 12.4 % (ref 11.6–15.1)
EST. AVERAGE GLUCOSE BLD GHB EST-MCNC: 111 MG/DL
GFR SERPL CREATININE-BSD FRML MDRD: 88 ML/MIN/1.73SQ M
GLUCOSE P FAST SERPL-MCNC: 94 MG/DL (ref 65–99)
HBA1C MFR BLD: 5.5 %
HCT VFR BLD AUTO: 43.2 % (ref 34.8–46.1)
HDLC SERPL-MCNC: 51 MG/DL
HGB BLD-MCNC: 14.4 G/DL (ref 11.5–15.4)
IMM GRANULOCYTES # BLD AUTO: 0.01 THOUSAND/UL (ref 0–0.2)
IMM GRANULOCYTES NFR BLD AUTO: 0 % (ref 0–2)
LDLC SERPL CALC-MCNC: 129 MG/DL (ref 0–100)
LYMPHOCYTES # BLD AUTO: 1.33 THOUSANDS/ÂΜL (ref 0.6–4.47)
LYMPHOCYTES NFR BLD AUTO: 29 % (ref 14–44)
MCH RBC QN AUTO: 29.8 PG (ref 26.8–34.3)
MCHC RBC AUTO-ENTMCNC: 33.3 G/DL (ref 31.4–37.4)
MCV RBC AUTO: 89 FL (ref 82–98)
MONOCYTES # BLD AUTO: 0.63 THOUSAND/ÂΜL (ref 0.17–1.22)
MONOCYTES NFR BLD AUTO: 14 % (ref 4–12)
NEUTROPHILS # BLD AUTO: 2.46 THOUSANDS/ÂΜL (ref 1.85–7.62)
NEUTS SEG NFR BLD AUTO: 52 % (ref 43–75)
NONHDLC SERPL-MCNC: 162 MG/DL
NRBC BLD AUTO-RTO: 0 /100 WBCS
PLATELET # BLD AUTO: 252 THOUSANDS/UL (ref 149–390)
PMV BLD AUTO: 10.5 FL (ref 8.9–12.7)
POTASSIUM SERPL-SCNC: 3.7 MMOL/L (ref 3.5–5.3)
PROT SERPL-MCNC: 7.4 G/DL (ref 6.4–8.4)
RBC # BLD AUTO: 4.83 MILLION/UL (ref 3.81–5.12)
SODIUM SERPL-SCNC: 143 MMOL/L (ref 135–147)
TRIGL SERPL-MCNC: 163 MG/DL (ref ?–150)
WBC # BLD AUTO: 4.63 THOUSAND/UL (ref 4.31–10.16)

## 2025-05-25 PROCEDURE — 36415 COLL VENOUS BLD VENIPUNCTURE: CPT

## 2025-05-25 PROCEDURE — 80053 COMPREHEN METABOLIC PANEL: CPT

## 2025-05-25 PROCEDURE — 99213 OFFICE O/P EST LOW 20 MIN: CPT

## 2025-05-25 RX ORDER — MUPIROCIN 20 MG/G
OINTMENT TOPICAL 3 TIMES DAILY
Qty: 22 G | Refills: 0 | Status: SHIPPED | OUTPATIENT
Start: 2025-05-25

## 2025-05-25 RX ORDER — CEPHALEXIN 500 MG/1
500 CAPSULE ORAL EVERY 6 HOURS SCHEDULED
Qty: 28 CAPSULE | Refills: 0 | Status: SHIPPED | OUTPATIENT
Start: 2025-05-25 | End: 2025-06-01

## 2025-05-25 NOTE — PROGRESS NOTES
[facility]  Name: Rosa Resendiz      : 1965      MRN: [id number]  Encounter Provider: PAUL Garcia  Encounter Date: 2025   Encounter department: [facility] [place]  :  Assessment & Plan  Cellulitis of left upper extremity    Orders:  •  mupirocin (BACTROBAN) 2 % ointment; Apply topically 3 (three) times a day  •  cephalexin (KEFLEX) 500 mg capsule; Take 1 capsule (500 mg total) by mouth every 6 (six) hours for 7 days      Monitor site for increased redness, change in rash, increased swelling, or drainage.     If this develops or you develop any fever, chills, aches, joint pain, swelling, headache, dizziness, numbness or any new or concerning symptoms please proceed to ER.    Take antibiotics as prescribed.   Take entire course of antibiotics.     Eat yogurt with live and active cultures and/or take a probiotic at least 3 hours before or after antibiotic dose.   Monitor stool for diarrhea and/or blood. If this occurs, contact primary care doctor ASAP.     Good hand hygiene  Avoid scratching area  Keep area clean and dry    Watch for signs of increased infection as previously discussed    If you develop any facial swelling, shortness of breath, difficulty breathing or any new or concerning symptoms please return or proceed ER.     Follow up with PCP in 3-5 days.    Patient Instructions  Follow up with PCP in 3-5 days.  Proceed to  ER if symptoms worsen.    If tests are performed, our office will contact you with results only if changes need to made to the care plan discussed with you at the visit. You can review your full results on [facility] MyChart.    Chief Complaint:   Chief Complaint   Patient presents with   • Rash     Patient has small papules on her left arm and is concerned after getting her tattoo colored in on .      History of Present Illness {?Quick Links Encounters * My Last Note * Last Note in Specialty * Snapshot * Since Last Visit * History :78270}  Pt  is a 60 year old female presenting with left upper arm redness, warmth, and scant drainage to tattooed area. She states she had touch up to the area 10 days ago; concerned she has not cleaned the area as directed by her artist. She reports 2 new pimples around the tattoo, swollen lymph nodes to left axilla with aching to left arm.  She denies numbness, tingling or weakness to left arm, no fever or chills.  She has not taken anything for the symptoms.     Rash  Pertinent negatives include no fatigue or fever.         Review of Systems   Constitutional:  Negative for chills, fatigue and fever.   Skin:  Positive for rash.     Past Medical History   Past Medical History[1]  Past Surgical History[2]  Family History[3]  she reports that she quit smoking about 39 years ago. Her smoking use included cigarettes. She started smoking about 49 years ago. She has a 13.5 pack-year smoking history. She has never used smokeless tobacco. She reports that she does not currently use alcohol. She reports that she does not currently use drugs.  Current Outpatient Medications   Medication Instructions   • acetaminophen (TYLENOL) 650 mg, Every 8 hours PRN   • albuterol (ProAir HFA) 90 mcg/act inhaler 2 puffs, Inhalation, Every 6 hours PRN   • Alpha-Lipoic Acid 600 MG CAPS Daily   • Calcium Carbonate-Vitamin D (CALCIUM 600+D HIGH POTENCY PO) Daily   • cephalexin (KEFLEX) 500 mg, Oral, Every 6 hours scheduled   • Cholecalciferol (VITAMIN D3) 5000 units CAPS Daily   • famotidine (PEPCID) 40 MG tablet 1 tablet, Daily PRN   • fenofibrate micronized (LOFIBRA) 200 mg, Oral, Daily with breakfast   • Flaxseed, Linseed, (FLAX SEED OIL) 1000 MG CAPS 2 capsules, Daily   • ibuprofen (MOTRIN) 200 mg tablet Every 6 hours PRN   • lidocaine (XYLOCAINE) 5 % ointment Topical, 2 times daily PRN   • losartan-hydrochlorothiazide (HYZAAR) 50-12.5 mg per tablet 1 tablet, Oral, Daily   • meloxicam (MOBIC) 7.5 mg, Oral, Daily   • mupirocin (BACTROBAN) 2 % ointment  "Topical, 3 times daily   Allergies[4]     Objective {?Quick Links Trend Vitals * Enter New Vitals * Results Review * Timeline (Adult) * Labs * Imaging * Cardiology * Procedures * Lung Cancer Screening * Surgical eConsent :23217}  /76   Pulse 84   Temp 97.5 °F (36.4 °C)   Resp 18   LMP  (LMP Unknown)   SpO2 97%      Physical Exam  Vitals and nursing note reviewed.   Constitutional:       General: She is not in acute distress.     Appearance: Normal appearance. She is normal weight. She is not ill-appearing.   Pulmonary:      Effort: Pulmonary effort is normal. No respiratory distress.      Breath sounds: Normal breath sounds.     Skin:     General: Skin is warm.      Capillary Refill: Capillary refill takes less than 2 seconds.      Findings: Abscess and rash present.           Comments: Left arm:Redness and warmth to proximal and middle third left upper arm.  2 lesions less than dime size, red without fluctuance.  Mild surrounding redness.  Tenderness to palpation over left upper arm.  Neurovascularly intact distally to the site.       Neurological:      Mental Status: She is alert.         Portions of the record may have been created with voice recognition software.  Occasional wrong word or \"sound a like\" substitutions may have occurred due to the inherent limitations of voice recognition software.  Read the chart carefully and recognize, using context, where substitutions have occurred.         [1]  Past Medical History:  Diagnosis Date   • Abscess of back     Resolved 1/11/2018    • Adverse reaction to statin medication     Resolved 1/11/2018    • Alcoholism (HCC)     AGE 26   • Anesthesia complication     extreme dizziness upon awakening   • Anxiety    • Asthma    • BRCA1 negative     thru 23&Me   • Chronic pain disorder      rt side pinched nerve   • Colon polyp    • Depression    • Edema    • GERD (gastroesophageal reflux disease)    • Glaucoma suspect 09/28/2023   • Hyperlipidemia    • " Hypertension    • Irregular heart beat     palpitations   • Irritable bowel syndrome    • Liver disease     fatty   • Neck pain    • Pinched nerve     Last assessed 2017    • PONV (postoperative nausea and vomiting)    • Tuberculosis        [2]  Past Surgical History:  Procedure Laterality Date   • CARDIAC CATHETERIZATION      due to brothers advanced heart disease and abnormal stress test   • COLONOSCOPY N/A 2017    Procedure: COLONOSCOPY;  Surgeon: Garcia Spivey MD;  Location: [facility];  Service: Gastroenterology   • COLONOSCOPY     • ELBOW SURGERY Right     tendon repair, post op nausea and dizziness   • ESOPHAGOGASTRODUODENOSCOPY N/A 2017    Procedure: ESOPHAGOGASTRODUODENOSCOPY (EGD);  Surgeon: Garcia Spivey MD;  Location: [facility];  Service: Gastroenterology   • ESOPHAGOGASTRODUODENOSCOPY      Diagnostic    • SKIN BIOPSY      Last assessed 2017    • UPPER GASTROINTESTINAL ENDOSCOPY     • WISDOM TOOTH EXTRACTION     [3]  Family History  Problem Relation Name Age of Onset   • Alzheimer's disease Mother Megan Resendiz    • Thyroid disease Mother Megan Resendiz            • Edema Mother Megan Resendiz    • Osteoarthritis Mother Megan Resendiz         Knee   • Arthritis Mother Megan Resendiz            • Stroke Father TEQUILA RESENDIZ            • Hypertension Father TEQUILA RESENDIZ            • Hyperlipidemia Father TEQUILA RESENDIZ    • Arthritis Father TEQUILA RESENDIZ            • Coronary artery disease Father TEQUILA RESENDIZ    • Hypothyroidism Father TEQUILA RESENDIZ    • Other Father TEQUILA RESENDIZ         status post coronary artery bypass graft    • Stroke Sister [id number]    • Heart failure Sister [id number]    • Heart disease Sister [id number]    • Drug abuse Brother     • Completed Suicide  Brother     • No Known Problems Maternal Grandmother     • No Known Problems Maternal Grandfather     • No Known Problems Paternal Grandmother      • No Known Problems Paternal Grandfather     • Breast cancer Paternal Aunt  50   • Colon cancer Maternal Uncle  75   • No Known Problems Maternal Aunt     • No Known Problems Maternal Aunt     • Heart disease Brother YARED OTT            [4]  Allergies  Allergen Reactions   • Banana - Food Allergy GI Intolerance   • Statins Arthralgia   • Fish Oil [Omega-3 Fatty Acids] Myalgia     Lovaza   • Zetia [Ezetimibe] Myalgia

## 2025-05-25 NOTE — PATIENT INSTRUCTIONS
Monitor site for increased redness, change in rash, increased swelling, or drainage.     If this develops or you develop any fever, chills, aches, joint pain, swelling, headache, dizziness, numbness or any new or concerning symptoms please proceed to ER.    Take antibiotics as prescribed.   Take entire course of antibiotics.     Eat yogurt with live and active cultures and/or take a probiotic at least 3 hours before or after antibiotic dose.   Monitor stool for diarrhea and/or blood. If this occurs, contact primary care doctor ASAP.     Good hand hygiene  Avoid scratching area  Keep area clean and dry    Watch for signs of increased infection as previously discussed    If you develop any facial swelling, shortness of breath, difficulty breathing or any new or concerning symptoms please return or proceed ER.     Follow up with PCP in 3-5 days.

## 2025-05-28 ENCOUNTER — OFFICE VISIT (OUTPATIENT)
Age: 60
End: 2025-05-28
Payer: COMMERCIAL

## 2025-05-28 VITALS
HEIGHT: 60 IN | OXYGEN SATURATION: 98 % | SYSTOLIC BLOOD PRESSURE: 118 MMHG | WEIGHT: 171 LBS | TEMPERATURE: 97 F | BODY MASS INDEX: 33.57 KG/M2 | HEART RATE: 71 BPM | DIASTOLIC BLOOD PRESSURE: 80 MMHG

## 2025-05-28 DIAGNOSIS — E66.09 CLASS 1 OBESITY DUE TO EXCESS CALORIES WITH SERIOUS COMORBIDITY AND BODY MASS INDEX (BMI) OF 34.0 TO 34.9 IN ADULT: ICD-10-CM

## 2025-05-28 DIAGNOSIS — E66.811 CLASS 1 OBESITY DUE TO EXCESS CALORIES WITH SERIOUS COMORBIDITY AND BODY MASS INDEX (BMI) OF 34.0 TO 34.9 IN ADULT: ICD-10-CM

## 2025-05-28 DIAGNOSIS — K21.9 GASTROESOPHAGEAL REFLUX DISEASE, UNSPECIFIED WHETHER ESOPHAGITIS PRESENT: ICD-10-CM

## 2025-05-28 DIAGNOSIS — T46.6X5A ADVERSE REACTION TO STATIN MEDICATION: ICD-10-CM

## 2025-05-28 DIAGNOSIS — Z00.00 ANNUAL PHYSICAL EXAM: Primary | ICD-10-CM

## 2025-05-28 DIAGNOSIS — G89.29 CHRONIC PAIN OF LEFT KNEE: ICD-10-CM

## 2025-05-28 DIAGNOSIS — R73.03 PRE-DIABETES: ICD-10-CM

## 2025-05-28 DIAGNOSIS — Z13.228 SCREENING FOR METABOLIC DISORDER: ICD-10-CM

## 2025-05-28 DIAGNOSIS — E78.2 HYPERLIPEMIA, MIXED: ICD-10-CM

## 2025-05-28 DIAGNOSIS — M25.562 CHRONIC PAIN OF LEFT KNEE: ICD-10-CM

## 2025-05-28 DIAGNOSIS — J45.909 UNCOMPLICATED ASTHMA, UNSPECIFIED ASTHMA SEVERITY, UNSPECIFIED WHETHER PERSISTENT: ICD-10-CM

## 2025-05-28 DIAGNOSIS — E78.5 HYPERLIPIDEMIA, UNSPECIFIED HYPERLIPIDEMIA TYPE: ICD-10-CM

## 2025-05-28 DIAGNOSIS — K76.0 HEPATIC STEATOSIS: ICD-10-CM

## 2025-05-28 DIAGNOSIS — I10 ESSENTIAL HYPERTENSION: ICD-10-CM

## 2025-05-28 PROBLEM — L02.91 ABSCESS OF SKIN: Status: RESOLVED | Noted: 2024-11-18 | Resolved: 2025-05-28

## 2025-05-28 PROBLEM — K63.5 COLON POLYPS: Status: RESOLVED | Noted: 2017-12-11 | Resolved: 2025-05-28

## 2025-05-28 PROBLEM — M54.12 CERVICAL RADICULOPATHY: Status: RESOLVED | Noted: 2020-08-21 | Resolved: 2025-05-28

## 2025-05-28 PROBLEM — J40 BRONCHITIS: Status: RESOLVED | Noted: 2024-04-01 | Resolved: 2025-05-28

## 2025-05-28 PROBLEM — M54.16 LUMBAR RADICULOPATHY: Status: RESOLVED | Noted: 2022-02-18 | Resolved: 2025-05-28

## 2025-05-28 PROBLEM — M50.122 CERVICAL DISC DISORDER AT C5-C6 LEVEL WITH RADICULOPATHY: Status: RESOLVED | Noted: 2021-05-17 | Resolved: 2025-05-28

## 2025-05-28 PROCEDURE — 99396 PREV VISIT EST AGE 40-64: CPT | Performed by: NURSE PRACTITIONER

## 2025-05-28 PROCEDURE — 99214 OFFICE O/P EST MOD 30 MIN: CPT | Performed by: NURSE PRACTITIONER

## 2025-05-28 RX ORDER — LOSARTAN POTASSIUM AND HYDROCHLOROTHIAZIDE 12.5; 5 MG/1; MG/1
1 TABLET ORAL DAILY
Qty: 90 TABLET | Refills: 1 | Status: SHIPPED | OUTPATIENT
Start: 2025-05-28

## 2025-05-28 NOTE — ASSESSMENT & PLAN NOTE
-up to date with fasting blood work   -Continue with well-balanced diet, encouraged daily exercise  -She is up-to-date with mammogram/colonoscopy and  cervical cancer screening  -She is up-to-date with vaccinations  -Encourage monthly self breast examination  -Follow-up in 1 year for annual physical or sooner if needed

## 2025-05-28 NOTE — ASSESSMENT & PLAN NOTE
Well controlled on losartan-HCTZ        Orders:    losartan-hydrochlorothiazide (HYZAAR) 50-12.5 mg per tablet; Take 1 tablet by mouth daily

## 2025-05-28 NOTE — PATIENT INSTRUCTIONS
"Patient Education     Routine physical for adults   The Basics   Written by the doctors and editors at [facility]   What is a physical? -- A physical is a routine visit, or \"check-up,\" with your doctor. You might also hear it called a \"wellness visit\" or \"preventive visit.\"  During each visit, the doctor will:   Ask about your physical and mental health   Ask about your habits, behaviors, and lifestyle   Do an exam   Give you vaccines if needed   Talk to you about any medicines you take   Give advice about your health   Answer your questions  Getting regular check-ups is an important part of taking care of your health. It can help your doctor find and treat any problems you have. But it's also important for preventing health problems.  A routine physical is different from a \"sick visit.\" A sick visit is when you see a doctor because of a health concern or problem. Since physicals are scheduled ahead of time, you can think about what you want to ask the doctor.  How often should I get a physical? -- It depends on your age and health. In general, for people age 21 years and older:   If you are younger than 50 years, you might be able to get a physical every 3 years.   If you are 50 years or older, your doctor might recommend a physical every year.  If you have an ongoing health condition, like diabetes or high blood pressure, your doctor will probably want to see you more often.  What happens during a physical? -- In general, each visit will include:   Physical exam - The doctor or nurse will check your height, weight, heart rate, and blood pressure. They will also look at your eyes and ears. They will ask about how you are feeling and whether you have any symptoms that bother you.   Medicines - It's a good idea to bring a list of all the medicines you take to each doctor visit. Your doctor will talk to you about your medicines and answer any questions. Tell them if you are having any side effects that bother you. You " "should also tell them if you are having trouble paying for any of your medicines.   Habits and behaviors - This includes:   Your diet   Your exercise habits   Whether you smoke, drink alcohol, or use drugs   Whether you are sexually active   Whether you feel safe at home  Your doctor will talk to you about things you can do to improve your health and lower your risk of health problems. They will also offer help and support. For example, if you want to quit smoking, they can give you advice and might prescribe medicines. If you want to improve your diet or get more physical activity, they can help you with this, too.   Lab tests, if needed - The tests you get will depend on your age and situation. For example, your doctor might want to check your:   Cholesterol   Blood sugar   Iron level   Vaccines - The recommended vaccines will depend on your age, health, and what vaccines you already had. Vaccines are very important because they can prevent certain serious or deadly infections.   Discussion of screening - \"Screening\" means checking for diseases or other health problems before they cause symptoms. Your doctor can recommend screening based on your age, risk, and preferences. This might include tests to check for:   Cancer, such as breast, prostate, cervical, ovarian, colorectal, prostate, lung, or skin cancer   Sexually transmitted infections, such as chlamydia and gonorrhea   Mental health conditions like depression and anxiety  Your doctor will talk to you about the different types of screening tests. They can help you decide which screenings to have. They can also explain what the results might mean.   Answering questions - The physical is a good time to ask the doctor or nurse questions about your health. If needed, they can refer you to other doctors or specialists, too.  Adults older than 65 years often need other care, too. As you get older, your doctor will talk to you about:   How to prevent falling at " home   Hearing or vision tests   Memory testing   How to take your medicines safely   Making sure that you have the help and support you need at home  All topics are updated as new evidence becomes available and our peer review process is complete.  This topic retrieved from [employer] on: May 02, 2024.  Topic 709295 Version 1.0  Release: 32.4.3 - C32.122  © 2024 UpToDate, Inc. and/or its affiliates. All rights reserved.  Consumer Information Use and Disclaimer   Disclaimer: This generalized information is a limited summary of diagnosis, treatment, and/or medication information. It is not meant to be comprehensive and should be used as a tool to help the user understand and/or assess potential diagnostic and treatment options. It does NOT include all information about conditions, treatments, medications, side effects, or risks that may apply to a specific patient. It is not intended to be medical advice or a substitute for the medical advice, diagnosis, or treatment of a health care provider based on the health care provider's examination and assessment of a patient's specific and unique circumstances. Patients must speak with a health care provider for complete information about their health, medical questions, and treatment options, including any risks or benefits regarding use of medications. This information does not endorse any treatments or medications as safe, effective, or approved for treating a specific patient. UpToDate, Inc. and its affiliates disclaim any warranty or liability relating to this information or the use thereof.The use of this information is governed by the Terms of Use, available at https://www.wolterstenfarmsuwer.com/en/know/clinical-effectiveness-terms. 2024© UpToDate, Inc. and its affiliates and/or licensors. All rights reserved.  Copyright   © 2024 UpToDate, Inc. and/or its affiliates. All rights reserved.

## 2025-05-28 NOTE — ASSESSMENT & PLAN NOTE
-Reports intolerance to Zetia and statin  -continue fenofibrate  -Will try statin every second or third day once we get results of updated lipid panel in 3 months  -Dietary changes and increased exercise

## 2025-05-28 NOTE — PROGRESS NOTES
Adult Annual Physical  Name: Rosa Resendiz      : 1965      MRN: [id number]  Encounter Provider: PAUL Levin  Encounter Date: 2025   Encounter department: [facility]    :  Assessment & Plan  Essential hypertension  Well controlled on losartan-HCTZ        Orders:    losartan-hydrochlorothiazide (HYZAAR) 50-12.5 mg per tablet; Take 1 tablet by mouth daily    Annual physical exam  -up to date with fasting blood work   -Continue with well-balanced diet, encouraged daily exercise  -She is up-to-date with mammogram/colonoscopy and  cervical cancer screening  -She is up-to-date with vaccinations  -Encourage monthly self breast examination  -Follow-up in 1 year for annual physical or sooner if needed           Screening for metabolic disorder    Orders:    Comprehensive metabolic panel    CBC and differential    Lipid panel    Hyperlipidemia, unspecified hyperlipidemia type    Orders:    Lipid panel    Uncomplicated asthma, unspecified asthma severity, unspecified whether persistent  -well controlled without exacerbation          Hepatic steatosis  LFT within normal range   Discussed dietary changes and encouraged increased exercise          Gastroesophageal reflux disease, unspecified whether esophagitis present  -controlled on pepcid          Chronic pain of left knee  -continue to follow with orthopedic   -mobic only as needed, trend LFTs         Pre-diabetes  Patient is to continue to work on diet and exercise.  Limit sugars and carbohydrate intake.    Avoid soda, juice, sweets, cookies, desserts, pasta, bread.   Eat more whole grains, exercised 30 min of cardio at least 3 times a week.  Also recommended daily foot exams to check for sores, and recommended yearly eye exams.            Hyperlipemia, mixed  -Reports intolerance to Zetia and statin  -continue fenofibrate  -Will try statin every second or third day once we get results of updated lipid  panel in 3 months  -Dietary changes and increased exercise         Class 1 obesity due to excess calories with serious comorbidity and body mass index (BMI) of 34.0 to 34.9 in adult  -Encouraged well balanced diet and increased exercise          Adverse reaction to statin medication             Preventive Screenings:  - Diabetes Screening: screening up-to-date  - Cholesterol Screening: screening not indicated and has hyperlipidemia   - Hepatitis C screening: screening up-to-date   - HIV screening: risks/benefits discussed   - Cervical cancer screening: screening up-to-date   - Breast cancer screening: screening up-to-date   - Colon cancer screening: screening up-to-date   - Lung cancer screening: screening not indicated     Immunizations:    - Risks/benefits immunizations discussed      Counseling/Anticipatory Guidance:  - Alcohol: discussed moderation in alcohol intake and recommendations for healthy alcohol use.   - Drug use: discussed harms of illicit drug use and how it can negatively impact mental/physical health.   - Tobacco use: discussed harms of tobacco use and management options for quitting.   - Dental health: discussed importance of regular tooth brushing, flossing, and dental visits.   - Sexual health: discussed sexually transmitted diseases, partner selection, use of condoms, avoidance of unintended pregnancy, and contraceptive alternatives.   - Diet: discussed recommendations for a healthy/well-balanced diet.   - Exercise: the importance of regular exercise/physical activity was discussed. Recommend exercise 3-5 times per week for at least 30 minutes.   - Injury prevention: discussed safety/seat belts, safety helmets, smoke detectors, carbon monoxide detectors, and smoking near bedding or upholstery.       Depression Screening and Follow-up Plan: Patient was screened for depression during today's encounter. They screened negative with a PHQ-2 score of 0.          History of Present Illness     Adult  Annual Physical:  Patient presents for annual physical. Patient presents today to follow up on chronic conditions and to review blood work.   Denies any questions or concerns     Left knee pain- reports pain has been improved with losing weights. Reports uses mobic rarely now. Reports she does walk a lot and helps     Asthma-well controlled, currently does not use inhaler, typically worse with exercise      GERD-well-controlled with famotidine     Mixed hyperlipidemia-reports myalgia with statins and Zetia, ASCVD risk score 5.1%, cholesterol 213, triglycerides 163, , HDL 51  Takes Krill oil. Reports doing well on the fenofibrate. Denies ant side effects.      Essential hypertension-pressure well-controlled, denies any side effects with losartan-hydrochlorothiazide, denies headaches, dizziness, changes in vision     Pre-diabetes hemoglobin A1c 5.5, fasting glucose 94    Scheduled for next mammogram 9/10/2025     DEXA scan completed February 2024, normal bone mineral density     .     Diet and Physical Activity:  - Diet/Nutrition: intermittent fasting and limited junk food. reports frequent take out at times  - Exercise: walking, strength training exercises, 3-4 times a week on average, 30-60 minutes on average and 1-2 hours on average.    Depression Screening:  - PHQ-2 Score: 0    General Health:  - Sleep: sleeps poorly, 4-6 hours of sleep on average and unrefreshing sleep. hard time staying asleep  - Hearing: normal hearing bilateral ears.  - Vision: vision problems, most recent eye exam < 1 year ago and wears glasses. possible glaucoma. sometimes blurry. Lots of screen time due to work  - Dental: regular dental visits, brushes teeth twice daily and floss regularly.    /GYN Health:  - Follows with GYN: yes.   - History of STDs: no    Advanced Care Planning:  - Has an advanced directive?: no    - Has a durable medical POA?: no      Review of Systems   Constitutional:  Negative for activity change, appetite  change, chills, diaphoresis and fever.   HENT:  Negative for congestion, ear discharge, ear pain, postnasal drip, rhinorrhea, sinus pressure, sinus pain and sore throat.    Eyes:  Negative for pain, discharge, itching and visual disturbance.   Respiratory:  Negative for cough, chest tightness, shortness of breath and wheezing.    Cardiovascular:  Negative for chest pain, palpitations and leg swelling.   Gastrointestinal:  Negative for abdominal pain, blood in stool, constipation, diarrhea, nausea and vomiting.   Endocrine: Negative for polydipsia, polyphagia and polyuria.   Genitourinary:  Negative for difficulty urinating, dysuria, hematuria and urgency.   Musculoskeletal:  Negative for arthralgias, back pain and neck pain.   Skin:  Negative for rash and wound.   Neurological:  Negative for dizziness, weakness, numbness and headaches.         Objective   /80 (BP Location: Left arm, Patient Position: Sitting, Cuff Size: Adult)   Pulse 71   Temp (!) 97 °F (36.1 °C)   Ht 5' (1.524 m)   Wt 77.6 kg (171 lb)   LMP  (LMP Unknown)   SpO2 98%   BMI 33.40 kg/m²     Physical Exam  Constitutional:       General: She is not in acute distress.     Appearance: She is well-developed. She is not diaphoretic.   HENT:      Head: Normocephalic and atraumatic.      Right Ear: External ear normal.      Left Ear: External ear normal.      Nose: Nose normal.      Mouth/Throat:      Mouth: Mucous membranes are moist.      Pharynx: No oropharyngeal exudate or posterior oropharyngeal erythema.     Eyes:      General:         Right eye: No discharge.         Left eye: No discharge.      Conjunctiva/sclera: Conjunctivae normal.      Pupils: Pupils are equal, round, and reactive to light.     Neck:      Thyroid: No thyromegaly.     Cardiovascular:      Rate and Rhythm: Normal rate and regular rhythm.      Heart sounds: Normal heart sounds. No murmur heard.     No friction rub. No gallop.   Pulmonary:      Effort: Pulmonary effort  is normal. No respiratory distress.      Breath sounds: Normal breath sounds. No stridor. No wheezing or rales.   Abdominal:      General: Bowel sounds are normal. There is no distension.      Palpations: Abdomen is soft.      Tenderness: There is no abdominal tenderness.     Musculoskeletal:      Cervical back: Normal range of motion and neck supple.   Lymphadenopathy:      Cervical: No cervical adenopathy.     Skin:     General: Skin is warm and dry.      Findings: No erythema or rash.     Neurological:      Mental Status: She is alert and oriented to person, place, and time.     Psychiatric:         Behavior: Behavior normal.         Thought Content: Thought content normal.         Judgment: Judgment normal.

## 2025-06-27 DIAGNOSIS — G89.29 CHRONIC PAIN OF LEFT KNEE: ICD-10-CM

## 2025-06-27 DIAGNOSIS — M25.562 CHRONIC PAIN OF LEFT KNEE: ICD-10-CM

## 2025-06-27 DIAGNOSIS — E78.2 HYPERLIPEMIA, MIXED: ICD-10-CM

## 2025-06-27 DIAGNOSIS — I10 ESSENTIAL HYPERTENSION: ICD-10-CM

## 2025-06-30 RX ORDER — MELOXICAM 7.5 MG/1
7.5 TABLET ORAL DAILY
Qty: 90 TABLET | Refills: 1 | Status: SHIPPED | OUTPATIENT
Start: 2025-06-30

## 2025-06-30 RX ORDER — LOSARTAN POTASSIUM AND HYDROCHLOROTHIAZIDE 12.5; 5 MG/1; MG/1
1 TABLET ORAL DAILY
Qty: 90 TABLET | Refills: 1 | Status: SHIPPED | OUTPATIENT
Start: 2025-06-30

## 2025-06-30 RX ORDER — FENOFIBRATE 200 MG/1
200 CAPSULE ORAL
Qty: 90 CAPSULE | Refills: 1 | Status: SHIPPED | OUTPATIENT
Start: 2025-06-30

## (undated) DEVICE — GAUZE SPONGES,16 PLY: Brand: CURITY

## (undated) DEVICE — BAG SPECIMEN BIOHAZARD 10 X 10 ADHESIVE

## (undated) DEVICE — TRAP POLY

## (undated) DEVICE — SNARE BARBED 230CM

## (undated) DEVICE — Device: Brand: OLYMPUS

## (undated) DEVICE — "MH-438 A/W VLVE F/140 EVIS-140": Brand: AIR/WATER VALVE

## (undated) DEVICE — MARKER SPOT EX  BOWEL TATTOO SYRINGE

## (undated) DEVICE — TRAVELKIT CONTAINS FIRST STEP KIT (200ML EP-4 KIT) AND SOILED SCOPE BAG - 1 KIT: Brand: TRAVELKIT CONTAINS FIRST STEP KIT AND SOILED SCOPE BAG

## (undated) DEVICE — FORCEP ELECSURG RADIAL JAW4 2.2 X 240CM  HOT BX

## (undated) DEVICE — "MAJ-901 WATER CONTAINER SET CV-160/140": Brand: WATER CONTAINER

## (undated) DEVICE — 1200CC GUARDIAN II: Brand: GUARDIAN

## (undated) DEVICE — GROUNDING PAD UNIVERSAL SLW

## (undated) DEVICE — "MB-142 MOUTHPIECE": Brand: MOUTHPIECE

## (undated) DEVICE — LUBRICANT SURGILUBE TUBE 4 OZ  FLIP TOP

## (undated) DEVICE — 60 ML SYRINGE,REGULAR TIP: Brand: MONOJECT

## (undated) DEVICE — BRUSH CYTOLOGY 3 MM 240 CM

## (undated) DEVICE — SINGLE-USE BIOPSY FORCEPS: Brand: RADIAL JAW 4

## (undated) DEVICE — DISPOSABLE BIOPSY VALVE MAJ-1555: Brand: SINGLE USE BIOPSY VALVE (STERILE)

## (undated) DEVICE — SOLIDIFIER FLUID WASTE CONTROL 1500ML

## (undated) DEVICE — AIRLIFE™  ADULT CUSHION NASAL CANNULA WITH 7 FOOT (2.1 M) CRUSH-RESISTANT OXYGEN TUBING, AND U/CONNECT-IT ADAPTER: Brand: AIRLIFE™

## (undated) DEVICE — GLOVE EXAM NON-STRL NTRL PLUS LRG PF

## (undated) DEVICE — "MH-443 SUCTION VALVE F/EVIS140 EVIS160": Brand: SUCTION VALVE

## (undated) DEVICE — MEDI-VAC YANKAUER SUCTION HANDLE: Brand: CARDINAL HEALTH

## (undated) DEVICE — TUBING AUX CHANNEL

## (undated) DEVICE — TUBING BUBBLE CLEAR 5MM X 100 FT NS

## (undated) DEVICE — SINGLE-USE POLYPECTOMY SNARE: Brand: SENSATION SHORT THROW

## (undated) DEVICE — BRUSH ENDO CLEANING DBL-HEADER

## (undated) DEVICE — BITE BLOCK ENDO 60FR ADLT MAXI  DISP W/STRAP